# Patient Record
Sex: MALE | Race: WHITE | NOT HISPANIC OR LATINO | Employment: OTHER | URBAN - METROPOLITAN AREA
[De-identification: names, ages, dates, MRNs, and addresses within clinical notes are randomized per-mention and may not be internally consistent; named-entity substitution may affect disease eponyms.]

---

## 2017-10-18 ENCOUNTER — HISTORICAL (OUTPATIENT)
Dept: ADMINISTRATIVE | Facility: HOSPITAL | Age: 82
End: 2017-10-18

## 2017-11-16 ENCOUNTER — HISTORICAL (OUTPATIENT)
Dept: ADMINISTRATIVE | Facility: HOSPITAL | Age: 82
End: 2017-11-16

## 2017-11-16 LAB
ABS NEUT (OLG): 5.95 X10(3)/MCL (ref 2.1–9.2)
ALBUMIN SERPL-MCNC: 3.8 GM/DL (ref 3.4–5)
ALBUMIN/GLOB SERPL: 1 RATIO (ref 1.1–2)
ALP SERPL-CCNC: 92 UNIT/L (ref 50–136)
ALT SERPL-CCNC: 24 UNIT/L (ref 12–78)
AST SERPL-CCNC: 19 UNIT/L (ref 15–37)
BASOPHILS # BLD AUTO: 0.1 X10(3)/MCL (ref 0–0.2)
BASOPHILS NFR BLD AUTO: 0.7 %
BILIRUB SERPL-MCNC: 0.3 MG/DL (ref 0.2–1)
BILIRUBIN DIRECT+TOT PNL SERPL-MCNC: 0.1 MG/DL (ref 0–0.5)
BILIRUBIN DIRECT+TOT PNL SERPL-MCNC: 0.2 MG/DL (ref 0–0.8)
BUN SERPL-MCNC: 18 MG/DL (ref 7–18)
CALCIUM SERPL-MCNC: 9 MG/DL (ref 8.5–10.1)
CEA SERPL-MCNC: 2 NG/ML (ref 0–3)
CHLORIDE SERPL-SCNC: 104 MMOL/L (ref 98–107)
CO2 SERPL-SCNC: 22 MMOL/L (ref 21–32)
CREAT SERPL-MCNC: 0.94 MG/DL (ref 0.7–1.3)
EOSINOPHIL # BLD AUTO: 0.1 X10(3)/MCL (ref 0–0.9)
EOSINOPHIL NFR BLD AUTO: 1.5 %
ERYTHROCYTE [DISTWIDTH] IN BLOOD BY AUTOMATED COUNT: 15.2 % (ref 11.5–17)
FERRITIN SERPL-MCNC: 10.9 NG/ML (ref 8–388)
GLOBULIN SER-MCNC: 3.9 GM/DL (ref 2.4–3.5)
GLUCOSE SERPL-MCNC: 83 MG/DL (ref 74–106)
HCT VFR BLD AUTO: 35.3 % (ref 42–52)
HGB BLD-MCNC: 10.8 GM/DL (ref 14–18)
IRON SATN MFR SERPL: 4.5 % (ref 20–50)
IRON SERPL-MCNC: 24 MCG/DL (ref 50–175)
LYMPHOCYTES # BLD AUTO: 1.1 X10(3)/MCL (ref 0.6–4.6)
LYMPHOCYTES NFR BLD AUTO: 13.2 %
MCH RBC QN AUTO: 26.1 PG (ref 27–31)
MCHC RBC AUTO-ENTMCNC: 30.6 GM/DL (ref 33–36)
MCV RBC AUTO: 85.3 FL (ref 80–94)
MONOCYTES # BLD AUTO: 0.9 X10(3)/MCL (ref 0.1–1.3)
MONOCYTES NFR BLD AUTO: 10.9 %
NEUTROPHILS # BLD AUTO: 6 X10(3)/MCL (ref 2.1–9.2)
NEUTROPHILS NFR BLD AUTO: 73.7 %
PLATELET # BLD AUTO: 203 X10(3)/MCL (ref 130–400)
PMV BLD AUTO: 9.6 FL (ref 9.4–12.4)
POTASSIUM SERPL-SCNC: 4.1 MMOL/L (ref 3.5–5.1)
PROT SERPL-MCNC: 7.7 GM/DL (ref 6.4–8.2)
RBC # BLD AUTO: 4.14 X10(6)/MCL (ref 4.7–6.1)
SODIUM SERPL-SCNC: 140 MMOL/L (ref 136–145)
TIBC SERPL-MCNC: 528 MCG/DL (ref 250–450)
TRANSFERRIN SERPL-MCNC: 404 MG/DL (ref 200–360)
WBC # SPEC AUTO: 8.1 X10(3)/MCL (ref 4.5–11.5)

## 2017-11-20 ENCOUNTER — HISTORICAL (OUTPATIENT)
Dept: INFUSION THERAPY | Facility: HOSPITAL | Age: 82
End: 2017-11-20

## 2017-11-27 ENCOUNTER — HISTORICAL (OUTPATIENT)
Dept: INFUSION THERAPY | Facility: HOSPITAL | Age: 82
End: 2017-11-27

## 2017-12-14 ENCOUNTER — HISTORICAL (OUTPATIENT)
Dept: ADMINISTRATIVE | Facility: HOSPITAL | Age: 82
End: 2017-12-14

## 2017-12-14 LAB
ABS NEUT (OLG): 5.06 X10(3)/MCL (ref 2.1–9.2)
ALBUMIN SERPL-MCNC: 3.9 GM/DL (ref 3.4–5)
ALBUMIN/GLOB SERPL: 1.1 {RATIO}
ALP SERPL-CCNC: 120 UNIT/L (ref 50–136)
ALT SERPL-CCNC: 24 UNIT/L (ref 12–78)
AST SERPL-CCNC: 15 UNIT/L (ref 15–37)
BASOPHILS # BLD AUTO: 0 X10(3)/MCL (ref 0–0.2)
BASOPHILS NFR BLD AUTO: 0.4 %
BILIRUB SERPL-MCNC: 0.3 MG/DL (ref 0.2–1)
BILIRUBIN DIRECT+TOT PNL SERPL-MCNC: 0.1 MG/DL (ref 0–0.2)
BILIRUBIN DIRECT+TOT PNL SERPL-MCNC: 0.2 MG/DL (ref 0–0.8)
BUN SERPL-MCNC: 13 MG/DL (ref 7–18)
CALCIUM SERPL-MCNC: 8.2 MG/DL (ref 8.5–10.1)
CHLORIDE SERPL-SCNC: 109 MMOL/L (ref 98–107)
CO2 SERPL-SCNC: 23 MMOL/L (ref 21–32)
CREAT SERPL-MCNC: 0.79 MG/DL (ref 0.7–1.3)
EOSINOPHIL # BLD AUTO: 0.2 X10(3)/MCL (ref 0–0.9)
EOSINOPHIL NFR BLD AUTO: 2.7 %
ERYTHROCYTE [DISTWIDTH] IN BLOOD BY AUTOMATED COUNT: 21.6 % (ref 11.5–17)
GLOBULIN SER-MCNC: 3.6 GM/DL (ref 2.4–3.5)
GLUCOSE SERPL-MCNC: 88 MG/DL (ref 74–106)
HCT VFR BLD AUTO: 41.7 % (ref 42–52)
HGB BLD-MCNC: 13.3 GM/DL (ref 14–18)
LYMPHOCYTES # BLD AUTO: 1 X10(3)/MCL (ref 0.6–4.6)
LYMPHOCYTES NFR BLD AUTO: 14.2 %
MCH RBC QN AUTO: 27.7 PG (ref 27–31)
MCHC RBC AUTO-ENTMCNC: 31.9 GM/DL (ref 33–36)
MCV RBC AUTO: 86.7 FL (ref 80–94)
MONOCYTES # BLD AUTO: 0.7 X10(3)/MCL (ref 0.1–1.3)
MONOCYTES NFR BLD AUTO: 10 %
NEUTROPHILS # BLD AUTO: 5.1 X10(3)/MCL (ref 2.1–9.2)
NEUTROPHILS NFR BLD AUTO: 72.7 %
PLATELET # BLD AUTO: 154 X10(3)/MCL (ref 130–400)
PMV BLD AUTO: 9.3 FL (ref 9.4–12.4)
POTASSIUM SERPL-SCNC: 4.2 MMOL/L (ref 3.5–5.1)
PROT SERPL-MCNC: 7.5 GM/DL (ref 6.4–8.2)
RBC # BLD AUTO: 4.81 X10(6)/MCL (ref 4.7–6.1)
SODIUM SERPL-SCNC: 140 MMOL/L (ref 136–145)
WBC # SPEC AUTO: 7 X10(3)/MCL (ref 4.5–11.5)

## 2018-01-03 ENCOUNTER — HISTORICAL (OUTPATIENT)
Dept: ADMINISTRATIVE | Facility: HOSPITAL | Age: 83
End: 2018-01-03

## 2018-01-03 LAB
ABS NEUT (OLG): 5.68 X10(3)/MCL (ref 2.1–9.2)
ALBUMIN SERPL-MCNC: 3.9 GM/DL (ref 3.4–5)
ALBUMIN/GLOB SERPL: 1.1 RATIO (ref 1.1–2)
ALP SERPL-CCNC: 109 UNIT/L (ref 50–136)
ALT SERPL-CCNC: 26 UNIT/L (ref 12–78)
AST SERPL-CCNC: 17 UNIT/L (ref 15–37)
BASOPHILS # BLD AUTO: 0 X10(3)/MCL (ref 0–0.2)
BASOPHILS NFR BLD AUTO: 0.5 %
BILIRUB SERPL-MCNC: 0.3 MG/DL (ref 0.2–1)
BILIRUBIN DIRECT+TOT PNL SERPL-MCNC: 0.1 MG/DL (ref 0–0.5)
BILIRUBIN DIRECT+TOT PNL SERPL-MCNC: 0.2 MG/DL (ref 0–0.8)
BUN SERPL-MCNC: 19 MG/DL (ref 7–18)
CALCIUM SERPL-MCNC: 8.7 MG/DL (ref 8.5–10.1)
CHLORIDE SERPL-SCNC: 109 MMOL/L (ref 98–107)
CO2 SERPL-SCNC: 24 MMOL/L (ref 21–32)
CREAT SERPL-MCNC: 0.79 MG/DL (ref 0.7–1.3)
EOSINOPHIL # BLD AUTO: 0.2 X10(3)/MCL (ref 0–0.9)
EOSINOPHIL NFR BLD AUTO: 2.3 %
ERYTHROCYTE [DISTWIDTH] IN BLOOD BY AUTOMATED COUNT: 24.1 % (ref 11.5–17)
GLOBULIN SER-MCNC: 3.6 GM/DL (ref 2.4–3.5)
GLUCOSE SERPL-MCNC: 92 MG/DL (ref 74–106)
HCT VFR BLD AUTO: 42.9 % (ref 42–52)
HGB BLD-MCNC: 13.9 GM/DL (ref 14–18)
LYMPHOCYTES # BLD AUTO: 1 X10(3)/MCL (ref 0.6–4.6)
LYMPHOCYTES NFR BLD AUTO: 13.3 %
MCH RBC QN AUTO: 28.4 PG (ref 27–31)
MCHC RBC AUTO-ENTMCNC: 32.4 GM/DL (ref 33–36)
MCV RBC AUTO: 87.7 FL (ref 80–94)
MONOCYTES # BLD AUTO: 0.8 X10(3)/MCL (ref 0.1–1.3)
MONOCYTES NFR BLD AUTO: 10.6 %
NEUTROPHILS # BLD AUTO: 5.7 X10(3)/MCL (ref 2.1–9.2)
NEUTROPHILS NFR BLD AUTO: 73.3 %
PLATELET # BLD AUTO: 152 X10(3)/MCL (ref 130–400)
PMV BLD AUTO: 8.7 FL (ref 9.4–12.4)
POTASSIUM SERPL-SCNC: 4 MMOL/L (ref 3.5–5.1)
PROT SERPL-MCNC: 7.5 GM/DL (ref 6.4–8.2)
RBC # BLD AUTO: 4.89 X10(6)/MCL (ref 4.7–6.1)
SODIUM SERPL-SCNC: 140 MMOL/L (ref 136–145)
WBC # SPEC AUTO: 7.8 X10(3)/MCL (ref 4.5–11.5)

## 2018-01-08 ENCOUNTER — HISTORICAL (OUTPATIENT)
Dept: INFUSION THERAPY | Facility: HOSPITAL | Age: 83
End: 2018-01-08

## 2018-01-24 ENCOUNTER — HISTORICAL (OUTPATIENT)
Dept: ADMINISTRATIVE | Facility: HOSPITAL | Age: 83
End: 2018-01-24

## 2018-01-24 LAB
ABS NEUT (OLG): 3.54 X10(3)/MCL (ref 2.1–9.2)
ALBUMIN SERPL-MCNC: 4 GM/DL (ref 3.4–5)
ALBUMIN/GLOB SERPL: 1.1 RATIO (ref 1.1–2)
ALP SERPL-CCNC: 138 UNIT/L (ref 50–136)
ALT SERPL-CCNC: 27 UNIT/L (ref 12–78)
AST SERPL-CCNC: 14 UNIT/L (ref 15–37)
BASOPHILS # BLD AUTO: 0 X10(3)/MCL (ref 0–0.2)
BASOPHILS NFR BLD AUTO: 0.5 %
BILIRUB SERPL-MCNC: 0.4 MG/DL (ref 0.2–1)
BILIRUBIN DIRECT+TOT PNL SERPL-MCNC: 0.1 MG/DL (ref 0–0.5)
BILIRUBIN DIRECT+TOT PNL SERPL-MCNC: 0.3 MG/DL (ref 0–0.8)
BUN SERPL-MCNC: 17 MG/DL (ref 7–18)
CALCIUM SERPL-MCNC: 8.8 MG/DL (ref 8.5–10.1)
CHLORIDE SERPL-SCNC: 107 MMOL/L (ref 98–107)
CO2 SERPL-SCNC: 25 MMOL/L (ref 21–32)
CREAT SERPL-MCNC: 0.87 MG/DL (ref 0.7–1.3)
EOSINOPHIL # BLD AUTO: 0.2 X10(3)/MCL (ref 0–0.9)
EOSINOPHIL NFR BLD AUTO: 4.3 %
ERYTHROCYTE [DISTWIDTH] IN BLOOD BY AUTOMATED COUNT: 25.3 % (ref 11.5–17)
GLOBULIN SER-MCNC: 3.5 GM/DL (ref 2.4–3.5)
GLUCOSE SERPL-MCNC: 90 MG/DL (ref 74–106)
HCT VFR BLD AUTO: 45.2 % (ref 42–52)
HGB BLD-MCNC: 14.9 GM/DL (ref 14–18)
LYMPHOCYTES # BLD AUTO: 1.2 X10(3)/MCL (ref 0.6–4.6)
LYMPHOCYTES NFR BLD AUTO: 21.2 %
MCH RBC QN AUTO: 29.6 PG (ref 27–31)
MCHC RBC AUTO-ENTMCNC: 33 GM/DL (ref 33–36)
MCV RBC AUTO: 89.7 FL (ref 80–94)
MONOCYTES # BLD AUTO: 0.8 X10(3)/MCL (ref 0.1–1.3)
MONOCYTES NFR BLD AUTO: 12.9 %
NEUTROPHILS # BLD AUTO: 3.5 X10(3)/MCL (ref 2.1–9.2)
NEUTROPHILS NFR BLD AUTO: 61.1 %
PLATELET # BLD AUTO: 156 X10(3)/MCL (ref 130–400)
PMV BLD AUTO: 9 FL (ref 9.4–12.4)
POTASSIUM SERPL-SCNC: 4.5 MMOL/L (ref 3.5–5.1)
PROT SERPL-MCNC: 7.5 GM/DL (ref 6.4–8.2)
RBC # BLD AUTO: 5.04 X10(6)/MCL (ref 4.7–6.1)
SODIUM SERPL-SCNC: 138 MMOL/L (ref 136–145)
WBC # SPEC AUTO: 5.8 X10(3)/MCL (ref 4.5–11.5)

## 2018-03-05 ENCOUNTER — HISTORICAL (OUTPATIENT)
Dept: INFUSION THERAPY | Facility: HOSPITAL | Age: 83
End: 2018-03-05

## 2018-04-30 ENCOUNTER — HISTORICAL (OUTPATIENT)
Dept: INFUSION THERAPY | Facility: HOSPITAL | Age: 83
End: 2018-04-30

## 2018-05-30 ENCOUNTER — HISTORICAL (OUTPATIENT)
Dept: RADIOLOGY | Facility: HOSPITAL | Age: 83
End: 2018-05-30

## 2018-06-01 ENCOUNTER — HISTORICAL (OUTPATIENT)
Dept: HEMATOLOGY/ONCOLOGY | Facility: CLINIC | Age: 83
End: 2018-06-01

## 2018-06-25 ENCOUNTER — HISTORICAL (OUTPATIENT)
Dept: INFUSION THERAPY | Facility: HOSPITAL | Age: 83
End: 2018-06-25

## 2018-08-17 ENCOUNTER — HISTORICAL (OUTPATIENT)
Dept: INFUSION THERAPY | Facility: HOSPITAL | Age: 83
End: 2018-08-17

## 2018-09-27 ENCOUNTER — HISTORICAL (OUTPATIENT)
Dept: HEMATOLOGY/ONCOLOGY | Facility: CLINIC | Age: 83
End: 2018-09-27

## 2018-10-12 ENCOUNTER — HISTORICAL (OUTPATIENT)
Dept: INFUSION THERAPY | Facility: HOSPITAL | Age: 83
End: 2018-10-12

## 2018-12-07 ENCOUNTER — HISTORICAL (OUTPATIENT)
Dept: INFUSION THERAPY | Facility: HOSPITAL | Age: 83
End: 2018-12-07

## 2019-02-05 ENCOUNTER — HISTORICAL (OUTPATIENT)
Dept: INFUSION THERAPY | Facility: HOSPITAL | Age: 84
End: 2019-02-05

## 2019-06-17 ENCOUNTER — HISTORICAL (OUTPATIENT)
Dept: ADMINISTRATIVE | Facility: HOSPITAL | Age: 84
End: 2019-06-17

## 2019-06-17 LAB
EST. AVERAGE GLUCOSE BLD GHB EST-MCNC: 137 MG/DL
HBA1C MFR BLD: 6.4 % (ref 4.2–6.3)

## 2019-06-26 ENCOUNTER — HISTORICAL (OUTPATIENT)
Dept: ADMINISTRATIVE | Facility: HOSPITAL | Age: 84
End: 2019-06-26

## 2019-06-26 LAB
ALBUMIN SERPL-MCNC: 3.6 GM/DL (ref 3.4–5)
ALBUMIN/GLOB SERPL: 1 RATIO (ref 1.1–2)
ALP SERPL-CCNC: 107 UNIT/L (ref 50–136)
ALT SERPL-CCNC: 26 UNIT/L (ref 12–78)
AST SERPL-CCNC: 13 UNIT/L (ref 15–37)
BILIRUB SERPL-MCNC: 0.5 MG/DL (ref 0.2–1)
BILIRUBIN DIRECT+TOT PNL SERPL-MCNC: 0.1 MG/DL (ref 0–0.5)
BILIRUBIN DIRECT+TOT PNL SERPL-MCNC: 0.4 MG/DL (ref 0–0.8)
BUN SERPL-MCNC: 22 MG/DL (ref 7–18)
CALCIUM SERPL-MCNC: 9 MG/DL (ref 8.5–10.1)
CHLORIDE SERPL-SCNC: 110 MMOL/L (ref 98–107)
CHOLEST SERPL-MCNC: 202 MG/DL (ref 0–200)
CHOLEST/HDLC SERPL: 3.3 {RATIO} (ref 0–5)
CO2 SERPL-SCNC: 27 MMOL/L (ref 21–32)
CREAT SERPL-MCNC: 0.88 MG/DL (ref 0.7–1.3)
GLOBULIN SER-MCNC: 3.5 GM/DL (ref 2.4–3.5)
GLUCOSE SERPL-MCNC: 114 MG/DL (ref 74–106)
HDLC SERPL-MCNC: 61 MG/DL (ref 35–60)
LDLC SERPL CALC-MCNC: 122 MG/DL (ref 0–129)
POTASSIUM SERPL-SCNC: 4.2 MMOL/L (ref 3.5–5.1)
PROT SERPL-MCNC: 7.1 GM/DL (ref 6.4–8.2)
SODIUM SERPL-SCNC: 142 MMOL/L (ref 136–145)
TRIGL SERPL-MCNC: 93 MG/DL (ref 30–150)
TSH SERPL-ACNC: 0.88 MIU/L (ref 0.36–3.74)
VLDLC SERPL CALC-MCNC: 19 MG/DL

## 2019-07-25 ENCOUNTER — HISTORICAL (OUTPATIENT)
Dept: LAB | Facility: HOSPITAL | Age: 84
End: 2019-07-25

## 2019-08-06 ENCOUNTER — HISTORICAL (OUTPATIENT)
Dept: HEMATOLOGY/ONCOLOGY | Facility: CLINIC | Age: 84
End: 2019-08-06

## 2020-01-31 ENCOUNTER — HISTORICAL (OUTPATIENT)
Dept: HEMATOLOGY/ONCOLOGY | Facility: CLINIC | Age: 85
End: 2020-01-31

## 2020-06-04 ENCOUNTER — HISTORICAL (OUTPATIENT)
Dept: ADMINISTRATIVE | Facility: HOSPITAL | Age: 85
End: 2020-06-04

## 2020-07-31 ENCOUNTER — HISTORICAL (OUTPATIENT)
Dept: HEMATOLOGY/ONCOLOGY | Facility: CLINIC | Age: 85
End: 2020-07-31

## 2020-11-30 ENCOUNTER — HISTORICAL (OUTPATIENT)
Dept: ADMINISTRATIVE | Facility: HOSPITAL | Age: 85
End: 2020-11-30

## 2021-02-05 ENCOUNTER — HISTORICAL (OUTPATIENT)
Dept: HEMATOLOGY/ONCOLOGY | Facility: CLINIC | Age: 86
End: 2021-02-05

## 2021-05-26 ENCOUNTER — HISTORICAL (OUTPATIENT)
Dept: ADMINISTRATIVE | Facility: HOSPITAL | Age: 86
End: 2021-05-26

## 2021-08-06 ENCOUNTER — HISTORICAL (OUTPATIENT)
Dept: HEMATOLOGY/ONCOLOGY | Facility: CLINIC | Age: 86
End: 2021-08-06

## 2021-10-11 ENCOUNTER — HISTORICAL (OUTPATIENT)
Dept: ADMINISTRATIVE | Facility: HOSPITAL | Age: 86
End: 2021-10-11

## 2021-12-02 ENCOUNTER — HISTORICAL (OUTPATIENT)
Dept: ADMINISTRATIVE | Facility: HOSPITAL | Age: 86
End: 2021-12-02

## 2022-02-17 ENCOUNTER — HISTORICAL (OUTPATIENT)
Dept: HEMATOLOGY/ONCOLOGY | Facility: CLINIC | Age: 87
End: 2022-02-17

## 2022-02-17 LAB
ABS NEUT (OLG): 4.26 (ref 2.1–9.2)
ALBUMIN SERPL-MCNC: 3.9 G/DL (ref 3.4–4.8)
ALBUMIN/GLOB SERPL: 1.1 {RATIO} (ref 1.1–2)
ALP SERPL-CCNC: 104 U/L (ref 40–150)
ALT SERPL-CCNC: 15 U/L (ref 0–55)
AST SERPL-CCNC: 16 U/L (ref 5–34)
BASOPHILS # BLD AUTO: 0.1 10*3/UL (ref 0–0.2)
BASOPHILS NFR BLD AUTO: 0.9 %
BILIRUB SERPL-MCNC: 0.5 MG/DL
BILIRUBIN DIRECT+TOT PNL SERPL-MCNC: 0.2 (ref 0–0.5)
BILIRUBIN DIRECT+TOT PNL SERPL-MCNC: 0.3 (ref 0–0.8)
BUN SERPL-MCNC: 17.5 MG/DL (ref 8.4–25.7)
CALCIUM SERPL-MCNC: 9.1 MG/DL (ref 8.7–10.5)
CEA SERPL-MCNC: 2.15 NG/ML (ref 0–3)
CHLORIDE SERPL-SCNC: 108 MMOL/L (ref 98–107)
CO2 SERPL-SCNC: 27 MMOL/L (ref 23–31)
CREAT SERPL-MCNC: 0.86 MG/DL (ref 0.73–1.18)
EOSINOPHIL # BLD AUTO: 0.2 10*3/UL (ref 0–0.9)
EOSINOPHIL NFR BLD AUTO: 3.3 %
ERYTHROCYTE [DISTWIDTH] IN BLOOD BY AUTOMATED COUNT: 12.8 % (ref 11.5–17)
GLOBULIN SER-MCNC: 3.4 G/DL (ref 2.4–3.5)
GLUCOSE SERPL-MCNC: 133 MG/DL (ref 82–115)
HCT VFR BLD AUTO: 45.4 % (ref 42–52)
HEMOLYSIS INTERF INDEX SERPL-ACNC: 14
HGB BLD-MCNC: 14.9 G/DL (ref 14–18)
ICTERIC INTERF INDEX SERPL-ACNC: 1
LIPEMIC INTERF INDEX SERPL-ACNC: 4
LYMPHOCYTES # BLD AUTO: 1.4 10*3/UL (ref 0.6–4.6)
LYMPHOCYTES NFR BLD AUTO: 20.5 %
MANUAL DIFF? (OHS): NO
MCH RBC QN AUTO: 31 PG (ref 27–31)
MCHC RBC AUTO-ENTMCNC: 32.8 G/DL (ref 33–36)
MCV RBC AUTO: 94.4 FL (ref 80–94)
MONOCYTES # BLD AUTO: 0.7 10*3/UL (ref 0.1–1.3)
MONOCYTES NFR BLD AUTO: 10.9 %
NEUTROPHILS # BLD AUTO: 4.3 10*3/UL (ref 2.1–9.2)
NEUTROPHILS NFR BLD AUTO: 64.2 %
PLATELET # BLD AUTO: 186 10*3/UL (ref 130–400)
PMV BLD AUTO: 9.2 FL (ref 9.4–12.4)
POTASSIUM SERPL-SCNC: 4.8 MMOL/L (ref 3.5–5.1)
PROT SERPL-MCNC: 7.3 G/DL (ref 5.8–7.6)
RBC # BLD AUTO: 4.81 10*6/UL (ref 4.7–6.1)
SODIUM SERPL-SCNC: 141 MMOL/L (ref 136–145)
WBC # SPEC AUTO: 6.6 10*3/UL (ref 4.5–11.5)

## 2022-04-07 ENCOUNTER — HISTORICAL (OUTPATIENT)
Dept: ADMINISTRATIVE | Facility: HOSPITAL | Age: 87
End: 2022-04-07
Payer: MEDICARE

## 2022-04-23 VITALS
DIASTOLIC BLOOD PRESSURE: 69 MMHG | SYSTOLIC BLOOD PRESSURE: 142 MMHG | OXYGEN SATURATION: 98 % | BODY MASS INDEX: 25.2 KG/M2 | WEIGHT: 166.25 LBS | HEIGHT: 68 IN

## 2022-04-28 NOTE — PROGRESS NOTES
Patient:   Blaine Elmore            MRN: [id number]            FIN: [id number]               Age:   84 years     Sex:  Male     :  3/12/1934   Associated Diagnoses:   None   Author:   Dario SMILEY, Xin GUILLORY      Visit Information   Diagnosis: Clinical stage III colon cancer (T2 N1b M0)                    Iron deficiency anemia    Current Treatment Adjuvant Xeloda s/p 4 cycles (started 17)  Treatment History: Injectafer     Clinical History:  Patient initially presented with painless rectal bleeding. He was on chronic Plavix. Colonoscopy 17 showed a malignant-appearing mass in the cecum. Biopsy was positive for moderately differentiated adenocarcinoma. Preoperative CEA level was 3.5 ng/mL. CT scans of the chest, abdomen and pelvis 17 showed severe hepatic steatosis and hypodense lesions compatible with cysts. There was a 1.9 cm lytic appearing lesion in the right ischium and 3-4 mm bilateral pulmonary nodules. Bone scan 17 showed uptake in the proximal left tibia but no abnormal activity in the pelvis. Plain films showed no significant abnormalities. He has no symptoms of bone pain or difficulty ambulating. He underwent a right hemicolectomy 17. Final pathology revealed a 3.7 cm moderately differentiated adenocarcinoma extending into but not through the muscularis propria. Resection margins were clear. 2 out of 24 pericolonic lymph nodes were positive. He presented to [facility] for a treatment opinion. He was staged as IIIA. Recommendations were for adjuvant treatment with CAPOX for 3 months versus oral Xeloda for 6 months. MRI of the left knee was recommended to follow up his abnormal bone scan. He declined an MRI given his lack of symptoms. He underwent placement of a Mediport catheter in preparation for chemotherapy. He remained weak postoperatively, and decided he did not want to consider infusional chemotherapy.    [facility]  "establish local Oncology care.  Treatment options were reviewed and discussed again.  Patient opted for therapy with oral Xeloda for 6 months as tolerated. Additional laboratory showed evidence of iron deficiency anemia. He was treated with IV Injectafer x 2 doses 11/20 & 11/27/17 with complete recovery of his blood counts.  He initiated the Xeloda 11/27/17.      Chief Complaint   "Anxious for scan results"      Interval History   Patient is here by himself for followup.  He completed 6 months of therapy with oral Xeloda 5/28/18.  He had moderate fatigue and diarrhea, but no hand and foot syndrome or mucositis.  He was referred for surveillance CT scans of the chest, abdomen, and pelvis 5/30/18.  These scans were reviewed in the office today with the patient.  There were hypoattenuating liver lesions bilaterally, with the lesion on the right measuring 14 mm.  These lesions were noted on his previous CT at an outside facility and felt to represent cysts.  There were a few small nonspecific pulmonary nodules, none measuring more than 4 mm.  These were stable in comparison to his prior outside film.  Patient is feeling better off of the Xeloda.  He has no abdominal symptoms or complaints.  CBC is normal.  CMP, CEA and iron studies are normal.      Review of Systems   Constitutional:  Fatigue, No fever, No chills, No sweats, No weakness.    Eye:  No icterus, No blurring, No double vision.    Ear/Nose/Mouth/Throat:  No nasal congestion, No sore throat.    Respiratory:  No shortness of breath, No cough, No sputum production, No hemoptysis.    Cardiovascular:  No chest pain, No palpitations, No tachycardia.    Gastrointestinal:  No melena or hematochezia, Intermittent loose stools, No nausea, No vomiting, No constipation, No abdominal pain.    Genitourinary:  No dysuria, No hematuria, No change in urine stream.    Hematology/Lymphatics:  Bruising tendency, No bleeding tendency, No swollen lymph glands.    Musculoskeletal:  " No lower extremity edema, No back pain, No joint pain.    Integumentary:  No redness or tenderness, No rash, No pruritus, No skin lesion.    Neurologic:  Alert and oriented X4, No abnormal balance, No confusion, No numbness, No tingling.    Psychiatric:  Depression (Bipolar disorder), No anxiety, Not suicidal, Not delusional.       Health Status   Allergies:    Allergic Reactions (Selected)  Severity Not Documented  VESIcare- Arrhythmia.,    Allergies (1) Active Reaction  VESIcare arrhythmia     Current medications:  (Selected)   Outpatient Medications  Future  heparin flush 100 units/mL (500 Unit  Flush): 500 units, form: Injection, IV Push, Once-chemo, first dose 06/25/18 14:00:00 CDT, stop date 06/25/18 14:00:00 CDT, Heparin Flush for Medi-port, Weeks 25, Future Order  Prescriptions  Prescribed  Bromday 0.09% ophthalmic solution: 1 drop(s), Eye-Right, Daily, # 1 mL, 0 Refill(s), other reason (Rx)  Zymaxid 0.5% ophthalmic solution: 1 drop(s), Eye-Right, QID, # 2 mL, 0 Refill(s), other reason (Rx)  prednisoLONE acetate ophthalmic 1% suspension: 1 drop(s), Eye-Right, QID, # 5 mL, 0 Refill(s), other reason (Rx)  Documented Medications  Documented  Cymbalta 30 mg oral delayed release capsule: 30 mg = 1 cap(s), Oral, BID, take as needed, 0 Refill(s)  Flomax 0.4 mg oral capsule: 0.4 mg = 1 cap(s), Oral, Daily, # 30 cap(s), 0 Refill(s)  amlodipine 10 mg oral tablet: 10 mg = 1 tab(s), Oral, Daily, # 30 tab(s), 0 Refill(s)  aspirin 81 mg oral tablet: 81 mg = 1 tab(s), Daily, 0 Refill(s)  candesartan 16 mg oral tablet: 16 mg = 1 tab(s), Oral, Daily, 0 Refill(s)  metformin 500 mg oral tablet: 500 mg = 1 tab(s), Oral, Daily, # 60 tab(s), 0 Refill(s)  multivitamin with minerals (Adult Tab): 1 tab(s), Oral, Daily, # 30 tab(s), 0 Refill(s)      Histories     PMHx:  CAD, HTN, hypercholesterolemia, arthritis, diabetes mellitus with neuropathy, bipolar disorder, BPH, cataracts, melanoma right back 2005, anemia    PSHx:  Right  hemicolectomy 9/17, colonoscopy, tonsillectomy, melanoma resection right lower back, Mediport    SH:  No cigarettes, + smokeless tobacco, quit 2014.    FH:  His father had multiple myeloma.      Physical Examination   Vital Signs   6/1/2018 9:00 CDT        Temperature Oral          36.7 DegC                             Temperature Oral (calculated)             98.06 DegF                             Peripheral Pulse Rate     69 bpm                             Systolic Blood Pressure   149 mmHg  HI                             Diastolic Blood Pressure  86 mmHg     Measurements from flowsheet : Measurements   6/1/2018 9:00 CDT        Weight Dosing             80 kg                             Weight Measured           80 kg                             Weight Measured and Calculated in Lbs     176.37 lb                             Height/Length Dosing      172.5 cm  (Modified)                            Height/Length Measured    172.5 cm  (Modified)                            BSA Measured              1.96 m2  (Modified)                            Body Mass Index Measured  26.89 kg/m2  (Modified)    General:  Alert and oriented, Elderly, well-developed white male in no acute distress, Pleasant.  Ambulatory without assistance.    Eye:  Pupils are equal, round and reactive to light, Extraocular movements are intact, Normal conjunctiva, Sclera nonicteric.    HENT:  Normocephalic, Oral mucosa is moist, No pharyngeal erythema, No oral lesions or mucositis.    Neck:  Supple, Non-tender, No lymphadenopathy.    Respiratory:  Lungs are clear to auscultation, Respirations are non-labored, Breath sounds are equal.    Cardiovascular:  Normal rate, Regular rhythm, No murmur, Mediport catheter right chest wall, nontender.    Gastrointestinal:  Soft, Non-tender, Non-distended, Normal bowel sounds, Well-healed laparoscopic incision sites. No palpable masses or organomegaly.    Lymphatics:  No lymphadenopathy neck, axilla, groin.     Musculoskeletal:  Normal range of motion, No tenderness, No lower extremity edema.    Integumentary:  Warm, Dry, Macules on forearms bilaterally, No hand/foot syndrome.    Neurologic:  Alert, Oriented, Normal sensory, Normal motor function, No focal deficits, Cranial Nerves II-XII are grossly intact.    ECOG Performance Scale: 1- Strenuous physical activity restricted; fully ambulatory and able to carry out light work.      Review / Management   Results review:  Lab results   6/1/2018 8:42 CDT        WBC                       6.2 x10(3)/mcL                             RBC                       4.53 x10(6)/mcL  LOW                             Hgb                       15.5 gm/dL                             Hct                       45.4 %                             Platelet                  152 x10(3)/mcL                             MCV                       100.2 fL  HI                             MCH                       34.2 pg  HI                             MCHC                      34.1 gm/dL                             RDW                       13.4 %                             MPV                       9.3 fL  LOW                             Abs Neut                  4.03 x10(3)/mcL                             NEUT%                     64.8 %  NA                             NEUT#                     4.0 x10(3)/mcL                             LYMPH%                    18.3 %  NA                             LYMPH#                    1.1 x10(3)/mcL                             MONO%                     13.7 %  NA                             MONO#                     0.8 x10(3)/mcL                             EOS%                      2.7 %  NA                             EOS#                      0.2 x10(3)/mcL                             BASO%                     0.5 %  NA                             BASO#                     0.0 x10(3)/mcL    6/1/2018 8:40 CDT        Sodium Lvl                143 mmol/L                              Potassium Lvl             4.7 mmol/L                             Chloride                  108 mmol/L  HI                             CO2                       25.0 mmol/L                             Calcium Lvl               8.8 mg/dL                             Glucose Lvl               86 mg/dL                             BUN                       17.0 mg/dL                             Creatinine                1.06 mg/dL                             eGFR-AA                   >60 mL/min/1.73 m2  NA                             eGFR-ZEESHAN                  >60 mL/min/1.73 m2  NA                             Bili Total                0.4 mg/dL                             Bili Direct               0.10 mg/dL                             Bili Indirect             0.30 mg/dL                             AST                       20 unit/L                             ALT                       31 unit/L                             Alk Phos                  110 unit/L                             Total Protein             7.6 gm/dL                             Albumin Lvl               3.80 gm/dL                             Globulin                  3.80 gm/dL  HI                             A/G Ratio                 1.0  NA                             CEA                       2.4 ng/mL                             Iron Lvl                  126 mcg/dL                             Transferrin               290.0 mg/dL                             TIBC                      363 mcg/dL                             Iron Sat                  34.7 %                             Ferritin Lvl              299.7 ng/mL  .       Impression and Plan   IMPRESSION:  Stage III colon cancer  Iron deficiency anemia -resolved    PLAN:  Patient has completed 6 months of adjuvant treatment with oral Xeloda.  Laboratory shows no evidence of recurrent iron deficiency.  Surveillance CT imaging shows no evidence of metastatic  disease.  The pulmonary and hepatic findings were stable in comparison to his outside studies.  Refer for followup colonoscopy.  RTC 4 months for ongoing surveillance with repeat CT scans of the chest, abdomen, and pelvis.    ROMY CLANCY, FNP-C    Other Physicians  Dr. Crispin Jorgensen ([facility])

## 2022-04-28 NOTE — OP NOTE
DATE OF SURGERY:    10/18/2017    SURGEON:  rCispin Bradley IV, MD    PREOPERATIVE DIAGNOSES:    1. Cecal cancer.  2. Depression.  3. Diabetes mellitus.  4. Hypertension.  5. Blood pressure.    PREOPERATIVE DIAGNOSES:    1. Cecal cancer.  2. Depression.  3. Diabetes mellitus.  4. Hypertension.  5. Blood pressure.    PROCEDURES:    1. Left subclavian central venous catheter, attempted and abandoned.  2. Left internal jugular catheter ultrasound guided, attempted and abandoned.  3. Right subclavian central venous catheter, attempted and abandoned.  4. Right internal jugular central venous catheter placed with fluoroscopic control and ultrasound guidance.    ANESTHESIA:  IV sedation with local infiltration.    ASSISTANT:  Karla Collins MD, Resident    ESTIMATED BLOOD LOSS:  Approximately 15 to 20 mL.    SPECIMENS:  None.    IMPLANT:  Bard PowerPort Slim, reference #[id number], lot #[id number].    PROCEDURE IN DETAIL:  After proper informed consent was obtained, the patient was transported to the operating room where he was placed supine on the operating table.  After adequate level of IV sedation was achieved, the patient's chest and neck were prepped and draped in the usual sterile fashion.  Operation commenced with infiltration of local anesthetic in left subclavicular fossa followed by attempted venipuncture to left subclavian vein.  We had significant difficulty accessing the vein, even despite Trendelenburg position, the vein seemed to collapse under any negative pressure.  We were able to finally guide the needle into the lumen.  However, we had significant difficulty getting the wire to thread.  Subsequently, we backed out from this approach and ultrasound interrogation of the left neck revealed the left internal jugular vein, which was demarcated on the skin with a surgical marker.  Local anesthetic was infiltrated over this area.  Again venipuncture was made, but the wire would not readily pass.  In even  relatively steep Trendelenburg position, there was significant venous collapse on inspiration.  Subsequently, we turned to the right subclavian approach.  Local anesthetic was infiltrated into the right subclavicular fossa.  Despite the patient being in relatively steep Trendelenburg position, we were not able to access the vein.  Subsequently, the right internal jugular approach was used. The vein was demarcated on the skin.  Head was turned slightly to the left and with slight compression on the neck, just behind the head of the clavicle, I was able to distend the vein and achieve a venipuncture through which a Glidewire was able to be placed. The Glidewire was affirmed to be in good position by fluoroscopy.  Counter incision was made upon the chest wall and a pocket created for the MediPort.  The MediPort was placed within the pocket and tunneled to the venipuncture site, fluoroscopically measured and trimmed to the appropriate length.  Sheath obturator was placed over the guide wire.  The guide wire and obturator were removed.  The catheter was placed down the sheath, which was peeled away, leaving it in good position.  The wounds were irrigated with warm normal saline and closed with a combination of 3-0 Vicryl, 4-0 Monocryl subcuticular closure.  Sterile dressings were applied.  The patient was awakened from his anesthesia and transported to the recovery room in good and stable condition.  All sponge, needle and instrument counts were correct at the end of the case.  Postoperative chest x-ray revealed excellent position with no complications. The patient did have a small fullness at the base of the left neck, which resolved with him being in upright position.        ______________________________  MD NIRAV Cohn IV/SONDRA  DD:  10/18/2017  Time:  02:00PM  DT:  10/19/2017  Time:  09:39AM  Job #:  [id number]    cc: MD Antonio Cummins MD John J. Mickey, MD Michael S. Cain, MD

## 2022-06-15 DIAGNOSIS — Z13.6 SCREENING FOR CARDIOVASCULAR CONDITION: ICD-10-CM

## 2022-06-15 DIAGNOSIS — R73.01 IFG (IMPAIRED FASTING GLUCOSE): ICD-10-CM

## 2022-06-15 DIAGNOSIS — Z12.5 SCREENING FOR PROSTATE CANCER: ICD-10-CM

## 2022-06-15 DIAGNOSIS — R53.83 FATIGUE, UNSPECIFIED TYPE: ICD-10-CM

## 2022-06-15 DIAGNOSIS — Z00.00 WELLNESS EXAMINATION: Primary | ICD-10-CM

## 2022-06-15 DIAGNOSIS — D64.9 ANEMIA, UNSPECIFIED TYPE: ICD-10-CM

## 2022-06-21 PROBLEM — I10 HYPERTENSION: Status: ACTIVE | Noted: 2022-06-21

## 2022-06-21 PROBLEM — F31.32 BIPOLAR AFFECTIVE DISORDER, CURRENTLY DEPRESSED, MODERATE: Status: ACTIVE | Noted: 2020-05-26

## 2022-06-21 PROBLEM — R73.03 PREDIABETES: Status: ACTIVE | Noted: 2022-06-21

## 2022-06-21 PROBLEM — M19.90 ARTHRITIS: Status: ACTIVE | Noted: 2022-06-21

## 2022-06-21 PROBLEM — C18.9 MALIGNANT NEOPLASM OF COLON: Status: ACTIVE | Noted: 2022-06-21

## 2022-06-21 PROBLEM — H91.90 HEARING LOSS: Status: ACTIVE | Noted: 2022-06-21

## 2022-06-21 PROBLEM — H26.9 CATARACT OF BOTH EYES: Status: ACTIVE | Noted: 2022-06-21

## 2022-06-21 RX ORDER — LOSARTAN POTASSIUM 100 MG/1
100 TABLET ORAL DAILY
COMMUNITY
Start: 2022-04-07 | End: 2023-01-30 | Stop reason: SDUPTHER

## 2022-06-21 RX ORDER — AMLODIPINE BESYLATE 10 MG/1
10 TABLET ORAL DAILY
COMMUNITY
Start: 2022-05-01 | End: 2023-01-30 | Stop reason: SDUPTHER

## 2022-06-21 NOTE — ASSESSMENT & PLAN NOTE
Blood pressures appear to be adequately controlled with current treatment plan, continue medical management and continue home blood pressure checks.  Blood pressure should be checked as discussed during medical visits, and average blood pressure should be no greater than 130/80.

## 2022-06-21 NOTE — PROGRESS NOTES
"Subjective:       Patient ID: Blaine Elmore is a 88 y.o. male.    Chief Complaint: Annual Exam    HPI   Patient presents to the clinic today for wellness examination.  Current and past medical history reviewed.  Pertinent family and social history reviewed.    Colorectal cancer screening:  CRC screening reviewed  Prostate CA screening:  Prostate CA screening reviewed  Vaccinations due:  All vaccinations due and/or desired have been addressed and given      No complaints today.    Review of Systems   Constitutional: Negative.  Negative for fatigue and fever.   HENT: Negative.  Negative for sore throat and trouble swallowing.    Eyes: Negative.  Negative for redness and visual disturbance.   Respiratory: Negative.  Negative for chest tightness and shortness of breath.    Cardiovascular: Negative.  Negative for chest pain.   Gastrointestinal: Negative.  Negative for abdominal pain, blood in stool and nausea.   Endocrine: Negative.  Negative for cold intolerance, heat intolerance and polyuria.   Genitourinary: Negative.    Musculoskeletal: Negative.  Negative for arthralgias, gait problem and joint swelling.   Integumentary:  Negative for rash. Negative.   Neurological: Negative.  Negative for dizziness, weakness and headaches.   Psychiatric/Behavioral: Negative.  Negative for agitation and dysphoric mood.         Objective:     Vitals:    06/22/22 0942   BP: 129/66   BP Location: Left arm   Patient Position: Sitting   BP Method: Large (Automatic)   Pulse: 78   Resp: 18   Temp: 97.9 °F (36.6 °C)   TempSrc: Tympanic   SpO2: 97%   Weight: 75.3 kg (166 lb)   Height: 5' 10.08" (1.78 m)   Body mass index is 23.76 kg/m².     Physical Exam  Constitutional:       Appearance: Normal appearance.   HENT:      Head: Normocephalic.      Nose: Nose normal.      Mouth/Throat:      Pharynx: Oropharynx is clear.   Eyes:      Conjunctiva/sclera: Conjunctivae normal.   Cardiovascular:      Rate and Rhythm: Normal rate and regular " rhythm.   Pulmonary:      Effort: Pulmonary effort is normal.      Breath sounds: Normal breath sounds.   Abdominal:      General: Abdomen is flat. Bowel sounds are normal.      Palpations: Abdomen is soft.   Musculoskeletal:         General: Normal range of motion.      Cervical back: Neck supple.   Skin:     General: Skin is warm and dry.   Neurological:      General: No focal deficit present.      Mental Status: He is oriented to person, place, and time.   Psychiatric:         Mood and Affect: Mood normal.         Behavior: Behavior normal.         Lab testing was performed before the visit, results reviewed, all significant abnormalities discussed with the patient during today's visit.  Lab Results   Component Value Date     06/20/2022    K 4.5 06/20/2022    CO2 24 06/20/2022    BUN 22.0 06/20/2022    CREATININE 1.01 06/20/2022    AST 17 06/20/2022    ALT 16 06/20/2022    CHOL 229 (H) 06/20/2022    .00 (H) 06/20/2022    TRIG 92 06/20/2022    HDL 66 (H) 06/20/2022    TSH 0.4222 06/20/2022    HGBA1C 5.5 06/20/2022    PSA 7.93 (H) 06/20/2022    WBC 5.2 06/20/2022    RBC 4.91 06/20/2022    HGB 15.3 06/20/2022    HCT 46.3 06/20/2022    MCV 94.3 (H) 06/20/2022     06/20/2022     Assessment:     Problem List Items Addressed This Visit        Psychiatric    Bipolar affective disorder, currently depressed, moderate    Current Assessment & Plan     Medical condition is currently stable, continue current treatment plan.              Cardiac/Vascular    Primary hypertension    Current Assessment & Plan     Blood pressures appear to be adequately controlled with current treatment plan, continue medical management and continue home blood pressure checks.  Blood pressure should be checked as discussed during medical visits, and average blood pressure should be no greater than 130/80.           Mixed hyperlipidemia    Current Assessment & Plan     Consume a diet low in saturated fats, (fats that are solid at  room temperature such as animal fat) and trans fats, using healthy fats if necessary, olive oil and canola oil are 2 examples.  Increasing daily fiber intake can be very important in controlling cholesterol elevation as well.  Weight loss, especially weight loss associated with exercise can significantly lower lipid levels.  During future visits, depending on response to dietary changes, medication may be considered if lipid levels continue to be significantly elevated.              Renal/    Urinary frequency    Current Assessment & Plan     Patient is having some urinary frequency, more than likely has BPH, PSA elevated, has seen Urology in the past, will start Flomax daily.  Also, he is consuming between 2 and 6 beers daily and drinking a significant amount of water, also probably why he is having some urinary frequency, he will cut down on his beer intake daily.           Relevant Medications    tamsulosin (FLOMAX) 0.4 mg Cap    Elevated PSA    Current Assessment & Plan     We spoke about his PSA elevation, it was decided that we would defer any further evaluation for now, especially due to his age.  He will speak to his oncologist, Dr. Gurvinder Gerber about this, he still sees his oncologist on a regular basis for his colon cancer follow-up.  However, my recommendation is that due to his advanced age, a prostate cancer workup would not be prudent.              Endocrine    Prediabetes    Current Assessment & Plan     Current A1c looks excellent, 5.5%, will continue to monitor, continue current treatment plan.              Orthopedic    Arthritis      Other Visit Diagnoses     Wellness examination    -  Primary    Overall health status was reviewed.  Good health habits reinforced.  Annual wellness exams recommended.    Screening for prostate cancer        Patient sees Urology, continue follow-up, PSA today does look to be elevated, 7.93.    Screening for thyroid disorder        Screening for thyroid disease  reviewed, currently there are no significant signs of active thyroid disease.    Screening for deficiency anemia        Current complete blood count reviewed, there are no current signs of anemia of any kind.    Need for hepatitis C screening test        Screening for hepatitis C will be performed at next wellness exam.             Medication List with Changes/Refills   New Medications    TAMSULOSIN (FLOMAX) 0.4 MG CAP    Take 1 capsule (0.4 mg total) by mouth once daily.   Current Medications    AMLODIPINE (NORVASC) 10 MG TABLET    Take 10 mg by mouth once daily.    ASPIRIN (ECOTRIN) 81 MG EC TABLET    Take 81 mg by mouth.    LOSARTAN (COZAAR) 100 MG TABLET    Take 100 mg by mouth once daily.    MELATONIN (MELATIN) 3 MG TABLET    Take 3 mg by mouth.    VIT A/VIT C/VIT E/ZINC/COPPER (ICAPS AREDS ORAL)    Take by mouth.     Plan:           All acute and chronic conditions addressed and discussed.  Appropriate medical follow-up scheduled, including pertinent lab testing if necessary.  All questions answered, patient voiced understanding of all medical problems.  A scheduled his follow-up appointment for 6 weeks so we can discuss his urinary frequency and any other issues that need to be addressed.  Follow up in about 6 weeks (around 8/3/2022) for Chronic condition F/up.

## 2022-06-22 ENCOUNTER — OFFICE VISIT (OUTPATIENT)
Dept: PRIMARY CARE CLINIC | Facility: CLINIC | Age: 87
End: 2022-06-22
Payer: MEDICARE

## 2022-06-22 ENCOUNTER — DOCUMENTATION ONLY (OUTPATIENT)
Dept: PRIMARY CARE CLINIC | Facility: CLINIC | Age: 87
End: 2022-06-22

## 2022-06-22 VITALS
BODY MASS INDEX: 23.77 KG/M2 | WEIGHT: 166 LBS | HEART RATE: 78 BPM | HEIGHT: 70 IN | TEMPERATURE: 98 F | RESPIRATION RATE: 18 BRPM | DIASTOLIC BLOOD PRESSURE: 66 MMHG | OXYGEN SATURATION: 97 % | SYSTOLIC BLOOD PRESSURE: 129 MMHG

## 2022-06-22 DIAGNOSIS — Z11.59 NEED FOR HEPATITIS C SCREENING TEST: ICD-10-CM

## 2022-06-22 DIAGNOSIS — R73.03 PREDIABETES: ICD-10-CM

## 2022-06-22 DIAGNOSIS — F31.32 BIPOLAR AFFECTIVE DISORDER, CURRENTLY DEPRESSED, MODERATE: ICD-10-CM

## 2022-06-22 DIAGNOSIS — Z12.5 SCREENING FOR PROSTATE CANCER: ICD-10-CM

## 2022-06-22 DIAGNOSIS — Z00.00 WELLNESS EXAMINATION: Primary | ICD-10-CM

## 2022-06-22 DIAGNOSIS — I10 PRIMARY HYPERTENSION: ICD-10-CM

## 2022-06-22 DIAGNOSIS — R97.20 ELEVATED PSA: ICD-10-CM

## 2022-06-22 DIAGNOSIS — Z13.29 SCREENING FOR THYROID DISORDER: ICD-10-CM

## 2022-06-22 DIAGNOSIS — E78.2 MIXED HYPERLIPIDEMIA: ICD-10-CM

## 2022-06-22 DIAGNOSIS — M19.90 ARTHRITIS: ICD-10-CM

## 2022-06-22 DIAGNOSIS — Z13.0 SCREENING FOR DEFICIENCY ANEMIA: ICD-10-CM

## 2022-06-22 DIAGNOSIS — R35.0 URINARY FREQUENCY: ICD-10-CM

## 2022-06-22 PROBLEM — Z85.46 HISTORY OF PROSTATE CANCER: Status: RESOLVED | Noted: 2022-06-22 | Resolved: 2022-06-22

## 2022-06-22 PROBLEM — Z85.46 HISTORY OF PROSTATE CANCER: Status: ACTIVE | Noted: 2022-06-22

## 2022-06-22 PROCEDURE — 99397 PER PM REEVAL EST PAT 65+ YR: CPT | Mod: GY,,, | Performed by: GENERAL PRACTICE

## 2022-06-22 PROCEDURE — 99397 PR PREVENTIVE VISIT,EST,65 & OVER: ICD-10-PCS | Mod: GY,,, | Performed by: GENERAL PRACTICE

## 2022-06-22 RX ORDER — ASPIRIN 81 MG/1
81 TABLET ORAL
COMMUNITY

## 2022-06-22 RX ORDER — TALC
3 POWDER (GRAM) TOPICAL
COMMUNITY
End: 2024-02-07

## 2022-06-22 RX ORDER — TAMSULOSIN HYDROCHLORIDE 0.4 MG/1
0.4 CAPSULE ORAL DAILY
Qty: 30 CAPSULE | Refills: 11 | Status: SHIPPED | OUTPATIENT
Start: 2022-06-22 | End: 2022-08-03

## 2022-06-22 NOTE — ASSESSMENT & PLAN NOTE
We spoke about his PSA elevation, it was decided that we would defer any further evaluation for now, especially due to his age.  He will speak to his oncologist, Dr. Gurvinder Gerber about this, he still sees his oncologist on a regular basis for his colon cancer follow-up.  However, my recommendation is that due to his advanced age, a prostate cancer workup would not be prudent.

## 2022-06-22 NOTE — ASSESSMENT & PLAN NOTE
Patient is having some urinary frequency, more than likely has BPH, PSA elevated, has seen Urology in the past, will start Flomax daily.  Also, he is consuming between 2 and 6 beers daily and drinking a significant amount of water, also probably why he is having some urinary frequency, he will cut down on his beer intake daily.

## 2022-06-23 ENCOUNTER — TELEPHONE (OUTPATIENT)
Dept: HEMATOLOGY/ONCOLOGY | Facility: CLINIC | Age: 87
End: 2022-06-23
Payer: MEDICARE

## 2022-08-02 PROBLEM — H26.9 CATARACT OF BOTH EYES: Chronic | Status: ACTIVE | Noted: 2022-06-21

## 2022-08-02 PROBLEM — R35.0 URINARY FREQUENCY: Chronic | Status: ACTIVE | Noted: 2022-06-22

## 2022-08-02 PROBLEM — F31.32 BIPOLAR AFFECTIVE DISORDER, CURRENTLY DEPRESSED, MODERATE: Chronic | Status: ACTIVE | Noted: 2020-05-26

## 2022-08-02 PROBLEM — R73.03 PREDIABETES: Chronic | Status: ACTIVE | Noted: 2022-06-21

## 2022-08-02 PROBLEM — C18.9 MALIGNANT NEOPLASM OF COLON: Chronic | Status: ACTIVE | Noted: 2022-06-21

## 2022-08-02 PROBLEM — E78.5 DYSLIPIDEMIA: Chronic | Status: ACTIVE | Noted: 2022-08-02

## 2022-08-02 PROBLEM — R97.20 ELEVATED PSA: Chronic | Status: ACTIVE | Noted: 2022-06-22

## 2022-08-02 PROBLEM — M19.90 ARTHRITIS: Chronic | Status: ACTIVE | Noted: 2022-06-21

## 2022-08-02 PROBLEM — E78.2 MIXED HYPERLIPIDEMIA: Status: RESOLVED | Noted: 2022-06-22 | Resolved: 2022-08-02

## 2022-08-02 PROBLEM — I10 PRIMARY HYPERTENSION: Chronic | Status: ACTIVE | Noted: 2022-06-21

## 2022-08-02 PROBLEM — H91.90 HEARING LOSS: Chronic | Status: ACTIVE | Noted: 2022-06-21

## 2022-08-02 PROBLEM — E78.5 DYSLIPIDEMIA: Status: ACTIVE | Noted: 2022-08-02

## 2022-08-02 NOTE — PROGRESS NOTES
"Subjective:       Patient ID: Blaine Elmore is a 88 y.o. male.    Chief Complaint: Annual Exam    HPI   Patient presents to the clinic today for wellness examination.  Current and past medical history reviewed  Pertinent family and social history reviewed    Colorectal cancer screening:  No longer recommended  Prostate CA screening:  Prostate CA screening reviewed, patient still has elevated PSAs, will see a urologist from this point forward  Vaccinations due:  All vaccinations due and/or desired have been addressed and given.    I started him on Flomax because of urinary frequency at his last visit, he saw a urologist, had some side effects with the Flomax, was changed to Proscar.  No side effects with the Proscar, will be going back to see the urologist.  Does continue to have a moderate amount of urinary frequency.    Review of Systems   Constitutional: Negative.  Negative for fatigue and fever.   HENT: Negative.  Negative for sore throat and trouble swallowing.    Eyes: Negative.  Negative for redness and visual disturbance.   Respiratory: Negative.  Negative for chest tightness and shortness of breath.    Cardiovascular: Negative.  Negative for chest pain.   Gastrointestinal: Negative.  Negative for abdominal pain, blood in stool, change in bowel habit, nausea and change in bowel habit.   Endocrine: Negative.  Negative for cold intolerance, heat intolerance and polyuria.   Genitourinary: Negative.    Musculoskeletal: Negative.  Negative for arthralgias, gait problem and joint swelling.   Integumentary:  Negative for rash. Negative.   Neurological: Negative.  Negative for dizziness, weakness and headaches.   Psychiatric/Behavioral: Negative.  Negative for agitation and dysphoric mood.         Objective:     Vitals:    08/03/22 0809   BP: 137/76   Pulse: 70   Resp: 18   Temp: 98.4 °F (36.9 °C)   SpO2: 97%   Weight: 74.4 kg (164 lb)   Height: 5' 10" (1.778 m)   Body mass index is 23.53 kg/m².     Physical " Exam  Constitutional:       Appearance: Normal appearance.   HENT:      Head: Normocephalic.      Nose: Nose normal.      Mouth/Throat:      Pharynx: Oropharynx is clear.   Eyes:      Conjunctiva/sclera: Conjunctivae normal.   Cardiovascular:      Rate and Rhythm: Normal rate and regular rhythm.   Pulmonary:      Effort: Pulmonary effort is normal.      Breath sounds: Normal breath sounds.   Abdominal:      General: Abdomen is flat. Bowel sounds are normal.      Palpations: Abdomen is soft.   Musculoskeletal:         General: Normal range of motion.      Cervical back: Neck supple.   Skin:     General: Skin is warm and dry.   Neurological:      General: No focal deficit present.      Mental Status: He is alert and oriented to person, place, and time.   Psychiatric:         Mood and Affect: Mood normal.         Behavior: Behavior normal.           Lab Results   Component Value Date     06/20/2022    K 4.5 06/20/2022    CO2 24 06/20/2022    BUN 22.0 06/20/2022    CREATININE 1.01 06/20/2022    EGFRNONAA >60 06/20/2022    AST 17 06/20/2022    ALT 16 06/20/2022    CHOL 229 (H) 06/20/2022    .00 (H) 06/20/2022    TRIG 92 06/20/2022    HDL 66 (H) 06/20/2022    TSH 0.4222 06/20/2022    HGBA1C 5.5 06/20/2022    PSA 7.93 (H) 06/20/2022    WBC 5.2 06/20/2022    RBC 4.91 06/20/2022    HGB 15.3 06/20/2022    HCT 46.3 06/20/2022    MCV 94.3 (H) 06/20/2022     06/20/2022      Assessment:     Problem List Items Addressed This Visit        Cardiac/Vascular    Primary hypertension (Chronic)    Current Assessment & Plan     Blood pressures appear to be adequately controlled with current treatment plan, continue medical management and continue home blood pressure checks.  Blood pressure should be checked as discussed during medical visits, and average blood pressure should be no greater than 130/80.           Dyslipidemia (Chronic)    Current Assessment & Plan     Some elevation of lipids, but not significant,  continue current evaluation, patient is statin intolerant.              Renal/    Urinary frequency (Chronic)    Elevated PSA (Chronic)    Current Assessment & Plan     Patient will continue to see his urologist.              Endocrine    Prediabetes    Current Assessment & Plan     Previous A1c 6.4% several years ago, currently his A1c is normal, this will be resolved as a chronic condition but we will continue to monitor.             Other Visit Diagnoses     Wellness examination    -  Primary             Medication List with Changes/Refills   Current Medications    AMLODIPINE (NORVASC) 10 MG TABLET    Take 10 mg by mouth once daily.    ASPIRIN (ECOTRIN) 81 MG EC TABLET    Take 81 mg by mouth.    FINASTERIDE (PROSCAR) 5 MG TABLET    Take 5 mg by mouth once daily.    LOSARTAN (COZAAR) 100 MG TABLET    Take 100 mg by mouth once daily.    MELATONIN (MELATIN) 3 MG TABLET    Take 3 mg by mouth.    VIT A/VIT C/VIT E/ZINC/COPPER (ICAPS AREDS ORAL)    Take by mouth.   Discontinued Medications    TAMSULOSIN (FLOMAX) 0.4 MG CAP    Take 1 capsule (0.4 mg total) by mouth once daily.     Plan:             Follow up in about 6 months (around 2/3/2023) for Chronic condition F/up.

## 2022-08-03 ENCOUNTER — OFFICE VISIT (OUTPATIENT)
Dept: PRIMARY CARE CLINIC | Facility: CLINIC | Age: 87
End: 2022-08-03
Payer: MEDICARE

## 2022-08-03 VITALS
BODY MASS INDEX: 23.48 KG/M2 | RESPIRATION RATE: 18 BRPM | TEMPERATURE: 98 F | DIASTOLIC BLOOD PRESSURE: 76 MMHG | SYSTOLIC BLOOD PRESSURE: 137 MMHG | WEIGHT: 164 LBS | OXYGEN SATURATION: 97 % | HEART RATE: 70 BPM | HEIGHT: 70 IN

## 2022-08-03 DIAGNOSIS — I10 PRIMARY HYPERTENSION: Chronic | ICD-10-CM

## 2022-08-03 DIAGNOSIS — E78.5 DYSLIPIDEMIA: Chronic | ICD-10-CM

## 2022-08-03 DIAGNOSIS — R73.03 PREDIABETES: ICD-10-CM

## 2022-08-03 DIAGNOSIS — R97.20 ELEVATED PSA: Chronic | ICD-10-CM

## 2022-08-03 DIAGNOSIS — Z00.00 WELLNESS EXAMINATION: Primary | ICD-10-CM

## 2022-08-03 DIAGNOSIS — R35.0 URINARY FREQUENCY: Chronic | ICD-10-CM

## 2022-08-03 PROCEDURE — 99397 PER PM REEVAL EST PAT 65+ YR: CPT | Mod: GZ,,, | Performed by: GENERAL PRACTICE

## 2022-08-03 PROCEDURE — 99397 PR PREVENTIVE VISIT,EST,65 & OVER: ICD-10-PCS | Mod: GZ,,, | Performed by: GENERAL PRACTICE

## 2022-08-03 RX ORDER — FINASTERIDE 5 MG/1
5 TABLET, FILM COATED ORAL DAILY
COMMUNITY
Start: 2022-06-24

## 2022-08-03 NOTE — ASSESSMENT & PLAN NOTE
Some elevation of lipids, but not significant, continue current evaluation, patient is statin intolerant.

## 2022-08-03 NOTE — ASSESSMENT & PLAN NOTE
Previous A1c 6.4% several years ago, currently his A1c is normal, this will be resolved as a chronic condition but we will continue to monitor.

## 2022-08-12 DIAGNOSIS — C18.0 MALIGNANT NEOPLASM OF CECUM: Primary | ICD-10-CM

## 2022-08-18 NOTE — PROGRESS NOTES
Subjective:       Patient ID: Blaine Elmore is a 88 y.o. male.    Chief Complaint: I'm OK    HPI  Diagnosis: Stage IIIA colon cancer (T2 N1b M0)                     Iron deficiency anemia - resolved                     + COVID-19 vaccinated    Treatment History  Adjuvant Xeloda x 6 months (completed 5/18)    Current Treatment: Observation    Clinical History:  Patient initially presented with painless rectal bleeding on Plavix. Colonoscopy 9/11/17 showed a malignant-appearing mass in the cecum. Biopsy was positive for moderately differentiated adenocarcinoma. Preoperative CEA level was 3.5 ng/mL. CT scans of the chest, abdomen and pelvis 9/12/17 showed severe hepatic steatosis and hypodense lesions compatible with cysts. There was a 1.9 cm lytic appearing lesion in the right ischium and 3-4 mm bilateral pulmonary nodules. Bone scan 9/18/17 showed uptake in the proximal left tibia but no abnormal activity in the pelvis. Plain films showed no significant abnormalities. He underwent a right hemicolectomy 9/22/17. Final pathology revealed a 3.7 cm moderately differentiated adenocarcinoma extending into but not through the muscularis propria. Resection margins were clear. 2 out of 24 pericolonic lymph nodes were positive. He presented to [facility] for a treatment opinion. He was staged as IIIA. Recommendations were for adjuvant treatment with CAPOX for 3 months versus oral Xeloda for 6 months. He remained weak postoperatively, and decided he did not want to consider infusional chemotherapy.    [facility] to establish local Oncology care.  Treatment options were reviewed and discussed again.  He opted for therapy with oral Xeloda for 6 months as tolerated. Laboratory showed evidence of iron deficiency anemia which was treated with IV Injectafer x 2 doses with recovery of his blood counts.  He initiated Xeloda 11/27/17 and completed 6 months of therapy 5/18 without significant side  effects. Follow-up CT scans of the chest, abdomen and pelvis 5/30/18 showed stable hypoattenuating liver lesions bilaterally, largest on the right measured 14 mm. There were stable on an outside CT scan and felt to represent cysts. There were also small, nonspecific pulmonary nodules, none measuring more than 4 mm that were also stable compared to his prior films. Follow-up CT scans 9/27/18 showed similar findings. There was also a stable lytic lesion in the right ischial tuberosity and right L1 pedicle and a mixed lytic/sclerotic focus in the right fourth rib. He had no associated symptoms. CT PET scan 2/5/19 showed unchanged small punctate pulmonary nodules with no significant hypermetabolic activity. 14 mm right hepatic lobe hypodensity showed no significant glucose uptake. The lucent lesions described in the right ilium, L1 and right fourth rib showed no glucose uptake. He has had no evidence of disease recurrence on follow-up and additional surveillance imaging.    Interval History  He returns to the office today by himself for a 6 month surveillance visit.  He has not had any significant problems or illnesses since his last follow-up visit.  He has no abdominal symptoms or complaints.  No changes in his bowel habits, no melena or hematochezia.  He will be 5 years out from his initial diagnosis and surgical resection in September.  Laboratory testing showed no significant anemia.  CMP was unremarkable and serum CEA level was normal.    Review of Systems   Constitutional: Negative for appetite change, fatigue and fever.   HENT: Negative for mouth sores, sore throat and trouble swallowing.    Eyes: Negative.    Respiratory: Negative for cough, chest tightness and shortness of breath.    Cardiovascular: Negative for chest pain, palpitations and leg swelling.   Gastrointestinal: Negative for abdominal distention, abdominal pain, blood in stool, change in bowel habit, constipation, diarrhea, nausea, vomiting and  "change in bowel habit.   Genitourinary: Negative for dysuria, frequency and urgency.   Musculoskeletal: Negative for arthralgias and back pain.   Integumentary:  Negative for rash and mole/lesion.   Hematological: Negative for adenopathy.   Psychiatric/Behavioral:        Bipolar disorder - stable       PMHx:  CAD, HTN, hypercholesterolemia, arthritis, diabetes mellitus with neuropathy, bipolar disorder, BPH, cataracts, melanoma right back 2005, anemia  PSHx:  Right hemicolectomy 9/17, colonoscopy, tonsillectomy, melanoma resection right lower back, Mediport, excision left arm lesion  SH:  No cigarettes, + smokeless tobacco, quit 2014.  FH:  His father had multiple myeloma.       Objective:        BP (!) 145/77 (BP Location: Right arm, Patient Position: Sitting, BP Method: Large (Automatic))   Pulse 70   Temp 97.3 °F (36.3 °C)   Resp 18   Ht 5' 10" (1.778 m)   Wt 75.4 kg (166 lb 4.8 oz)   BMI 23.86 kg/m²    Physical Exam  Constitutional:       Appearance: Normal appearance.   HENT:      Head: Normocephalic and atraumatic.      Nose: Nose normal.      Mouth/Throat:      Mouth: Mucous membranes are moist.      Pharynx: Oropharynx is clear. No posterior oropharyngeal erythema.   Eyes:      Extraocular Movements: Extraocular movements intact.      Conjunctiva/sclera: Conjunctivae normal.      Pupils: Pupils are equal, round, and reactive to light.   Cardiovascular:      Rate and Rhythm: Normal rate and regular rhythm.      Heart sounds: No murmur heard.  Pulmonary:      Breath sounds: Normal breath sounds.   Abdominal:      General: Bowel sounds are normal. There is no distension.      Palpations: Abdomen is soft.      Tenderness: There is no abdominal tenderness.      Comments: Well-healed laparoscopic incision sites.  No palpable masses or organomegaly.   Musculoskeletal:         General: No swelling or tenderness. Normal range of motion.      Cervical back: Normal range of motion and neck supple. No tenderness. "   Lymphadenopathy:      Cervical: No cervical adenopathy.   Skin:     General: Skin is warm and dry.      Findings: No lesion or rash.      Comments: MediPort removal incision right chest wall.   Neurological:      General: No focal deficit present.      Mental Status: He is alert and oriented to person, place, and time.      Cranial Nerves: No cranial nerve deficit.      Gait: Gait normal.       ECOG SCORE    1 - Restricted in strenuous activity-ambulatory and able to carry out work of a light nature          LABORATORY  Recent Results (from the past 168 hour(s))   CEA    Collection Time: 08/19/22  7:53 AM   Result Value Ref Range    Carcinoembryonic Antigen 2.33 0.00 - 3.00 ng/mL   Comprehensive Metabolic Panel    Collection Time: 08/19/22  7:53 AM   Result Value Ref Range    Sodium Level 140 136 - 145 mmol/L    Potassium Level 4.5 3.5 - 5.1 mmol/L    Chloride 108 (H) 98 - 107 mmol/L    Carbon Dioxide 25 23 - 31 mmol/L    Glucose Level 100 82 - 115 mg/dL    Blood Urea Nitrogen 14.9 8.4 - 25.7 mg/dL    Creatinine 0.95 0.73 - 1.18 mg/dL    Calcium Level Total 9.5 8.8 - 10.0 mg/dL    Protein Total 7.3 5.8 - 7.6 gm/dL    Albumin Level 4.0 3.4 - 4.8 gm/dL    Globulin 3.3 2.4 - 3.5 gm/dL    Albumin/Globulin Ratio 1.2 1.1 - 2.0 ratio    Bilirubin Total 0.6 <=1.5 mg/dL    Alkaline Phosphatase 110 40 - 150 unit/L    Alanine Aminotransferase 17 0 - 55 unit/L    Aspartate Aminotransferase 15 5 - 34 unit/L    eGFR >60 mls/min/1.73/m2   CBC with Differential    Collection Time: 08/19/22  7:53 AM   Result Value Ref Range    WBC 6.0 4.5 - 11.5 x10(3)/mcL    RBC 4.84 4.70 - 6.10 x10(6)/mcL    Hgb 14.7 14.0 - 18.0 gm/dL    Hct 45.3 42.0 - 52.0 %    MCV 93.6 80.0 - 94.0 fL    MCH 30.4 27.0 - 31.0 pg    MCHC 32.5 (L) 33.0 - 36.0 mg/dL    RDW 12.6 11.5 - 17.0 %    Platelet 165 130 - 400 x10(3)/mcL    MPV 9.5 7.4 - 10.4 fL    Neut % 63.0 %    Lymph % 21.2 %    Mono % 11.1 %    Eos % 3.7 %    Basophil % 0.8 %    Lymph # 1.26 0.6 - 4.6  x10(3)/mcL    Neut # 3.8 2.1 - 9.2 x10(3)/mcL    Mono # 0.66 0.1 - 1.3 x10(3)/mcL    Eos # 0.22 0 - 0.9 x10(3)/mcL    Baso # 0.05 0 - 0.2 x10(3)/mcL    IG# 0.01 0 - 0.04 x10(3)/mcL    IG% 0.2 %            Assessment:   Stage III colon cancer - REGINALD      Plan:   Patient is essentially 5 years out from his initial diagnosis and surgical resection.  He has no clinical or laboratory findings suspicious for disease recurrence.  Surveillance colonoscopy is not recommended secondary to his advanced age.  He would like to continue 6 month surveillance visits for his peace of mind.  RTC in 6 months for a follow-up visit and clinical exam.      SAMY NDIAYE MD    Other Physicians  Dr. Luis Jorgensen ([facility])

## 2022-08-19 ENCOUNTER — LAB VISIT (OUTPATIENT)
Dept: LAB | Facility: HOSPITAL | Age: 87
End: 2022-08-19
Attending: INTERNAL MEDICINE
Payer: MEDICARE

## 2022-08-19 DIAGNOSIS — C18.0 MALIGNANT NEOPLASM OF CECUM: ICD-10-CM

## 2022-08-19 LAB
ALBUMIN SERPL-MCNC: 4 GM/DL (ref 3.4–4.8)
ALBUMIN/GLOB SERPL: 1.2 RATIO (ref 1.1–2)
ALP SERPL-CCNC: 110 UNIT/L (ref 40–150)
ALT SERPL-CCNC: 17 UNIT/L (ref 0–55)
AST SERPL-CCNC: 15 UNIT/L (ref 5–34)
BASOPHILS # BLD AUTO: 0.05 X10(3)/MCL (ref 0–0.2)
BASOPHILS NFR BLD AUTO: 0.8 %
BILIRUBIN DIRECT+TOT PNL SERPL-MCNC: 0.6 MG/DL
BUN SERPL-MCNC: 14.9 MG/DL (ref 8.4–25.7)
CALCIUM SERPL-MCNC: 9.5 MG/DL (ref 8.8–10)
CEA SERPL-MCNC: 2.33 NG/ML (ref 0–3)
CHLORIDE SERPL-SCNC: 108 MMOL/L (ref 98–107)
CO2 SERPL-SCNC: 25 MMOL/L (ref 23–31)
CREAT SERPL-MCNC: 0.95 MG/DL (ref 0.73–1.18)
EOSINOPHIL # BLD AUTO: 0.22 X10(3)/MCL (ref 0–0.9)
EOSINOPHIL NFR BLD AUTO: 3.7 %
ERYTHROCYTE [DISTWIDTH] IN BLOOD BY AUTOMATED COUNT: 12.6 % (ref 11.5–17)
GFR SERPLBLD CREATININE-BSD FMLA CKD-EPI: >60 MLS/MIN/1.73/M2
GLOBULIN SER-MCNC: 3.3 GM/DL (ref 2.4–3.5)
GLUCOSE SERPL-MCNC: 100 MG/DL (ref 82–115)
HCT VFR BLD AUTO: 45.3 % (ref 42–52)
HGB BLD-MCNC: 14.7 GM/DL (ref 14–18)
IMM GRANULOCYTES # BLD AUTO: 0.01 X10(3)/MCL (ref 0–0.04)
IMM GRANULOCYTES NFR BLD AUTO: 0.2 %
LYMPHOCYTES # BLD AUTO: 1.26 X10(3)/MCL (ref 0.6–4.6)
LYMPHOCYTES NFR BLD AUTO: 21.2 %
MCH RBC QN AUTO: 30.4 PG (ref 27–31)
MCHC RBC AUTO-ENTMCNC: 32.5 MG/DL (ref 33–36)
MCV RBC AUTO: 93.6 FL (ref 80–94)
MONOCYTES # BLD AUTO: 0.66 X10(3)/MCL (ref 0.1–1.3)
MONOCYTES NFR BLD AUTO: 11.1 %
NEUTROPHILS # BLD AUTO: 3.8 X10(3)/MCL (ref 2.1–9.2)
NEUTROPHILS NFR BLD AUTO: 63 %
PLATELET # BLD AUTO: 165 X10(3)/MCL (ref 130–400)
PMV BLD AUTO: 9.5 FL (ref 7.4–10.4)
POTASSIUM SERPL-SCNC: 4.5 MMOL/L (ref 3.5–5.1)
PROT SERPL-MCNC: 7.3 GM/DL (ref 5.8–7.6)
RBC # BLD AUTO: 4.84 X10(6)/MCL (ref 4.7–6.1)
SODIUM SERPL-SCNC: 140 MMOL/L (ref 136–145)
WBC # SPEC AUTO: 6 X10(3)/MCL (ref 4.5–11.5)

## 2022-08-19 PROCEDURE — 82378 CARCINOEMBRYONIC ANTIGEN: CPT

## 2022-08-19 PROCEDURE — 85025 COMPLETE CBC W/AUTO DIFF WBC: CPT

## 2022-08-19 PROCEDURE — 80053 COMPREHEN METABOLIC PANEL: CPT

## 2022-08-19 PROCEDURE — 36415 COLL VENOUS BLD VENIPUNCTURE: CPT

## 2022-08-22 ENCOUNTER — OFFICE VISIT (OUTPATIENT)
Dept: HEMATOLOGY/ONCOLOGY | Facility: CLINIC | Age: 87
End: 2022-08-22
Payer: MEDICARE

## 2022-08-22 VITALS
HEART RATE: 70 BPM | WEIGHT: 166.31 LBS | SYSTOLIC BLOOD PRESSURE: 145 MMHG | BODY MASS INDEX: 23.81 KG/M2 | RESPIRATION RATE: 18 BRPM | HEIGHT: 70 IN | DIASTOLIC BLOOD PRESSURE: 77 MMHG | TEMPERATURE: 97 F

## 2022-08-22 DIAGNOSIS — C18.0 MALIGNANT NEOPLASM OF CECUM: Primary | ICD-10-CM

## 2022-08-22 PROCEDURE — 99213 OFFICE O/P EST LOW 20 MIN: CPT | Mod: PBBFAC | Performed by: INTERNAL MEDICINE

## 2022-08-22 PROCEDURE — 99999 PR PBB SHADOW E&M-EST. PATIENT-LVL III: ICD-10-PCS | Mod: PBBFAC,,, | Performed by: INTERNAL MEDICINE

## 2022-08-22 PROCEDURE — 99214 OFFICE O/P EST MOD 30 MIN: CPT | Mod: S$PBB,,, | Performed by: INTERNAL MEDICINE

## 2022-08-22 PROCEDURE — 99999 PR PBB SHADOW E&M-EST. PATIENT-LVL III: CPT | Mod: PBBFAC,,, | Performed by: INTERNAL MEDICINE

## 2022-08-22 PROCEDURE — 99214 PR OFFICE/OUTPT VISIT, EST, LEVL IV, 30-39 MIN: ICD-10-PCS | Mod: S$PBB,,, | Performed by: INTERNAL MEDICINE

## 2022-08-22 NOTE — LETTER
August 22, 2022        Luis Johnson MD  [address] [place] [address].  [address]  [facility] [address]             [facility]  [address], [address]  [facility] [address]  Phone: [phone]   Patient: Blaine Elmore   MR Number: [id number]   YOB: 1934   Date of Visit: 8/22/2022       Dear Dr. Johnson:    Thank you for referring Blaine Elmore to me for evaluation. Below are the relevant portions of my assessment and plan of care.            If you have questions, please do not hesitate to call me. I look forward to following Blaine along with you.    Sincerely,      Tone Gerber MD           CC  No Recipients

## 2022-08-24 ENCOUNTER — TELEPHONE (OUTPATIENT)
Dept: HEMATOLOGY/ONCOLOGY | Facility: CLINIC | Age: 87
End: 2022-08-24
Payer: MEDICARE

## 2023-01-05 ENCOUNTER — OFFICE VISIT (OUTPATIENT)
Dept: PRIMARY CARE CLINIC | Facility: CLINIC | Age: 88
End: 2023-01-05
Payer: MEDICARE

## 2023-01-05 VITALS
HEART RATE: 75 BPM | HEIGHT: 70 IN | DIASTOLIC BLOOD PRESSURE: 85 MMHG | BODY MASS INDEX: 23.96 KG/M2 | OXYGEN SATURATION: 96 % | SYSTOLIC BLOOD PRESSURE: 145 MMHG | WEIGHT: 167.38 LBS

## 2023-01-05 DIAGNOSIS — E78.5 DYSLIPIDEMIA: Chronic | ICD-10-CM

## 2023-01-05 DIAGNOSIS — R97.20 ELEVATED PSA: Chronic | ICD-10-CM

## 2023-01-05 DIAGNOSIS — R73.03 PREDIABETES: Chronic | ICD-10-CM

## 2023-01-05 DIAGNOSIS — I10 PRIMARY HYPERTENSION: Primary | Chronic | ICD-10-CM

## 2023-01-05 DIAGNOSIS — F31.32 BIPOLAR AFFECTIVE DISORDER, CURRENTLY DEPRESSED, MODERATE: Chronic | ICD-10-CM

## 2023-01-05 DIAGNOSIS — Z00.00 WELLNESS EXAMINATION: ICD-10-CM

## 2023-01-05 PROCEDURE — 99213 PR OFFICE/OUTPT VISIT, EST, LEVL III, 20-29 MIN: ICD-10-PCS | Mod: ,,, | Performed by: GENERAL PRACTICE

## 2023-01-05 PROCEDURE — 99213 OFFICE O/P EST LOW 20 MIN: CPT | Mod: ,,, | Performed by: GENERAL PRACTICE

## 2023-01-05 RX ORDER — SERTRALINE HYDROCHLORIDE 25 MG/1
25 TABLET, FILM COATED ORAL DAILY
COMMUNITY
End: 2023-01-05 | Stop reason: SDUPTHER

## 2023-01-05 RX ORDER — SERTRALINE HYDROCHLORIDE 25 MG/1
25 TABLET, FILM COATED ORAL DAILY
Qty: 90 TABLET | Refills: 3 | Status: SHIPPED | OUTPATIENT
Start: 2023-01-05 | End: 2024-02-01 | Stop reason: SDUPTHER

## 2023-01-05 RX ORDER — TADALAFIL 5 MG/1
5 TABLET ORAL
COMMUNITY
Start: 2022-10-07

## 2023-01-05 NOTE — ASSESSMENT & PLAN NOTE
Consume a diet low in saturated fats, (fats that are solid at room temperature such as animal fat) and trans fats, using healthy fats if necessary, olive oil and canola oil are 2 examples.  Increasing daily fiber intake can be very important in controlling cholesterol elevation as well.  Weight loss, especially weight loss associated with exercise can significantly lower lipid levels.  During future visits, depending on response to dietary changes, medication or change in dosage if already on lipid lowering medications may be considered if lipid levels continue to be significantly elevated.

## 2023-01-05 NOTE — ASSESSMENT & PLAN NOTE
Patient previously prediabetic with an A1c of 6.4 percent, numbers have improved with conservative management, not on any medication for diabetes/prediabetes.

## 2023-01-05 NOTE — PROGRESS NOTES
"Subjective:       Patient ID: Blaine Elmore is a 88 y.o. male.    Chief Complaint: Follow-up (HTN follow up)    Patient presents to the clinic today for chronic condition follow-up.  Doing well, no specific complaints, tolerating all medications, reporting no significant side effects or problems.    Last wellness exam was 06/22/2022.      Review of Systems   Constitutional: Negative.  Negative for fatigue and fever.   HENT: Negative.  Negative for sore throat and trouble swallowing.    Eyes: Negative.  Negative for redness and visual disturbance.   Respiratory: Negative.  Negative for chest tightness and shortness of breath.    Cardiovascular: Negative.  Negative for chest pain.   Gastrointestinal: Negative.  Negative for abdominal pain, blood in stool and nausea.   Endocrine: Negative.  Negative for cold intolerance, heat intolerance and polyuria.   Genitourinary: Negative.    Musculoskeletal: Negative.  Negative for arthralgias, gait problem and joint swelling.   Integumentary:  Negative for rash. Negative.   Neurological: Negative.  Negative for dizziness, weakness and headaches.   Psychiatric/Behavioral: Negative.  Negative for agitation and dysphoric mood.        Objective:     Vitals:    01/05/23 0955   BP: (!) 145/85   Pulse: 75   SpO2: 96%   Weight: 75.9 kg (167 lb 6.4 oz)   Height: 5' 10" (1.778 m)   Body mass index is 24.02 kg/m².     Physical Exam  Constitutional:       Appearance: Normal appearance.   Eyes:      Conjunctiva/sclera: Conjunctivae normal.   Cardiovascular:      Rate and Rhythm: Normal rate and regular rhythm.   Pulmonary:      Effort: Pulmonary effort is normal.      Breath sounds: Normal breath sounds.   Skin:     General: Skin is warm and dry.   Neurological:      Mental Status: He is alert.   Psychiatric:         Mood and Affect: Mood normal.         Behavior: Behavior normal.         Lab Results   Component Value Date     08/19/2022    K 4.5 08/19/2022    CO2 25 08/19/2022    " BUN 14.9 08/19/2022    CREATININE 0.95 08/19/2022    CALCIUM 9.5 08/19/2022    ALBUMIN 4.0 08/19/2022    BILITOT 0.6 08/19/2022    ALKPHOS 110 08/19/2022    AST 15 08/19/2022    ALT 17 08/19/2022    EGFRNONAA >60 06/20/2022     Lab Results   Component Value Date    WBC 6.0 08/19/2022    RBC 4.84 08/19/2022    HGB 14.7 08/19/2022    HCT 45.3 08/19/2022    MCV 93.6 08/19/2022    RDW 12.6 08/19/2022     08/19/2022      Lab Results   Component Value Date    CHOL 229 (H) 06/20/2022    TRIG 92 06/20/2022    HDL 66 (H) 06/20/2022    .00 (H) 06/20/2022    TOTALCHOLEST 3 06/20/2022     Lab Results   Component Value Date    TSH 0.4222 06/20/2022     Lab Results   Component Value Date    HGBA1C 5.5 06/20/2022        Lab Results   Component Value Date    PSA 7.93 (H) 06/20/2022         Assessment:     Problem List Items Addressed This Visit          Psychiatric    Bipolar affective disorder, currently depressed, moderate (Chronic)    Current Assessment & Plan     Medical condition is currently stable, continue current treatment plan.         Relevant Medications    sertraline (ZOLOFT) 25 MG tablet       Cardiac/Vascular    Primary hypertension - Primary (Chronic)    Current Assessment & Plan     Blood pressures appear to be adequately controlled with current treatment plan, including prescription blood pressure medication. Continue medical management and continue home blood pressure checks.  Blood pressure should be checked as discussed during medical visits, and average blood pressure should be no greater than 130/80.         Relevant Orders    Hemoglobin A1C    TSH    Lipid Panel    Dyslipidemia (Chronic)    Current Assessment & Plan     Consume a diet low in saturated fats, (fats that are solid at room temperature such as animal fat) and trans fats, using healthy fats if necessary, olive oil and canola oil are 2 examples.  Increasing daily fiber intake can be very important in controlling cholesterol elevation  as well.  Weight loss, especially weight loss associated with exercise can significantly lower lipid levels.  During future visits, depending on response to dietary changes, medication or change in dosage if already on lipid lowering medications may be considered if lipid levels continue to be significantly elevated.         Relevant Orders    Lipid Panel       Renal/    Elevated PSA (Chronic)    Current Assessment & Plan     Continue follow-up with Urology.            Endocrine    Prediabetes (Chronic)    Current Assessment & Plan     Patient previously prediabetic with an A1c of 6.4 percent, numbers have improved with conservative management, not on any medication for diabetes/prediabetes.         Relevant Orders    Hemoglobin A1C     Other Visit Diagnoses       Wellness examination        This diagnosis does not apply to this visit, wellness exam with pre visit labs will be scheduled/ordered for future visit.    Relevant Orders    Hemoglobin A1C    TSH    Lipid Panel               Medication List with Changes/Refills   Current Medications    AMLODIPINE (NORVASC) 10 MG TABLET    Take 10 mg by mouth once daily.    ASPIRIN (ECOTRIN) 81 MG EC TABLET    Take 81 mg by mouth.    FINASTERIDE (PROSCAR) 5 MG TABLET    Take 5 mg by mouth once daily.    LOSARTAN (COZAAR) 100 MG TABLET    Take 100 mg by mouth once daily.    MELATONIN (MELATIN) 3 MG TABLET    Take 3 mg by mouth.    TADALAFIL (CIALIS) 5 MG TABLET    Take 5 mg by mouth.    VIT A/VIT C/VIT E/ZINC/COPPER (ICAPS AREDS ORAL)    Take by mouth.    VITAMIN B COMPLEX (SUPER B COMPLEX-B-12 ORAL)    Take by mouth.   Changed and/or Refilled Medications    Modified Medication Previous Medication    SERTRALINE (ZOLOFT) 25 MG TABLET sertraline (ZOLOFT) 25 MG tablet       Take 1 tablet (25 mg total) by mouth once daily.    Take 25 mg by mouth once daily.     Plan:             Follow up in about 26 weeks (around 7/6/2023) for Medicare Wellness.

## 2023-01-30 DIAGNOSIS — I10 PRIMARY HYPERTENSION: Primary | ICD-10-CM

## 2023-01-30 RX ORDER — AMLODIPINE BESYLATE 10 MG/1
10 TABLET ORAL DAILY
Qty: 90 TABLET | Refills: 3 | Status: SHIPPED | OUTPATIENT
Start: 2023-01-30 | End: 2024-01-22 | Stop reason: SDUPTHER

## 2023-01-30 RX ORDER — LOSARTAN POTASSIUM 100 MG/1
100 TABLET ORAL DAILY
Qty: 90 TABLET | Refills: 3 | Status: SHIPPED | OUTPATIENT
Start: 2023-01-30 | End: 2024-01-22 | Stop reason: SDUPTHER

## 2023-01-30 NOTE — TELEPHONE ENCOUNTER
----- Message from Martha Rankin sent at 1/30/2023  7:24 AM CST -----  Regarding: refill  Type:  RX Refill Request    Who Called: pt  Refill or New Rx:refill  RX Name and Strength:amLODIPine (NORVASC) 10 MG tablet  How is the patient currently taking it? (ex. 1XDay):1 day  Is this a 30 day or 90 day RX:90  Refill or New Rx:refill  RX Name and Strength:losartan (COZAAR) 100 MG tablet  How is the patient currently taking it? (ex. 1XDay):1 day  Is this a 30 day or 90 day RX:90  Preferred Pharmacy with phone number:[facility] [place]  Local or Mail Order:local  Ordering Provider:howard cope  Would the patient rather a call back or a response via [facility]? C/b  Best Call Back Number:[phone]  Additional Information:  pt is all out of medication and is leaving for out of town tomorrow morning please refill medication today.

## 2023-01-30 NOTE — TELEPHONE ENCOUNTER
----- Message from Amy Grimes sent at 1/30/2023  3:53 PM CST -----  Regarding: advice  Pt called about wanting to know his psa level from June 22.  [phone]

## 2023-01-30 NOTE — TELEPHONE ENCOUNTER
Gave pt his PSA results form June 2022   Neurology Note      18    Chief Complaint   Patient presents with    Follow-up     headaches       HPI/Subjective  Tiny Echeverria is a 39 y.o. female who presented to the neurology office for management of headaches. The patient started having headaches when she was in college. With time it has been getting better. She usually gets a headache once a month and it is in the right occipital area and then it radiates to the frontal area. It is associated with photophobia and phonophobia. She does not get any nausea or vomiting now but in the past she has been nauseous with her headaches. She takes Maxalt and the headaches resolved in around 30 minutes. If she does not take any medications the headaches can last for 2 days. The headaches are 10 out of 10 in severity. She was living in [place] but moved to [place] in 2017. She does have history of kidney stones ×1 but still wants to continue to take the Topamax. Risk Factors for headaches  Smokin  Coffee: 2 cups/day  Tea: 0 cups/day  Soda: Rarely cans/day  Water: 8 glasses/day  Sleeps at 10:30 PM and wakes up at 6:30 AM . She does not snore. Medications tried  Abortive therapy:  Rizatriptan    Preventative therapy:  Topamax 100 mg p.o. nightly    Current Outpatient Prescriptions   Medication Sig    TROKENDI  mg capsule Take 1 Cap by mouth daily.  cetirizine HCl (ZYRTEC PO) Take  by mouth.  multivit-minerals/ferrous fum (MULTI VITAMIN PO) Take  by mouth.  rizatriptan (MAXALT) 10 mg tablet Take 1 Tab by mouth once as needed for Migraine for up to 1 dose. May repeat in 2 hours if needed     No current facility-administered medications for this visit. Allergies   Allergen Reactions    Codeine Rash     Past Medical History:   Diagnosis Date    Neurological disorder      No past surgical history on file. No family history on file.   Social History   Substance Use Topics    Smoking status: Never Smoker  Smokeless tobacco: Never Used    Alcohol use Not on file       REVIEW OF SYSTEMS:   A ten system review of constitutional, cardiovascular, respiratory, musculoskeletal, endocrine, skin, SHEENT, genitourinary, psychiatric and neurologic systems was obtained and is unremarkable with the exception of Fatigue, frequent headaches, muscle pain, muscle weakness, neuropathy      EXAMINATION:   Visit Vitals    /78    Pulse 72    Ht 5' 7\" (1.702 m)    Wt 247 lb (112 kg)    SpO2 98%    BMI 38.69 kg/m2        General:   General appearance: Pt is in no acute distress   Distal pulses are preserved    Neurological Examination:   Mental Status: AAO x3. Speech is fluent. Follows commands, has normal fund of knowledge, attention, short term recall, comprehension and insight. Cranial Nerves: Visual fields are full. PERRL, Extraocular movements are full. Facial sensation intact. Facial movement intact. Hearing intact to conversation. Palate elevates symmetrically. Shoulder shrug symmetric. Tongue midline. Motor: Strength is 5/5 in all 4 ext. No atrophy. Tone: Normal    Sensation: Normal to light touch     Coordination/Cerebellar: Intact to finger-nose-finger     Gait: Casual gait is normal.     Skin: No significant bruising or lacerations. Laboratory review:   No results found for this or any previous visit. Imaging review:  None    Documentation review:  None    Assessment/Plan:   Ashia Desai is a 39 y.o. female who presented to the neurology office for management of migraines without aura. She is presently taking Trokendi 100 mg p.o. daily. Paresthesia has improved. She takes rizatriptan prn. Follow-up in 6 months    Over 25 minutes was spent with the patient of which > 50% of the visit was spent counseling on diagnosis, management, and treatment of the diagnosis. I did discuss about topamax and pregnancy.       PHQ over the last two weeks 5/22/2018   Little interest or pleasure in doing things Not at all   Feeling down, depressed, irritable, or hopeless Not at all   Total Score PHQ 2 0     Primary care to address possible depression if PHQ-9 score is more than 9. ICD-10-CM ICD-9-CM    1. Migraine without aura and without status migrainosus, not intractable G43.009 346.10    2. Paresthesia R20.2 782.0       Thank you for allowing me to participate in the care of Ms. Martinez. Please feel free to contact me if you have any questions. Hoyle Krabbe, MD  Neurologist    CC: No primary care provider on file. Fax: None    This note was created using voice recognition software. Despite editing, there may be syntax errors. Patient with mildly elevated troponin in setting of volume overload with BNP > 70149 and CASSANDRA. Likely secondary to combination of mild demand from volume overload as well as decreased renal clearance.   - No anticoagulation at this time, continue aspirin  - Trend troponin to peak

## 2023-01-31 ENCOUNTER — TELEPHONE (OUTPATIENT)
Dept: HEMATOLOGY/ONCOLOGY | Facility: CLINIC | Age: 88
End: 2023-01-31
Payer: MEDICARE

## 2023-01-31 NOTE — TELEPHONE ENCOUNTER
"Patient saw Dr. Arguelles last week and was informed that his PSA is 6.65. Dr. Arguelles is recommending an MRI, but patient feels that Dr. Gerber needs to "get involved" b/c he is treating him for colon cancer. States his PSA has gone up and down for the past 6-8 months. Please advise.  "

## 2023-07-03 ENCOUNTER — CLINICAL SUPPORT (OUTPATIENT)
Dept: PRIMARY CARE CLINIC | Facility: CLINIC | Age: 88
End: 2023-07-03
Payer: MEDICARE

## 2023-07-03 DIAGNOSIS — R73.03 PREDIABETES: Chronic | ICD-10-CM

## 2023-07-03 DIAGNOSIS — E78.5 DYSLIPIDEMIA: Chronic | ICD-10-CM

## 2023-07-03 DIAGNOSIS — I10 PRIMARY HYPERTENSION: Chronic | ICD-10-CM

## 2023-07-03 DIAGNOSIS — Z00.00 WELLNESS EXAMINATION: ICD-10-CM

## 2023-07-03 DIAGNOSIS — Z00.00 MEDICARE ANNUAL WELLNESS VISIT, INITIAL: Primary | ICD-10-CM

## 2023-07-03 LAB
CHOLEST SERPL-MCNC: 213 MG/DL
CHOLEST/HDLC SERPL: 4 {RATIO} (ref 0–5)
EST. AVERAGE GLUCOSE BLD GHB EST-MCNC: 114 MG/DL
HBA1C MFR BLD: 5.6 %
HDLC SERPL-MCNC: 56 MG/DL (ref 35–60)
LDLC SERPL CALC-MCNC: 141 MG/DL (ref 50–140)
TRIGL SERPL-MCNC: 82 MG/DL (ref 34–140)
TSH SERPL-ACNC: 1.01 UIU/ML (ref 0.35–4.94)
VLDLC SERPL CALC-MCNC: 16 MG/DL

## 2023-07-03 PROCEDURE — 36415 COLL VENOUS BLD VENIPUNCTURE: CPT

## 2023-07-03 PROCEDURE — 36415 PR COLLECTION VENOUS BLOOD,VENIPUNCTURE: ICD-10-PCS | Mod: ,,, | Performed by: GENERAL PRACTICE

## 2023-07-03 PROCEDURE — 36415 COLL VENOUS BLD VENIPUNCTURE: CPT | Mod: ,,, | Performed by: GENERAL PRACTICE

## 2023-07-03 NOTE — PROGRESS NOTES
Patient here to venipuncture/ Nurse visit  AW labs with 22 gauge needle. Patient was drawn in right antecubital.No complications or issues occurred. Patient expressed no pain.

## 2023-07-05 PROBLEM — R35.0 URINARY FREQUENCY: Chronic | Status: RESOLVED | Noted: 2022-06-22 | Resolved: 2023-07-05

## 2023-07-05 PROBLEM — R35.0 BENIGN PROSTATIC HYPERPLASIA WITH URINARY FREQUENCY: Status: ACTIVE | Noted: 2019-06-24

## 2023-07-05 PROBLEM — N40.1 BENIGN PROSTATIC HYPERPLASIA WITH URINARY FREQUENCY: Chronic | Status: ACTIVE | Noted: 2019-06-24

## 2023-07-05 PROBLEM — N40.1 BENIGN PROSTATIC HYPERPLASIA WITH URINARY FREQUENCY: Status: ACTIVE | Noted: 2019-06-24

## 2023-07-05 PROBLEM — Z85.038 HISTORY OF COLON CANCER, STAGE III: Status: ACTIVE | Noted: 2019-11-19

## 2023-07-05 PROBLEM — C18.0 ADENOCARCINOMA OF CECUM: Status: ACTIVE | Noted: 2017-10-05

## 2023-07-05 PROBLEM — R35.0 BENIGN PROSTATIC HYPERPLASIA WITH URINARY FREQUENCY: Chronic | Status: ACTIVE | Noted: 2019-06-24

## 2023-07-05 NOTE — ASSESSMENT & PLAN NOTE
Component Ref Range & Units 2 d ago 1 yr ago 4 yr ago   Hemoglobin A1c <=7.0 % 5.6  5.5  6.4 High  R    Estimated Average Glucose mg/dL 114.0  111.2  137      Most recent hemoglobin A1c continues to be in the prediabetes range.  Continue current treatment plan diet, lifestyle modification.  While it is less than optimal for blood sugars to remain in the prediabetes range, it is essential that blood sugars do not rise to the point of becoming a type II diabetic.  We will continue to monitor closely.

## 2023-07-05 NOTE — PROGRESS NOTES
Patient ID: [id number]     Chief Complaint: Follow-up and Hyperlipidemia      HPI:     Blaine Elmore is a 89 y.o. male here today for a Medicare Wellness. No other complaints today.       ----------------------------  Adenocarcinoma of cecum  Bipolar disorder, unspecified  Cataract of both eyes  Colon cancer  Depression  DJD (degenerative joint disease)  Essential (primary) hypertension  Malignant neoplasm of colon  Urinary frequency     Past Surgical History:   Procedure Laterality Date    CATARACT EXTRACTION Left     COLONOSCOPY  2017    EXCISION OF MELANOMA      right lower back    MEDIPORT INSERTION, SINGLE      RIGHT HEMICOLECTOMY  09/2017    TONSILLECTOMY         Review of patient's allergies indicates:   Allergen Reactions    Solifenacin      Other reaction(s): arrhythmia    Statins-hmg-coa reductase inhibitors Other (See Comments)     myalgias       Outpatient Medications Marked as Taking for the 7/6/23 encounter (Office Visit) with Luis Johnson MD   Medication Sig Dispense Refill    amLODIPine (NORVASC) 10 MG tablet Take 1 tablet (10 mg total) by mouth once daily. 90 tablet 3    aspirin (ECOTRIN) 81 MG EC tablet Take 81 mg by mouth.      finasteride (PROSCAR) 5 mg tablet Take 5 mg by mouth once daily.      losartan (COZAAR) 100 MG tablet Take 1 tablet (100 mg total) by mouth once daily. 90 tablet 3    melatonin (MELATIN) 3 mg tablet Take 3 mg by mouth.      sertraline (ZOLOFT) 25 MG tablet Take 1 tablet (25 mg total) by mouth once daily. 90 tablet 3    tadalafiL (CIALIS) 5 MG tablet Take 5 mg by mouth.      vit A/vit C/vit E/zinc/copper (ICAPS AREDS ORAL) Take by mouth.      vitamin B complex (SUPER B COMPLEX-B-12 ORAL) Take by mouth.         Social History     Socioeconomic History    Marital status:    Tobacco Use    Smoking status: Never    Smokeless tobacco: Former     Types: Snuff   Substance and Sexual Activity    Alcohol use: Yes     Alcohol/week: 1.0 - 2.0 standard drink     Types: 1 - 2  Cans of beer per week    Drug use: Never        Family History   Problem Relation Age of Onset    No Known Problems Mother     Multiple myeloma Father         Patient Care Team:  Luis Johnson MD as PCP - General (Family Medicine)  Tone Gerber MD as Consulting Physician (Oncology)  Roger Arguelles MD as Consulting Physician (Urology)     Opioid Screening: Patient medication list reviewed, patient is not taking prescription opioids. Patient is not using additional opioids than prescribed. Patient is at low risk of substance abuse based on this opioid use history.       Subjective:     Review of Systems   Constitutional: Negative.  Negative for malaise/fatigue and weight loss.   HENT: Negative.     Eyes: Negative.    Respiratory: Negative.  Negative for shortness of breath.    Cardiovascular: Negative.  Negative for chest pain.   Gastrointestinal: Negative.    Genitourinary: Negative.    Musculoskeletal: Negative.    Skin: Negative.    Neurological: Negative.  Negative for focal weakness, weakness and headaches.   Endo/Heme/Allergies: Negative.    Psychiatric/Behavioral: Negative.  Negative for depression and memory loss. The patient is not nervous/anxious.        Patient Reported Health Risk Assessment  What is your age?: 80 or older  Are you male or female?: Male  During the past four weeks, how much have you been bothered by emotional problems such as feeling anxious, depressed, irritable, sad, or downhearted and blue?: Not at all  During the past five weeks, has your physical and/or emotional health limited your social activities with family, friends, neighbors, or groups?: Not at all  During the past four weeks, how much bodily pain have you generally had?: No pain  During the past four weeks, was someone available to help if you needed and wanted help?: Yes, as much as I wanted  During the past four weeks, what was the hardest physical activity you could do for at least two minutes?: Light  Can you get  to places out of walking distance without help?  (For example, can you travel alone on buses or taxis, or drive your own car?): Yes  Can you go shopping for groceries or clothes without someone's help?: Yes  Can you prepare your own meals?: Yes  Can you do your own housework without help?: Yes  Because of any health problems, do you need the help of another person with your personal care needs such as eating, bathing, dressing, or getting around the house?: No  Can you handle your own money without help?: Yes  During the past four weeks, how would you rate your health in general?: Good  How have things been going for you during the past four weeks?: Pretty well  Are you having difficulties driving your car?: No  Do you always fasten your seat belt when you are in a car?: Yes, usually  How often in the past four weeks have you been bothered by falling or dizzy when standing up?: Seldom  How often in the past four weeks have you been bothered by trouble eating well?: Never  How often in the past four weeks have you been bothered by teeth or denture problems?: Never  How often in the past four weeks have you been bothered with problems using the telephone?: Never  How often in the past four weeks have you been bothered by tiredness or fatigue?: Never  Have you fallen two or more times in the past year?: No  Are you afraid of falling?: No  Are you a smoker?: No  During the past four weeks, how many drinks of wine, beer, or other alcoholic beverages did you have?: One drink or less per week  Do you exercise for about 20 minutes three or more days a week?: No, I usually do not exercise this much  Have you been given any information to help you with hazards in your house that might hurt you?: No  Have you been given any information to help you with keeping track of your medications?: No  How often do you have trouble taking medicines the way you've been told to take them?: I always take them as prescribed  How confident  "are you that you can control and manage most of your health problems?: Very confident  What is your race? (Check all that apply.):     Objective:     /74   Pulse 80   Resp 18   Ht 5' 10" (1.778 m)   Wt 75.8 kg (167 lb)   SpO2 97%   BMI 23.96 kg/m²     Physical Exam  Constitutional:       Appearance: Normal appearance.   HENT:      Head: Normocephalic.      Mouth/Throat:      Pharynx: Oropharynx is clear.   Eyes:      Conjunctiva/sclera: Conjunctivae normal.      Pupils: Pupils are equal, round, and reactive to light.   Cardiovascular:      Rate and Rhythm: Normal rate and regular rhythm.      Heart sounds: Normal heart sounds.   Pulmonary:      Effort: Pulmonary effort is normal.      Breath sounds: Normal breath sounds.   Abdominal:      General: Abdomen is flat.      Palpations: Abdomen is soft.   Musculoskeletal:         General: Normal range of motion.      Cervical back: Neck supple.   Skin:     General: Skin is warm and dry.   Neurological:      General: No focal deficit present.      Mental Status: He is oriented to person, place, and time.   Psychiatric:         Mood and Affect: Mood normal.         Behavior: Behavior normal.         Thought Content: Thought content normal.         Judgment: Judgment normal.       Lab Results   Component Value Date     08/19/2022    K 4.5 08/19/2022    CO2 25 08/19/2022    BUN 14.9 08/19/2022    CREATININE 0.95 08/19/2022    CALCIUM 9.5 08/19/2022    ALBUMIN 4.0 08/19/2022    BILITOT 0.6 08/19/2022    ALKPHOS 110 08/19/2022    AST 15 08/19/2022    ALT 17 08/19/2022    EGFRNORACEVR >60 08/19/2022     Lab Results   Component Value Date    WBC 6.0 08/19/2022    RBC 4.84 08/19/2022    HGB 14.7 08/19/2022    HCT 45.3 08/19/2022    MCV 93.6 08/19/2022    RDW 12.6 08/19/2022     08/19/2022      Lab Results   Component Value Date    CHOL 213 (H) 07/03/2023    TRIG 82 07/03/2023    HDL 56 07/03/2023    .00 (H) 07/03/2023    TOTALCHOLEST 4 " 07/03/2023     Lab Results   Component Value Date    TSH 1.014 07/03/2023     Lab Results   Component Value Date    HGBA1C 5.6 07/03/2023        Lab Results   Component Value Date    PSA 7.93 (H) 06/20/2022         No flowsheet data found.  Fall Risk Assessment - Outpatient 8/22/2022 8/3/2022 6/22/2022   Mobility Status Ambulatory Ambulatory Ambulatory   Number of falls 0 0 0   Identified as fall risk 0 0 0           Depression Screening  Over the past two weeks, has the patient felt down, depressed, or hopeless?: No  Over the past two weeks, has the patient felt little interest or pleasure in doing things?: No  Functional Ability/Safety Screening  Was the patient's timed Up & Go test unsteady or longer than 30 seconds?: No  Does the patient need help with phone, transportation, shopping, preparing meals, housework, laundry, meds, or managing money?: No  Does the patient's home have rugs in the hallway, lack grab bars in the bathroom, lack handrails on the stairs or have poor lighting?: No  Have you noticed any hearing difficulties?: No  Cognitive Function (Assessed through direct observation with due consideration of information obtained by way of patient reports and/or concerns raised by family, friends, caretakers, or others)    Does the patient repeat questions/statements in the same day?: No  Does the patient have trouble remembering the date, year, and time?: No  Does the patient have difficulty managing finances?: No  Does the patient have a decreased sense of direction?: No  Assessment:       ICD-10-CM ICD-9-CM   1. Medicare annual wellness visit, subsequent  Z00.00 V70.0   2. Primary hypertension  I10 401.9   3. Prediabetes  R73.03 790.29   4. Benign prostatic hyperplasia with urinary frequency  N40.1 600.01    R35.0 788.41   5. Elevated PSA  R97.20 790.93   6. Bipolar affective disorder, currently depressed, moderate  F31.32 296.52   7. History of colon cancer, stage III  Z85.038 V10.05        Plan:      Medicare Annual Wellness and Personalized Prevention Plan:   Fall Risk + Home Safety + Hearing Impairment + Depression Screen + Cognitive Impairment Screen + Health Risk Assessment all reviewed.       Health Maintenance Topics with due status: Not Due       Topic Last Completion Date    Influenza Vaccine 10/31/2022    Hemoglobin A1c (Prediabetes) 07/03/2023    Lipid Panel 07/03/2023      The patient's Health Maintenance was reviewed and the following appears to be due at this time:   There are no preventive care reminders to display for this patient.        1. Medicare annual wellness visit, subsequent  Comments:  Overall health status was reviewed.  Good health habits reinforced.  Annual wellness exams recommended.    2. Primary hypertension  Assessment & Plan:  Prescribed losartan 100 mg daily, amlodipine 10 mg daily.  Blood pressures appear to be adequately controlled with current treatment plan, including prescription blood pressure medication. Continue medical management and continue home blood pressure checks.  Blood pressure should be checked as discussed during medical visits, and average blood pressure should be no greater than 130/80.      3. Prediabetes  Assessment & Plan:  Component Ref Range & Units 2 d ago 1 yr ago 4 yr ago   Hemoglobin A1c <=7.0 % 5.6  5.5  6.4 High  R    Estimated Average Glucose mg/dL 114.0  111.2  137      Most recent hemoglobin A1c continues to be in the prediabetes range.  Continue current treatment plan diet, lifestyle modification.  While it is less than optimal for blood sugars to remain in the prediabetes range, it is essential that blood sugars do not rise to the point of becoming a type II diabetic.  We will continue to monitor closely.      4. Benign prostatic hyperplasia with urinary frequency  Assessment & Plan:  Sees Dr. Arguelles.  Prescribed Proscar 5 mg daily.  This medical condition is currently stable on prescription medication, continue current treatment  plan.      5. Elevated PSA  Assessment & Plan:  Continue follow-up with Urology.  He sees Dr. Arguelles.  Last PSA has improved, under five, will continue follow-up with his urologist.      6. Bipolar affective disorder, currently depressed, moderate  Assessment & Plan:  Prescribed sertraline 25 mg daily.  Patient reports stability of moods, and effectiveness of medications, including no agitation, significant somnolence, or significant bouts of anxiety or depression.  Patient is not having any sleep disturbance.  Current treatment plan will continue, with plans to discuss any significant changes in patient's status if necessary if anything changes in the future.      7. History of colon cancer, stage III  Overview:  Formatting of this note might be different from the original.  Managed by Dr. Buenrostro    Assessment & Plan:  Diagnosed approximately six years ago, thinks he will be released from Dr. Gerber's care fairly soon.           Advance Care Planning   I attest to discussing Advance Care Planning with patient and/or family member.  Education was provided including the importance of the Health Care Power of , Advance Directives, and/or [place] documentation.  The patient expressed understanding to the importance of this information and discussion.         Current Outpatient Medications   Medication Instructions    amLODIPine (NORVASC) 10 mg, Oral, Daily    aspirin (ECOTRIN) 81 mg, Oral    finasteride (PROSCAR) 5 mg, Oral, Daily    losartan (COZAAR) 100 mg, Oral, Daily    melatonin (MELATIN) 3 mg, Oral    sertraline (ZOLOFT) 25 mg, Oral, Daily    tadalafiL (CIALIS) 5 mg, Oral    vit A/vit C/vit E/zinc/copper (ICAPS AREDS ORAL) Oral    vitamin B complex (SUPER B COMPLEX-B-12 ORAL) Oral        Follow up in about 6 months (around 1/10/2024) for Chronic condition F/up. In addition to their scheduled follow up, the patient has also been instructed to follow up on as needed basis.

## 2023-07-05 NOTE — ASSESSMENT & PLAN NOTE
Prescribed losartan 100 mg daily, amlodipine 10 mg daily.  Blood pressures appear to be adequately controlled with current treatment plan, including prescription blood pressure medication. Continue medical management and continue home blood pressure checks.  Blood pressure should be checked as discussed during medical visits, and average blood pressure should be no greater than 130/80.

## 2023-07-05 NOTE — ASSESSMENT & PLAN NOTE
Continue follow-up with Urology.  He sees Dr. Arguelles.  Last PSA has improved, under five, will continue follow-up with his urologist.

## 2023-07-05 NOTE — ASSESSMENT & PLAN NOTE
Sees Dr. Arguelles.  Prescribed Proscar 5 mg daily.  This medical condition is currently stable on prescription medication, continue current treatment plan.

## 2023-07-05 NOTE — ASSESSMENT & PLAN NOTE
Prescribed sertraline 25 mg daily.  Patient reports stability of moods, and effectiveness of medications, including no agitation, significant somnolence, or significant bouts of anxiety or depression.  Patient is not having any sleep disturbance.  Current treatment plan will continue, with plans to discuss any significant changes in patient's status if necessary if anything changes in the future.

## 2023-07-06 ENCOUNTER — OFFICE VISIT (OUTPATIENT)
Dept: PRIMARY CARE CLINIC | Facility: CLINIC | Age: 88
End: 2023-07-06
Payer: MEDICARE

## 2023-07-06 VITALS
RESPIRATION RATE: 18 BRPM | OXYGEN SATURATION: 97 % | SYSTOLIC BLOOD PRESSURE: 133 MMHG | HEIGHT: 70 IN | WEIGHT: 167 LBS | DIASTOLIC BLOOD PRESSURE: 74 MMHG | HEART RATE: 80 BPM | BODY MASS INDEX: 23.91 KG/M2

## 2023-07-06 DIAGNOSIS — R97.20 ELEVATED PSA: Chronic | ICD-10-CM

## 2023-07-06 DIAGNOSIS — I10 PRIMARY HYPERTENSION: Chronic | ICD-10-CM

## 2023-07-06 DIAGNOSIS — Z85.038 HISTORY OF COLON CANCER, STAGE III: Chronic | ICD-10-CM

## 2023-07-06 DIAGNOSIS — R73.03 PREDIABETES: Chronic | ICD-10-CM

## 2023-07-06 DIAGNOSIS — F31.32 BIPOLAR AFFECTIVE DISORDER, CURRENTLY DEPRESSED, MODERATE: Chronic | ICD-10-CM

## 2023-07-06 DIAGNOSIS — N40.1 BENIGN PROSTATIC HYPERPLASIA WITH URINARY FREQUENCY: Chronic | ICD-10-CM

## 2023-07-06 DIAGNOSIS — Z00.00 MEDICARE ANNUAL WELLNESS VISIT, SUBSEQUENT: Primary | ICD-10-CM

## 2023-07-06 DIAGNOSIS — R35.0 BENIGN PROSTATIC HYPERPLASIA WITH URINARY FREQUENCY: Chronic | ICD-10-CM

## 2023-07-06 PROBLEM — C18.9 MALIGNANT NEOPLASM OF COLON: Chronic | Status: RESOLVED | Noted: 2022-06-21 | Resolved: 2023-07-06

## 2023-07-06 PROBLEM — C18.0 ADENOCARCINOMA OF CECUM: Status: RESOLVED | Noted: 2017-10-05 | Resolved: 2023-07-06

## 2023-07-06 PROBLEM — H26.9 CATARACT OF BOTH EYES: Chronic | Status: RESOLVED | Noted: 2022-06-21 | Resolved: 2023-07-06

## 2023-07-06 PROCEDURE — G0439 PR MEDICARE ANNUAL WELLNESS SUBSEQUENT VISIT: ICD-10-PCS | Mod: ,,, | Performed by: GENERAL PRACTICE

## 2023-07-06 PROCEDURE — G0439 PPPS, SUBSEQ VISIT: HCPCS | Mod: ,,, | Performed by: GENERAL PRACTICE

## 2023-07-06 NOTE — ASSESSMENT & PLAN NOTE
Diagnosed approximately six years ago, thinks he will be released from Dr. Gerber's care fairly soon.

## 2023-08-11 ENCOUNTER — LAB VISIT (OUTPATIENT)
Dept: LAB | Facility: HOSPITAL | Age: 88
End: 2023-08-11
Attending: INTERNAL MEDICINE
Payer: MEDICARE

## 2023-08-11 DIAGNOSIS — C18.0 MALIGNANT NEOPLASM OF CECUM: ICD-10-CM

## 2023-08-11 LAB
ALBUMIN SERPL-MCNC: 4 G/DL (ref 3.4–4.8)
ALBUMIN/GLOB SERPL: 1.3 RATIO (ref 1.1–2)
ALP SERPL-CCNC: 90 UNIT/L (ref 40–150)
ALT SERPL-CCNC: 14 UNIT/L (ref 0–55)
AST SERPL-CCNC: 13 UNIT/L (ref 5–34)
BASOPHILS # BLD AUTO: 0.06 X10(3)/MCL
BASOPHILS NFR BLD AUTO: 0.9 %
BILIRUB SERPL-MCNC: 0.5 MG/DL
BUN SERPL-MCNC: 18.6 MG/DL (ref 8.4–25.7)
CALCIUM SERPL-MCNC: 9.3 MG/DL (ref 8.8–10)
CEA SERPL-MCNC: 2.37 NG/ML (ref 0–3)
CHLORIDE SERPL-SCNC: 107 MMOL/L (ref 98–107)
CO2 SERPL-SCNC: 27 MMOL/L (ref 23–31)
CREAT SERPL-MCNC: 1.03 MG/DL (ref 0.73–1.18)
EOSINOPHIL # BLD AUTO: 0.3 X10(3)/MCL (ref 0–0.9)
EOSINOPHIL NFR BLD AUTO: 4.6 %
ERYTHROCYTE [DISTWIDTH] IN BLOOD BY AUTOMATED COUNT: 13 % (ref 11.5–17)
GFR SERPLBLD CREATININE-BSD FMLA CKD-EPI: >60 MLS/MIN/1.73/M2
GLOBULIN SER-MCNC: 3.1 GM/DL (ref 2.4–3.5)
GLUCOSE SERPL-MCNC: 133 MG/DL (ref 82–115)
HCT VFR BLD AUTO: 46.2 % (ref 42–52)
HGB BLD-MCNC: 14.4 G/DL (ref 14–18)
IMM GRANULOCYTES # BLD AUTO: 0.01 X10(3)/MCL (ref 0–0.04)
IMM GRANULOCYTES NFR BLD AUTO: 0.2 %
LYMPHOCYTES # BLD AUTO: 1.3 X10(3)/MCL (ref 0.6–4.6)
LYMPHOCYTES NFR BLD AUTO: 20.1 %
MCH RBC QN AUTO: 29.8 PG (ref 27–31)
MCHC RBC AUTO-ENTMCNC: 31.2 G/DL (ref 33–36)
MCV RBC AUTO: 95.5 FL (ref 80–94)
MONOCYTES # BLD AUTO: 0.64 X10(3)/MCL (ref 0.1–1.3)
MONOCYTES NFR BLD AUTO: 9.9 %
NEUTROPHILS # BLD AUTO: 4.15 X10(3)/MCL (ref 2.1–9.2)
NEUTROPHILS NFR BLD AUTO: 64.3 %
PLATELET # BLD AUTO: 190 X10(3)/MCL (ref 130–400)
PMV BLD AUTO: 9.2 FL (ref 7.4–10.4)
POTASSIUM SERPL-SCNC: 4.8 MMOL/L (ref 3.5–5.1)
PROT SERPL-MCNC: 7.1 GM/DL (ref 5.8–7.6)
RBC # BLD AUTO: 4.84 X10(6)/MCL (ref 4.7–6.1)
SODIUM SERPL-SCNC: 141 MMOL/L (ref 136–145)
WBC # SPEC AUTO: 6.46 X10(3)/MCL (ref 4.5–11.5)

## 2023-08-11 PROCEDURE — 36415 COLL VENOUS BLD VENIPUNCTURE: CPT

## 2023-08-11 PROCEDURE — 82378 CARCINOEMBRYONIC ANTIGEN: CPT

## 2023-08-11 PROCEDURE — 85025 COMPLETE CBC W/AUTO DIFF WBC: CPT

## 2023-08-11 PROCEDURE — 80053 COMPREHEN METABOLIC PANEL: CPT

## 2023-08-14 NOTE — PROGRESS NOTES
Subjective:       Patient ID: Blaine Elmore is a 89 y.o. male.    Chief Complaint: Seeing Urology for bladder issues    HPI  Diagnosis: Stage IIIA colon cancer (T2 N1b M0)                     Iron deficiency anemia - resolved                     + COVID-19 vaccinated    Treatment History  Adjuvant Xeloda x 6 months (completed 5/18)    Current Treatment: Observation    Clinical History:  Patient initially presented with painless rectal bleeding on Plavix. Colonoscopy 9/11/17 showed a malignant-appearing mass in the cecum. Biopsy was positive for moderately differentiated adenocarcinoma. Preoperative CEA level was 3.5 ng/mL. CT scans of the chest, abdomen and pelvis 9/12/17 showed severe hepatic steatosis and hypodense lesions compatible with cysts. There was a 1.9 cm lytic appearing lesion in the right ischium and 3-4 mm bilateral pulmonary nodules. Bone scan 9/18/17 showed uptake in the proximal left tibia but no abnormal activity in the pelvis. Plain films showed no significant abnormalities. He underwent a right hemicolectomy 9/22/17. Final pathology revealed a 3.7 cm moderately differentiated adenocarcinoma extending into but not through the muscularis propria. Resection margins were clear. 2 out of 24 pericolonic lymph nodes were positive. He presented to [facility] for a treatment opinion. He was staged as IIIA. Recommendations were for adjuvant treatment with CAPOX for 3 months versus oral Xeloda for 6 months. He remained weak postoperatively, and decided he did not want to consider infusional chemotherapy.    [facility] to establish local Oncology care.  Treatment options were reviewed and discussed again.  He opted for therapy with oral Xeloda for 6 months as tolerated. Laboratory showed evidence of iron deficiency anemia which was treated with IV Injectafer x 2 doses with recovery of his blood counts.  He initiated Xeloda 11/27/17 and completed 6 months of therapy 5/18  without significant side effects. Follow-up CT scans of the chest, abdomen and pelvis 5/30/18 showed stable hypoattenuating liver lesions bilaterally, largest on the right measured 14 mm. There were stable on an outside CT scan and felt to represent cysts. There were also small, nonspecific pulmonary nodules, none measuring more than 4 mm that were also stable compared to his prior films. Follow-up CT scans 9/27/18 showed similar findings. There was also a stable lytic lesion in the right ischial tuberosity and right L1 pedicle and a mixed lytic/sclerotic focus in the right fourth rib. He had no associated symptoms. CT PET scan 2/5/19 showed unchanged small punctate pulmonary nodules with no significant hypermetabolic activity. 14 mm right hepatic lobe hypodensity showed no significant glucose uptake. The lucent lesions described in the right ilium, L1 and right fourth rib showed no glucose uptake. He has had no evidence of disease recurrence on follow-up and additional surveillance imaging.    Interval History  Mr. Elmore is here today by himself for an annual surveillance visit.  He feels well.  He has chronic, intermittent constipation, but no abdominal pain, nausea, melena or hematochezia.  .  No weight loss. He is over 5 years out from his surgical resection.  He is followed annually by his PCP for wellness/medical management.  He is also being followed by Urology for an elevated PSA and urinary frequency.  Laboratory for this visit was unremarkable, including CEA, and reviewed today with the patient.    Review of Systems   Constitutional:  Negative for appetite change, fatigue and fever.   HENT:  Negative for mouth sores, sore throat and trouble swallowing.    Eyes: Negative.    Respiratory:  Negative for cough, chest tightness and shortness of breath.    Cardiovascular:  Negative for chest pain, palpitations and leg swelling.   Gastrointestinal:  Negative for abdominal distention, abdominal pain, blood in  "stool, change in bowel habit, constipation, diarrhea, nausea, vomiting and change in bowel habit.   Genitourinary:  Positive for frequency. Negative for dysuria and urgency.   Musculoskeletal:  Negative for arthralgias and back pain.   Integumentary:  Negative for rash and mole/lesion.   Hematological:  Negative for adenopathy.   Psychiatric/Behavioral:          Bipolar disorder - stable       PMHx:  CAD, HTN, hypercholesterolemia, arthritis, diabetes mellitus with neuropathy, bipolar disorder, BPH, cataracts, melanoma right back 2005, anemia  PSHx:  Right hemicolectomy 9/17, colonoscopy, tonsillectomy, melanoma resection right lower back, Mediport, excision left arm lesion  SH:  No cigarettes, + smokeless tobacco, quit 2014.  FH:  His father had multiple myeloma.       Objective:        BP (!) 161/54   Pulse 71   Temp 98.3 °F (36.8 °C)   Resp 16   Ht 5' 8" (1.727 m)   Wt 75.8 kg (167 lb)   SpO2 97%   BMI 25.39 kg/m²    Physical Exam  Constitutional:       Appearance: Normal appearance.   HENT:      Head: Normocephalic and atraumatic.      Nose: Nose normal.      Mouth/Throat:      Mouth: Mucous membranes are moist.      Pharynx: Oropharynx is clear. No posterior oropharyngeal erythema.   Eyes:      Extraocular Movements: Extraocular movements intact.      Conjunctiva/sclera: Conjunctivae normal.      Pupils: Pupils are equal, round, and reactive to light.   Cardiovascular:      Rate and Rhythm: Normal rate and regular rhythm.      Heart sounds: No murmur heard.  Pulmonary:      Breath sounds: Normal breath sounds.   Abdominal:      General: Bowel sounds are normal. There is no distension.      Palpations: Abdomen is soft.      Tenderness: There is no abdominal tenderness.      Comments: Well-healed laparoscopic incision sites.  No palpable masses or organomegaly.   Musculoskeletal:         General: No swelling or tenderness. Normal range of motion.      Cervical back: Normal range of motion and neck supple. " No tenderness.   Lymphadenopathy:      Cervical: No cervical adenopathy.   Skin:     General: Skin is warm and dry.      Findings: No lesion or rash.      Comments: MediPort removal incision right chest wall.   Neurological:      General: No focal deficit present.      Mental Status: He is alert and oriented to person, place, and time.      Cranial Nerves: No cranial nerve deficit.      Gait: Gait normal.       ECOG SCORE    0 - Fully active-able to carry on all pre-disease performance without restriction          LABORATORY  Recent Results (from the past 168 hour(s))   CEA (in-house)    Collection Time: 08/11/23  7:08 AM   Result Value Ref Range    Carcinoembryonic Antigen 2.37 0.00 - 3.00 ng/mL   Comprehensive Metabolic Panel    Collection Time: 08/11/23  7:08 AM   Result Value Ref Range    Sodium Level 141 136 - 145 mmol/L    Potassium Level 4.8 3.5 - 5.1 mmol/L    Chloride 107 98 - 107 mmol/L    Carbon Dioxide 27 23 - 31 mmol/L    Glucose Level 133 (H) 82 - 115 mg/dL    Blood Urea Nitrogen 18.6 8.4 - 25.7 mg/dL    Creatinine 1.03 0.73 - 1.18 mg/dL    Calcium Level Total 9.3 8.8 - 10.0 mg/dL    Protein Total 7.1 5.8 - 7.6 gm/dL    Albumin Level 4.0 3.4 - 4.8 g/dL    Globulin 3.1 2.4 - 3.5 gm/dL    Albumin/Globulin Ratio 1.3 1.1 - 2.0 ratio    Bilirubin Total 0.5 <=1.5 mg/dL    Alkaline Phosphatase 90 40 - 150 unit/L    Alanine Aminotransferase 14 0 - 55 unit/L    Aspartate Aminotransferase 13 5 - 34 unit/L    eGFR >60 mls/min/1.73/m2   CBC with Differential    Collection Time: 08/11/23  7:08 AM   Result Value Ref Range    WBC 6.46 4.50 - 11.50 x10(3)/mcL    RBC 4.84 4.70 - 6.10 x10(6)/mcL    Hgb 14.4 14.0 - 18.0 g/dL    Hct 46.2 42.0 - 52.0 %    MCV 95.5 (H) 80.0 - 94.0 fL    MCH 29.8 27.0 - 31.0 pg    MCHC 31.2 (L) 33.0 - 36.0 g/dL    RDW 13.0 11.5 - 17.0 %    Platelet 190 130 - 400 x10(3)/mcL    MPV 9.2 7.4 - 10.4 fL    Neut % 64.3 %    Lymph % 20.1 %    Mono % 9.9 %    Eos % 4.6 %    Basophil % 0.9 %    Lymph  # 1.30 0.6 - 4.6 x10(3)/mcL    Neut # 4.15 2.1 - 9.2 x10(3)/mcL    Mono # 0.64 0.1 - 1.3 x10(3)/mcL    Eos # 0.30 0 - 0.9 x10(3)/mcL    Baso # 0.06 <=0.2 x10(3)/mcL    IG# 0.01 0 - 0.04 x10(3)/mcL    IG% 0.2 %            Assessment:   Stage III colon cancer - REGINALD      Plan:   Patient is doing well with no suspicious findings on exam or laboratory.  Surveillance colonoscopy is not recommended secondary to his advanced age.  Urology is addressing the elevated PSA and urinary frequency.  Patient is requesting to be released to ongoing monitoring/surveillance through his PCP.  Follow-up here will be on an as needed basis.    All questions answered to the satisfaction of the patient.    ROMY CLANCY, FNP-C  [facility] [place]     Other Physicians  Dr. Luis Jorgensen ([facility])

## 2023-08-15 ENCOUNTER — OFFICE VISIT (OUTPATIENT)
Dept: HEMATOLOGY/ONCOLOGY | Facility: CLINIC | Age: 88
End: 2023-08-15
Payer: MEDICARE

## 2023-08-15 VITALS
RESPIRATION RATE: 16 BRPM | OXYGEN SATURATION: 97 % | BODY MASS INDEX: 25.31 KG/M2 | WEIGHT: 167 LBS | DIASTOLIC BLOOD PRESSURE: 54 MMHG | HEIGHT: 68 IN | SYSTOLIC BLOOD PRESSURE: 161 MMHG | HEART RATE: 71 BPM | TEMPERATURE: 98 F

## 2023-08-15 DIAGNOSIS — Z85.038 PERSONAL HISTORY OF COLON CANCER: Primary | ICD-10-CM

## 2023-08-15 PROCEDURE — 99999 PR PBB SHADOW E&M-EST. PATIENT-LVL IV: CPT | Mod: PBBFAC,,, | Performed by: NURSE PRACTITIONER

## 2023-08-15 PROCEDURE — 99213 PR OFFICE/OUTPT VISIT, EST, LEVL III, 20-29 MIN: ICD-10-PCS | Mod: S$PBB,,, | Performed by: NURSE PRACTITIONER

## 2023-08-15 PROCEDURE — 99214 OFFICE O/P EST MOD 30 MIN: CPT | Mod: PBBFAC | Performed by: NURSE PRACTITIONER

## 2023-08-15 PROCEDURE — 99213 OFFICE O/P EST LOW 20 MIN: CPT | Mod: S$PBB,,, | Performed by: NURSE PRACTITIONER

## 2023-08-15 PROCEDURE — 99999 PR PBB SHADOW E&M-EST. PATIENT-LVL IV: ICD-10-PCS | Mod: PBBFAC,,, | Performed by: NURSE PRACTITIONER

## 2023-10-19 ENCOUNTER — TELEPHONE (OUTPATIENT)
Dept: PRIMARY CARE CLINIC | Facility: CLINIC | Age: 88
End: 2023-10-19
Payer: MEDICARE

## 2023-10-19 NOTE — TELEPHONE ENCOUNTER
----- Message from Justin Coyne sent at 10/19/2023  3:42 PM CDT -----  .Type:  Needs Medical Advice    Who Called: Blaine  Symptoms (please be specific):    How long has patient had these symptoms:    Pharmacy name and phone #:    Would the patient rather a call back or a response via [facility]?   Best Call Back Number: [phone]  Additional Information: Requested a call back from the nurse re: his leg pain.   He has some medication that he wanted to take and needed to speak with the nurse.

## 2023-10-19 NOTE — TELEPHONE ENCOUNTER
I contacted patient regarding his message, he was questioning Tylenol Arthritis for his leg cramps.

## 2024-01-10 ENCOUNTER — DOCUMENTATION ONLY (OUTPATIENT)
Dept: PRIMARY CARE CLINIC | Facility: CLINIC | Age: 89
End: 2024-01-10

## 2024-01-22 ENCOUNTER — TELEPHONE (OUTPATIENT)
Dept: PRIMARY CARE CLINIC | Facility: CLINIC | Age: 89
End: 2024-01-22
Payer: MEDICARE

## 2024-01-22 DIAGNOSIS — I10 PRIMARY HYPERTENSION: Primary | ICD-10-CM

## 2024-01-22 RX ORDER — LOSARTAN POTASSIUM 100 MG/1
100 TABLET ORAL DAILY
Qty: 90 TABLET | Refills: 3 | Status: SHIPPED | OUTPATIENT
Start: 2024-01-22

## 2024-01-22 RX ORDER — AMLODIPINE BESYLATE 10 MG/1
10 TABLET ORAL DAILY
Qty: 90 TABLET | Refills: 3 | Status: SHIPPED | OUTPATIENT
Start: 2024-01-22

## 2024-01-22 NOTE — TELEPHONE ENCOUNTER
----- Message from Jian Llanes sent at 1/22/2024  8:21 AM CST -----  Regarding: refill  ..Type:  RX Refill Request    Who Called: sherie  Refill or New Rx:refill  RX Name and Strength:losartan (COZAAR) 100 MG  How is the patient currently taking it? (ex. 1XDay):1xday  Is this a 30 day or 90 day RX:90 day  Refill or New Rx:refill  RX Name and Strength:amLODIPine (NORVASC) 10 MG  How is the patient currently taking it? (ex. 1XDay):1xday  Is this a 30 day or 90 day RX:90 day  Preferred Pharmacy with phone number:[facility]  Local or Mail Order:local  Ordering Provider:  Would the patient rather a call back or a response via [facility]? tori  Best Call Back Number:[phone]  Additional Information:

## 2024-02-01 ENCOUNTER — OFFICE VISIT (OUTPATIENT)
Dept: PRIMARY CARE CLINIC | Facility: CLINIC | Age: 89
End: 2024-02-01
Payer: MEDICARE

## 2024-02-01 VITALS
BODY MASS INDEX: 25.37 KG/M2 | WEIGHT: 167.38 LBS | DIASTOLIC BLOOD PRESSURE: 70 MMHG | HEIGHT: 68 IN | HEART RATE: 76 BPM | SYSTOLIC BLOOD PRESSURE: 129 MMHG

## 2024-02-01 DIAGNOSIS — R97.20 ELEVATED PSA: Chronic | ICD-10-CM

## 2024-02-01 DIAGNOSIS — F31.32 BIPOLAR AFFECTIVE DISORDER, CURRENTLY DEPRESSED, MODERATE: Chronic | ICD-10-CM

## 2024-02-01 DIAGNOSIS — E78.5 DYSLIPIDEMIA: Chronic | ICD-10-CM

## 2024-02-01 DIAGNOSIS — Z00.00 WELLNESS EXAMINATION: ICD-10-CM

## 2024-02-01 DIAGNOSIS — N40.1 BENIGN PROSTATIC HYPERPLASIA WITH URINARY FREQUENCY: Chronic | ICD-10-CM

## 2024-02-01 DIAGNOSIS — I10 PRIMARY HYPERTENSION: Primary | Chronic | ICD-10-CM

## 2024-02-01 DIAGNOSIS — R73.03 PREDIABETES: Chronic | ICD-10-CM

## 2024-02-01 DIAGNOSIS — R35.0 BENIGN PROSTATIC HYPERPLASIA WITH URINARY FREQUENCY: Chronic | ICD-10-CM

## 2024-02-01 PROCEDURE — 99213 OFFICE O/P EST LOW 20 MIN: CPT | Mod: ,,, | Performed by: GENERAL PRACTICE

## 2024-02-01 RX ORDER — SERTRALINE HYDROCHLORIDE 25 MG/1
25 TABLET, FILM COATED ORAL DAILY
Qty: 90 TABLET | Refills: 3 | Status: SHIPPED | OUTPATIENT
Start: 2024-02-01 | End: 2025-01-31

## 2024-02-01 NOTE — PROGRESS NOTES
"Subjective:       Patient ID: Blaine Elmore is a 89 y.o. male.    Chief Complaint: Follow-up    Patient presents to the clinic today for chronic condition follow-up.  Doing well, no specific complaints, tolerating all medications, reporting no significant side effects or problems.    Last wellness exam was wellness was 07/06/2023.    Chronic conditions being treated include but are not limited to primary hypertension, dyslipidemia, prediabetes, bipolar disorder.      Review of Systems   Constitutional: Negative.  Negative for fatigue and fever.   HENT: Negative.  Negative for sore throat and trouble swallowing.    Eyes: Negative.  Negative for redness and visual disturbance.   Respiratory: Negative.  Negative for chest tightness and shortness of breath.    Cardiovascular: Negative.  Negative for chest pain.   Gastrointestinal: Negative.  Negative for abdominal pain, blood in stool and nausea.   Endocrine: Negative.  Negative for cold intolerance, heat intolerance and polyuria.   Genitourinary: Negative.    Musculoskeletal: Negative.  Negative for arthralgias, gait problem and joint swelling.   Integumentary:  Negative for rash. Negative.   Neurological: Negative.  Negative for dizziness, weakness and headaches.   Psychiatric/Behavioral: Negative.  Negative for agitation and dysphoric mood.            Patient Care Team:  Luis Johnson MD as PCP - General (Family Medicine)  Tone Gerber MD as Consulting Physician (Oncology)  Roger Arguelles MD as Consulting Physician (Urology)  Objective:   Visit Vitals  /70   Pulse 76   Ht 5' 8" (1.727 m)   Wt 75.9 kg (167 lb 6.4 oz)   BMI 25.45 kg/m²        Physical Exam  Constitutional:       Appearance: Normal appearance.   HENT:      Head: Normocephalic.      Mouth/Throat:      Mouth: Mucous membranes are moist.      Pharynx: Oropharynx is clear.   Eyes:      Conjunctiva/sclera: Conjunctivae normal.      Pupils: Pupils are equal, round, and reactive to light. "   Cardiovascular:      Rate and Rhythm: Normal rate and regular rhythm.      Heart sounds: Normal heart sounds.   Pulmonary:      Effort: Pulmonary effort is normal.      Breath sounds: Normal breath sounds.   Abdominal:      General: Abdomen is flat.      Palpations: Abdomen is soft.   Musculoskeletal:         General: Normal range of motion.      Cervical back: Neck supple.   Skin:     General: Skin is warm and dry.   Neurological:      General: No focal deficit present.      Mental Status: He is alert and oriented to person, place, and time.   Psychiatric:         Mood and Affect: Mood normal.         Behavior: Behavior normal.         Thought Content: Thought content normal.         Judgment: Judgment normal.           Lab Results   Component Value Date     08/11/2023    K 4.8 08/11/2023    CO2 27 08/11/2023    BUN 18.6 08/11/2023    CREATININE 1.03 08/11/2023    CALCIUM 9.3 08/11/2023    ALBUMIN 4.0 08/11/2023    BILITOT 0.5 08/11/2023    ALKPHOS 90 08/11/2023    AST 13 08/11/2023    ALT 14 08/11/2023    EGFRNORACEVR >60 08/11/2023     Lab Results   Component Value Date    WBC 6.46 08/11/2023    RBC 4.84 08/11/2023    HGB 14.4 08/11/2023    HCT 46.2 08/11/2023    MCV 95.5 (H) 08/11/2023    RDW 13.0 08/11/2023     08/11/2023      Lab Results   Component Value Date    CHOL 213 (H) 07/03/2023    TRIG 82 07/03/2023    HDL 56 07/03/2023    .00 (H) 07/03/2023    TOTALCHOLEST 4 07/03/2023     Lab Results   Component Value Date    TSH 1.014 07/03/2023     Lab Results   Component Value Date    HGBA1C 5.6 07/03/2023        Lab Results   Component Value Date    PSA 7.93 (H) 06/20/2022         Assessment:       ICD-10-CM ICD-9-CM   1. Primary hypertension  I10 401.9   2. Dyslipidemia  E78.5 272.4   3. Elevated PSA  R97.20 790.93   4. Benign prostatic hyperplasia with urinary frequency  N40.1 600.01    R35.0 788.41   5. Prediabetes  R73.03 790.29   6. Bipolar affective disorder, currently depressed,  moderate  F31.32 296.52   7. Wellness examination  Z00.00 V70.0       Current Outpatient Medications   Medication Instructions    amLODIPine (NORVASC) 10 mg, Oral, Daily    aspirin (ECOTRIN) 81 mg, Oral    finasteride (PROSCAR) 5 mg, Oral, Daily    losartan (COZAAR) 100 mg, Oral, Daily    melatonin (MELATIN) 3 mg, Oral    sertraline (ZOLOFT) 25 mg, Oral, Daily    tadalafiL (CIALIS) 5 mg, Oral    vit A/vit C/vit E/zinc/copper (ICAPS AREDS ORAL) Oral    vitamin B complex (SUPER B COMPLEX-B-12 ORAL) Oral     Plan:     Problem List Items Addressed This Visit          Psychiatric    Bipolar affective disorder, currently depressed, moderate (Chronic)    Overview     Prescribed sertraline 25 mg daily.    Patient reports stability of moods, and effectiveness of medications, including no agitation, significant somnolence, or significant bouts of anxiety or depression.  Patient is not having any sleep disturbance.  Current treatment plan will continue, with plans to discuss any significant changes in patient's status if necessary if anything changes in the future.         Relevant Medications    sertraline (ZOLOFT) 25 MG tablet       Cardiac/Vascular    Primary hypertension - Primary (Chronic)    Overview     Prescribed losartan 100 mg daily, amlodipine 10 mg daily.    Blood pressures appear to be adequately controlled with current treatment plan, including prescription blood pressure medication. Continue medical management and continue home blood pressure checks.  Blood pressure should be checked as discussed during medical visits, and average blood pressure should be no greater than 130/80.         Relevant Orders    Comprehensive Metabolic Panel    CBC Auto Differential    TSH    Dyslipidemia (Chronic)    Overview     Not prescribed any lipid-lowering medication.    Due to the patient's advanced age, prescribing statins or other lipid lowering medications is not clinically indicated.  Patient will continue to eat a healthy  diet, low in saturated fats, and high in fiber.         Current Assessment & Plan            Component Ref Range & Units 7 mo ago 1 yr ago 4 yr ago   Cholesterol Total <=200 mg/dL 213 High  229 High  202 High  R   HDL Cholesterol 35 - 60 mg/dL 56 66 High  61 High    Triglyceride 34 - 140 mg/dL 82 92 93 R   Cholesterol/HDL Ratio 0 - 5 4 3 3.3 R   Very Low Density Lipoprotein  16 18 19 R   LDL Cholesterol 50.00 - 140.00 mg/dL 141.00 High  145.00 High  122 R             Relevant Orders    Lipid Panel       Renal/    Elevated PSA (Chronic)    Overview     Patient sees Urology, Dr. Arguelles.         Benign prostatic hyperplasia with urinary frequency (Chronic)    Overview     Patient sees Urology, Dr. Arguelles.  Prescribed Proscar 5 mg daily.      This medical condition is currently stable on prescription medication, continue current treatment plan.            Endocrine    Prediabetes (Chronic)    Overview     Not prescribed any medication for prediabetes.         Relevant Orders    Hemoglobin A1C     Other Visit Diagnoses       Wellness examination        This diagnosis does not apply to this visit, wellness exam with pre visit labs will be scheduled/ordered for future visit.                    Follow up in about 1 year (around 2/1/2025) for Medicare Wellness.

## 2024-02-01 NOTE — ASSESSMENT & PLAN NOTE
Component Ref Range & Units 7 mo ago 1 yr ago 4 yr ago   Cholesterol Total <=200 mg/dL 213 High  229 High  202 High  R   HDL Cholesterol 35 - 60 mg/dL 56 66 High  61 High    Triglyceride 34 - 140 mg/dL 82 92 93 R   Cholesterol/HDL Ratio 0 - 5 4 3 3.3 R   Very Low Density Lipoprotein  16 18 19 R   LDL Cholesterol 50.00 - 140.00 mg/dL 141.00 High  145.00 High  122 R

## 2024-02-05 ENCOUNTER — TELEPHONE (OUTPATIENT)
Dept: PRIMARY CARE CLINIC | Facility: CLINIC | Age: 89
End: 2024-02-05
Payer: MEDICARE

## 2024-02-05 NOTE — TELEPHONE ENCOUNTER
Called and spoke to pt regarding message about wanting sleeping medication. Pt stated at his visit  stated he would call medication in for pt if needed due to pt taking OTC meds with no relief. Explained to pt that he would have to be seen due to use not having any dx code for insomnia.I told pt I would send messaeg to nurse. Will call pt back

## 2024-02-06 NOTE — PROGRESS NOTES
"Subjective:       Patient ID: Blaine Elmore is a 89 y.o. male.    Chief Complaint: Insomnia    Established patient, presents to the clinic to discuss treatment of insomnia.  He has had fairly persistent insomnia now for a proximally 6-8 months.  He is currently taking over-the-counter Benadryl.  He had taken melatonin in the past, it did cause some dreams.  They Benadryl is working fairly well but he would like to know if there is some prescription medication which might work better without the risk of significant side effects that is associated with Benadryl.      Review of Systems   Constitutional: Negative.  Negative for fatigue and fever.   HENT: Negative.  Negative for sore throat and trouble swallowing.    Eyes: Negative.  Negative for redness and visual disturbance.   Respiratory: Negative.  Negative for chest tightness and shortness of breath.    Cardiovascular: Negative.  Negative for chest pain.   Gastrointestinal: Negative.  Negative for abdominal pain, blood in stool and nausea.   Endocrine: Negative.  Negative for cold intolerance, heat intolerance and polyuria.   Genitourinary: Negative.    Musculoskeletal: Negative.  Negative for arthralgias, gait problem and joint swelling.   Integumentary:  Negative for rash. Negative.   Neurological: Negative.  Negative for dizziness, weakness and headaches.   Psychiatric/Behavioral:  Positive for sleep disturbance. Negative for agitation and dysphoric mood.            Patient Care Team:  Luis Johnson MD as PCP - General (Family Medicine)  Tone Gerber MD as Consulting Physician (Oncology)  Roger Arguelles MD as Consulting Physician (Urology)  Objective:     Vitals:    02/07/24 0953   BP: 130/76   Pulse: 77   Resp: 18   SpO2: 98%   Weight: 75.8 kg (167 lb)   Height: 5' 8" (1.727 m)   Body mass index is 25.39 kg/m².     Physical Exam  Constitutional:       Appearance: Normal appearance.   Eyes:      Conjunctiva/sclera: Conjunctivae normal. "   Cardiovascular:      Rate and Rhythm: Normal rate and regular rhythm.   Pulmonary:      Effort: Pulmonary effort is normal.      Breath sounds: Normal breath sounds.   Skin:     General: Skin is warm and dry.   Neurological:      Mental Status: He is alert.   Psychiatric:         Mood and Affect: Mood normal.         Behavior: Behavior normal.           Assessment:       ICD-10-CM ICD-9-CM   1. Primary insomnia  F51.01 307.42       Current Outpatient Medications   Medication Instructions    amLODIPine (NORVASC) 10 mg, Oral, Daily    aspirin (ECOTRIN) 81 mg, Oral    finasteride (PROSCAR) 5 mg, Oral, Daily    losartan (COZAAR) 100 mg, Oral, Daily    sertraline (ZOLOFT) 25 mg, Oral, Daily    tadalafiL (CIALIS) 5 mg, Oral    vit A/vit C/vit E/zinc/copper (ICAPS AREDS ORAL) Oral    vitamin B complex (SUPER B COMPLEX-B-12 ORAL) Oral    zolpidem (AMBIEN) 5 mg, Oral, Nightly PRN     Plan:     Problem List Items Addressed This Visit          Other    Primary insomnia - Primary (Chronic)    Overview     Prescribed Ambien 5 mg q.h.s. p.r.n. insomnia, will alternate that with melatonin.    Ambien started on 02/07/2024.         Relevant Medications    zolpidem (AMBIEN) 5 MG Tab               No follow-ups on file.

## 2024-02-07 ENCOUNTER — OFFICE VISIT (OUTPATIENT)
Dept: PRIMARY CARE CLINIC | Facility: CLINIC | Age: 89
End: 2024-02-07
Payer: MEDICARE

## 2024-02-07 VITALS
WEIGHT: 167 LBS | BODY MASS INDEX: 25.31 KG/M2 | DIASTOLIC BLOOD PRESSURE: 76 MMHG | HEART RATE: 77 BPM | RESPIRATION RATE: 18 BRPM | OXYGEN SATURATION: 98 % | SYSTOLIC BLOOD PRESSURE: 130 MMHG | HEIGHT: 68 IN

## 2024-02-07 DIAGNOSIS — F51.01 PRIMARY INSOMNIA: Primary | ICD-10-CM

## 2024-02-07 PROCEDURE — 99213 OFFICE O/P EST LOW 20 MIN: CPT | Mod: ,,, | Performed by: GENERAL PRACTICE

## 2024-02-07 RX ORDER — ZOLPIDEM TARTRATE 5 MG/1
5 TABLET ORAL NIGHTLY PRN
Qty: 30 TABLET | Refills: 5 | Status: SHIPPED | OUTPATIENT
Start: 2024-02-07 | End: 2024-08-05

## 2024-07-08 ENCOUNTER — TELEPHONE (OUTPATIENT)
Dept: PRIMARY CARE CLINIC | Facility: CLINIC | Age: 89
End: 2024-07-08

## 2024-07-08 NOTE — TELEPHONE ENCOUNTER
Pre Visit Call    Pt has visit on ....07/10  Did pt confirm appointment? no  Did pt answer or left vm on mobile number?vm box not set up  Wellness labs done? no

## 2024-07-09 ENCOUNTER — TELEPHONE (OUTPATIENT)
Dept: PRIMARY CARE CLINIC | Facility: CLINIC | Age: 89
End: 2024-07-09
Payer: MEDICARE

## 2024-07-09 NOTE — TELEPHONE ENCOUNTER
Pre Visit Call    Pt has visit on ....07/10  Did pt confirm appointment?yes  Did pt answer or left vm on mobile number?  Wellness labs done? no

## 2024-07-09 NOTE — PROGRESS NOTES
Patient ID: [id number]     Chief Complaint: Annual Exam      HPI:     Blaine Elmore is a 89 y.o. male here today for a Medicare Wellness.     A separate E/M code has been provided to evaluate a medical problem apart and separate from the preventative care visit that was addressed during the dedicated Medicare Wellness Exam.  Mr. Valladares has had a 17 lb weight loss in about five months.  Has been eating normally, not dieting, not trying to lose weight.  The only symptom that he reports is a slight cough.  He has a previous history of stage III colon cancer, treated successfully, discovered about six years ago, released from care about three years ago by his history.  Reports no rectal bleeding, abdominal pain, no nausea or vomiting, no significant change in bowel movements.  Does have elevated PSA levels, was seeing Dr. Arguelles for this, no longer seeing him.  He does not wish to check his PSA levels this time, and probably indefinitely.  His wellness labs are pending      -------------------------------------    Adenocarcinoma of cecum    Bipolar disorder, unspecified    Cataract of both eyes    Colon cancer    Depression    DJD (degenerative joint disease)    Essential (primary) hypertension    Malignant neoplasm of colon    Urinary frequency        Past Surgical History:   Procedure Laterality Date    CATARACT EXTRACTION Left     COLONOSCOPY  2017    EXCISION OF MELANOMA      right lower back    MEDIPORT INSERTION, SINGLE      RIGHT HEMICOLECTOMY  09/2017    TONSILLECTOMY         Review of patient's allergies indicates:   Allergen Reactions    Solifenacin      Other reaction(s): arrhythmia    Statins-hmg-coa reductase inhibitors Other (See Comments)     myalgias       Outpatient Medications Marked as Taking for the 7/10/24 encounter (Office Visit) with Luis Johnson MD   Medication Sig Dispense Refill    amLODIPine (NORVASC) 10 MG tablet Take 1 tablet (10 mg total) by mouth once daily. 90 tablet 3    aspirin  (ECOTRIN) 81 MG EC tablet Take 81 mg by mouth.      finasteride (PROSCAR) 5 mg tablet Take 5 mg by mouth once daily.      losartan (COZAAR) 100 MG tablet Take 1 tablet (100 mg total) by mouth once daily. 90 tablet 3    sertraline (ZOLOFT) 25 MG tablet Take 1 tablet (25 mg total) by mouth once daily. 90 tablet 3    tadalafiL (CIALIS) 5 MG tablet Take 5 mg by mouth.      vit A/vit C/vit E/zinc/copper (ICAPS AREDS ORAL) Take by mouth.      vitamin B complex (SUPER B COMPLEX-B-12 ORAL) Take by mouth.      zolpidem (AMBIEN) 5 MG Tab Take 1 tablet (5 mg total) by mouth nightly as needed (Insomnia). 30 tablet 5       Social History     Socioeconomic History    Marital status:    Tobacco Use    Smoking status: Never    Smokeless tobacco: Former     Types: Snuff   Substance and Sexual Activity    Alcohol use: Yes     Alcohol/week: 1.0 - 2.0 standard drink of alcohol     Types: 1 - 2 Cans of beer per week    Drug use: Never        Family History   Problem Relation Name Age of Onset    No Known Problems Mother      Multiple myeloma Father          Patient Care Team:  Luis Johnson MD as PCP - General (Family Medicine)  Tone Gerber MD as Consulting Physician (Oncology)  Roger Arguelles MD as Consulting Physician (Urology)     Opioid Screening: Patient medication list reviewed, patient is not taking prescription opioids. Patient is not using additional opioids than prescribed. Patient is at low risk of substance abuse based on this opioid use history.       Subjective:     Review of Systems   Constitutional: Negative.  Negative for malaise/fatigue and weight loss.   HENT: Negative.     Eyes: Negative.    Respiratory: Negative.  Negative for shortness of breath.    Cardiovascular: Negative.  Negative for chest pain.   Gastrointestinal: Negative.    Genitourinary: Negative.    Musculoskeletal: Negative.    Skin: Negative.    Neurological: Negative.  Negative for focal weakness, weakness and headaches.    Endo/Heme/Allergies: Negative.    Psychiatric/Behavioral: Negative.  Negative for depression and memory loss. The patient is not nervous/anxious.          Patient Reported Health Risk Assessment  What is your age?: 80 or older  Are you male or female?: Male  During the past four weeks, how much have you been bothered by emotional problems such as feeling anxious, depressed, irritable, sad, or downhearted and blue?: Not at all  During the past five weeks, has your physical and/or emotional health limited your social activities with family, friends, neighbors, or groups?: Not at all  During the past four weeks, how much bodily pain have you generally had?: No pain  During the past four weeks, was someone available to help if you needed and wanted help?: Yes, as much as I wanted  During the past four weeks, what was the hardest physical activity you could do for at least two minutes?: Light  Can you get to places out of walking distance without help?  (For example, can you travel alone on buses or taxis, or drive your own car?): Yes  Can you go shopping for groceries or clothes without someone's help?: Yes  Can you prepare your own meals?: Yes  Can you do your own housework without help?: Yes  Because of any health problems, do you need the help of another person with your personal care needs such as eating, bathing, dressing, or getting around the house?: No  Can you handle your own money without help?: Yes  During the past four weeks, how would you rate your health in general?: Good  How have things been going for you during the past four weeks?: Pretty well  Are you having difficulties driving your car?: No  Do you always fasten your seat belt when you are in a car?: Yes, usually  How often in the past four weeks have you been bothered by falling or dizzy when standing up?: Never  How often in the past four weeks have you been bothered by sexual problems?: Never  How often in the past four weeks have you been  "bothered by trouble eating well?: Never  How often in the past four weeks have you been bothered by teeth or denture problems?: Never  How often in the past four weeks have you been bothered with problems using the telephone?: Never  How often in the past four weeks have you been bothered by tiredness or fatigue?: Never  Have you fallen two or more times in the past year?: No  Are you afraid of falling?: No  Are you a smoker?: No  During the past four weeks, how many drinks of wine, beer, or other alcoholic beverages did you have?: One drink or less per week  Do you exercise for about 20 minutes three or more days a week?: No, I usually do not exercise this much  Have you been given any information to help you with hazards in your house that might hurt you?: No  Have you been given any information to help you with keeping track of your medications?: No  How often do you have trouble taking medicines the way you've been told to take them?: I always take them as prescribed  How confident are you that you can control and manage most of your health problems?: Very confident  What is your race? (Check all that apply.):     Objective:     BP (!) 147/67   Pulse (!) 50   Resp 18   Ht 5' 8" (1.727 m)   Wt 68 kg (150 lb)   SpO2 95%   BMI 22.81 kg/m²     Physical Exam  Constitutional:       Appearance: Normal appearance.   HENT:      Head: Normocephalic.      Mouth/Throat:      Mouth: Mucous membranes are moist.      Pharynx: Oropharynx is clear.   Eyes:      Conjunctiva/sclera: Conjunctivae normal.      Pupils: Pupils are equal, round, and reactive to light.   Cardiovascular:      Rate and Rhythm: Normal rate and regular rhythm.      Heart sounds: Normal heart sounds.   Pulmonary:      Effort: Pulmonary effort is normal.      Breath sounds: Normal breath sounds.   Abdominal:      General: Abdomen is flat.      Palpations: Abdomen is soft.   Musculoskeletal:         General: Normal range of motion.      Cervical " back: Neck supple.   Skin:     General: Skin is warm and dry.   Neurological:      General: No focal deficit present.      Mental Status: He is alert and oriented to person, place, and time.   Psychiatric:         Mood and Affect: Mood normal.         Behavior: Behavior normal.         Thought Content: Thought content normal.         Judgment: Judgment normal.         Lab Results   Component Value Date     08/11/2023    K 4.8 08/11/2023     08/11/2023    CO2 27 08/11/2023    BUN 18.6 08/11/2023    CREATININE 1.03 08/11/2023    CALCIUM 9.3 08/11/2023    ALBUMIN 4.0 08/11/2023    BILITOT 0.5 08/11/2023    ALKPHOS 90 08/11/2023    AST 13 08/11/2023    ALT 14 08/11/2023    EGFRNORACEVR >60 08/11/2023     Lab Results   Component Value Date    WBC 6.46 08/11/2023    RBC 4.84 08/11/2023    HGB 14.4 08/11/2023    HCT 46.2 08/11/2023    MCV 95.5 (H) 08/11/2023    RDW 13.0 08/11/2023     08/11/2023      Lab Results   Component Value Date    CHOL 213 (H) 07/03/2023    TRIG 82 07/03/2023    HDL 56 07/03/2023    .00 (H) 07/03/2023    TOTALCHOLEST 4 07/03/2023     Lab Results   Component Value Date    TSH 1.014 07/03/2023     Lab Results   Component Value Date    HGBA1C 5.6 07/03/2023        Lab Results   Component Value Date    PSA 7.93 (H) 06/20/2022              No data to display                  7/10/2024     8:00 AM 2/1/2024     2:00 PM 8/15/2023     9:00 AM 8/22/2022    11:20 AM 8/3/2022     8:15 AM 6/22/2022     9:45 AM   Fall Risk Assessment - Outpatient   Mobility Status Ambulatory Ambulatory Ambulatory Ambulatory Ambulatory Ambulatory   Number of falls 0 0 0 0 0 0   Identified as fall risk False False False False False False           Depression Screening  Over the past two weeks, has the patient felt down, depressed, or hopeless?: No  Over the past two weeks, has the patient felt little interest or pleasure in doing things?: No  Functional Ability/Safety Screening  Was the patient's timed Up  & Go test unsteady or longer than 30 seconds?: No  Does the patient need help with phone, transportation, shopping, preparing meals, housework, laundry, meds, or managing money?: No  Does the patient's home have rugs in the hallway, lack grab bars in the bathroom, lack handrails on the stairs or have poor lighting?: No  Have you noticed any hearing difficulties?: No  Cognitive Function (Assessed through direct observation with due consideration of information obtained by way of patient reports and/or concerns raised by family, friends, caretakers, or others)    Does the patient repeat questions/statements in the same day?: No  Does the patient have trouble remembering the date, year, and time?: No  Does the patient have difficulty managing finances?: No  Does the patient have a decreased sense of direction?: No  Assessment:       ICD-10-CM ICD-9-CM   1. Medicare annual wellness visit, subsequent  Z00.00 V70.0   2. Primary hypertension  I10 401.9   3. Dyslipidemia  E78.5 272.4   4. Bipolar affective disorder, currently depressed, moderate  F31.32 296.52   5. Elevated PSA  R97.20 790.93   6. Benign prostatic hyperplasia with urinary frequency  N40.1 600.01    R35.0 788.41   7. Prediabetes  R73.03 790.29   8. Primary insomnia  F51.01 307.42   9. Unintended weight loss  R63.4 783.21        Plan:     Medicare Annual Wellness and Personalized Prevention Plan:   Fall Risk + Home Safety + Hearing Impairment + Depression Screen + Cognitive Impairment Screen + Health Risk Assessment all reviewed.       Health Maintenance Topics with due status: Not Due       Topic Last Completion Date    Lipid Panel 07/03/2023    Influenza Vaccine 10/02/2023      The patient's Health Maintenance was reviewed and the following appears to be due at this time:   Health Maintenance Due   Topic Date Due    Hemoglobin A1c (Prediabetes)  07/03/2024     Health risk assessment:  After review of the patient's medical record as it relates to health risk  assessment over the next 5-10 years per recommendations of the [place]PSTF, it was determined that all pertinent recommendations have been appropriately addressed. The patient does not desire any further preventative care measures or screening tests at this time.  We will continue to reassess at each annual visit.    1. Medicare annual wellness visit, subsequent  Comments:  Overall health status was reviewed.  Good health habits reinforced.  Annual wellness exams recommended.    2. Primary hypertension  Overview:  Prescribed losartan 100 mg daily, amlodipine 10 mg daily.    Blood pressures appear to be adequately controlled with current treatment plan, including prescription blood pressure medication.  Medication(s) appear to be effective at current dosages, and are not causing any side effects or problems.  Continue medical management and continue home blood pressure checks.  Average blood pressures at home should be no greater than 130/80.  Patient instructed to contact the clinic if blood pressures become elevated at any point prior to next clinic visit.    Medication will be continued at the current dosage.      3. Dyslipidemia  Overview:  Dyslipidemia is being treated using lifestyle modification only.  Patient is not being prescribed lipid-lowering medication.  As discussed, we will continue to monitor this, and may ultimately choose to prescribe medication if this becomes difficult to control utilizing lifestyle modification only.      4. Bipolar affective disorder, currently depressed, moderate  Overview:  Prescribed sertraline 25 mg daily.    Patient reports stability of moods, and effectiveness of medications, including no agitation, significant somnolence, or significant bouts of anxiety or depression.  Patient is not having any sleep disturbance.  Current treatment plan will continue, with plans to discuss any significant changes in patient's status if necessary if anything changes in the future.      5.  Elevated PSA  Overview:  Patient sees Urology, Dr. Arguelles.      6. Benign prostatic hyperplasia with urinary frequency  Overview:  Patient sees Urology, Dr. Arguelles.  Prescribed Proscar 5 mg daily.      This medical condition is currently stable on prescription medication, continue current treatment plan.      7. Prediabetes  Overview:  Patient has prediabetes and is not prescribed medication.  Patient's prediabetes is treated and controlled adequately using conservative means, specifically lifestyle modification, dietary changes, and/or increase in activity/exercise.    Assessment & Plan:         Component Ref Range & Units 1 yr ago 2 yr ago 5 yr ago   Hemoglobin A1c <=7.0 % 5.6 5.5 6.4 High  R   Estimated Average Glucose mg/dL 114.0 111.2 137        Current A1c pending.      8. Primary insomnia  Overview:  Prescribed Ambien 5 mg q.h.s. p.r.n. insomnia, will alternate that with melatonin.    Ambien started on 02/07/2024.      9. Unintended weight loss  Overview:  We will begin an evaluation for unintended weight loss.  At the patient's wishes, this will be done any measured way, with blood work today, and a chest x-ray.  We will discuss the results when available, and decide whether he wants to pursue further workup at this time.    Orders:  -     X-Ray Chest PA And Lateral; Future; Expected date: 07/10/2024            Advance Care Planning     Date: 07/09/2024    Living Will  During this visit, I engaged the patient  in the voluntary advance care planning process.  The patient and I reviewed the role for advance directives and their purpose in directing future healthcare if the patient's unable to speak for him/herself.  At this point in time, the patient does have full decision-making capacity.  We discussed different extreme health states that he could experience, and reviewed what kind of medical care he would want in those situations.  The patient communicated that if he were comatose and had little  chance of a meaningful recovery, he would not want machines/life-sustaining treatments used. In addition to the above preference, other important end-of-life issues for the patient include no other issues.  Advanced care planning paperwork given to patient to bring home and discuss with the family.  Once signed, patient should bring form back for for the purposes of entering it into the medical record.  I spent a total of 5 minutes engaging the patient in this advance care planning discussion.              Current Outpatient Medications   Medication Instructions    amLODIPine (NORVASC) 10 mg, Oral, Daily    aspirin (ECOTRIN) 81 mg, Oral    finasteride (PROSCAR) 5 mg, Oral, Daily    losartan (COZAAR) 100 mg, Oral, Daily    sertraline (ZOLOFT) 25 mg, Oral, Daily    tadalafiL (CIALIS) 5 mg, Oral    vit A/vit C/vit E/zinc/copper (ICAPS AREDS ORAL) Oral    vitamin B complex (SUPER B COMPLEX-B-12 ORAL) Oral    zolpidem (AMBIEN) 5 mg, Oral, Nightly PRN        Follow up in about 6 months (around 1/13/2025) for Extended chronic condition F/up. In addition to their scheduled follow up, the patient has also been instructed to follow up on as needed basis.

## 2024-07-09 NOTE — ASSESSMENT & PLAN NOTE
Component Ref Range & Units 1 yr ago 2 yr ago 5 yr ago   Hemoglobin A1c <=7.0 % 5.6 5.5 6.4 High  R   Estimated Average Glucose mg/dL 114.0 111.2 137        Current A1c pending.

## 2024-07-10 ENCOUNTER — OFFICE VISIT (OUTPATIENT)
Dept: PRIMARY CARE CLINIC | Facility: CLINIC | Age: 89
End: 2024-07-10
Payer: MEDICARE

## 2024-07-10 VITALS
WEIGHT: 150 LBS | HEIGHT: 68 IN | RESPIRATION RATE: 18 BRPM | OXYGEN SATURATION: 95 % | HEART RATE: 50 BPM | DIASTOLIC BLOOD PRESSURE: 67 MMHG | BODY MASS INDEX: 22.73 KG/M2 | SYSTOLIC BLOOD PRESSURE: 147 MMHG

## 2024-07-10 DIAGNOSIS — R97.20 ELEVATED PSA: Chronic | ICD-10-CM

## 2024-07-10 DIAGNOSIS — F51.01 PRIMARY INSOMNIA: Chronic | ICD-10-CM

## 2024-07-10 DIAGNOSIS — I10 PRIMARY HYPERTENSION: ICD-10-CM

## 2024-07-10 DIAGNOSIS — R73.03 PREDIABETES: Chronic | ICD-10-CM

## 2024-07-10 DIAGNOSIS — F31.32 BIPOLAR AFFECTIVE DISORDER, CURRENTLY DEPRESSED, MODERATE: Chronic | ICD-10-CM

## 2024-07-10 DIAGNOSIS — N40.1 BENIGN PROSTATIC HYPERPLASIA WITH URINARY FREQUENCY: Chronic | ICD-10-CM

## 2024-07-10 DIAGNOSIS — Z00.00 MEDICARE ANNUAL WELLNESS VISIT, SUBSEQUENT: Primary | ICD-10-CM

## 2024-07-10 DIAGNOSIS — E78.5 DYSLIPIDEMIA: Chronic | ICD-10-CM

## 2024-07-10 DIAGNOSIS — R63.4 UNINTENDED WEIGHT LOSS: ICD-10-CM

## 2024-07-10 DIAGNOSIS — R35.0 BENIGN PROSTATIC HYPERPLASIA WITH URINARY FREQUENCY: Chronic | ICD-10-CM

## 2024-07-10 LAB
ALBUMIN SERPL-MCNC: 3.8 G/DL (ref 3.4–4.8)
ALBUMIN/GLOB SERPL: 1.2 RATIO (ref 1.1–2)
ALP SERPL-CCNC: 98 UNIT/L (ref 40–150)
ALT SERPL-CCNC: 10 UNIT/L (ref 0–55)
ANION GAP SERPL CALC-SCNC: 11 MEQ/L
AST SERPL-CCNC: 12 UNIT/L (ref 5–34)
BASOPHILS # BLD AUTO: 0.09 X10(3)/MCL
BASOPHILS NFR BLD AUTO: 1.4 %
BILIRUB SERPL-MCNC: 0.4 MG/DL
BUN SERPL-MCNC: 16.5 MG/DL (ref 8.4–25.7)
CALCIUM SERPL-MCNC: 9.6 MG/DL (ref 8.8–10)
CHLORIDE SERPL-SCNC: 106 MMOL/L (ref 98–111)
CHOLEST SERPL-MCNC: 216 MG/DL
CHOLEST/HDLC SERPL: 4 {RATIO} (ref 0–5)
CO2 SERPL-SCNC: 21 MMOL/L (ref 23–31)
CREAT SERPL-MCNC: 1.12 MG/DL (ref 0.73–1.18)
CREAT/UREA NIT SERPL: 15
EOSINOPHIL # BLD AUTO: 0.31 X10(3)/MCL (ref 0–0.9)
EOSINOPHIL NFR BLD AUTO: 4.7 %
ERYTHROCYTE [DISTWIDTH] IN BLOOD BY AUTOMATED COUNT: 14 % (ref 11.5–17)
EST. AVERAGE GLUCOSE BLD GHB EST-MCNC: 114 MG/DL
GFR SERPLBLD CREATININE-BSD FMLA CKD-EPI: >60 ML/MIN/1.73/M2
GLOBULIN SER-MCNC: 3.2 GM/DL (ref 2.4–3.5)
GLUCOSE SERPL-MCNC: 144 MG/DL (ref 75–121)
HBA1C MFR BLD: 5.6 %
HCT VFR BLD AUTO: 46.3 % (ref 42–52)
HDLC SERPL-MCNC: 60 MG/DL (ref 35–60)
HGB BLD-MCNC: 15.3 G/DL (ref 14–18)
IMM GRANULOCYTES # BLD AUTO: 0.01 X10(3)/MCL (ref 0–0.04)
IMM GRANULOCYTES NFR BLD AUTO: 0.2 %
LDLC SERPL CALC-MCNC: 136 MG/DL (ref 50–140)
LYMPHOCYTES # BLD AUTO: 1.2 X10(3)/MCL (ref 0.6–4.6)
LYMPHOCYTES NFR BLD AUTO: 18.3 %
MCH RBC QN AUTO: 30.3 PG (ref 27–31)
MCHC RBC AUTO-ENTMCNC: 33 G/DL (ref 33–36)
MCV RBC AUTO: 91.7 FL (ref 80–94)
MONOCYTES # BLD AUTO: 0.66 X10(3)/MCL (ref 0.1–1.3)
MONOCYTES NFR BLD AUTO: 10.1 %
NEUTROPHILS # BLD AUTO: 4.27 X10(3)/MCL (ref 2.1–9.2)
NEUTROPHILS NFR BLD AUTO: 65.3 %
NRBC BLD AUTO-RTO: 0 %
PLATELET # BLD AUTO: 214 X10(3)/MCL (ref 130–400)
PMV BLD AUTO: 10.1 FL (ref 7.4–10.4)
POTASSIUM SERPL-SCNC: 4.6 MMOL/L (ref 3.5–5.1)
PROT SERPL-MCNC: 7 GM/DL (ref 5.8–7.6)
RBC # BLD AUTO: 5.05 X10(6)/MCL (ref 4.7–6.1)
SODIUM SERPL-SCNC: 138 MMOL/L (ref 136–145)
TRIGL SERPL-MCNC: 102 MG/DL (ref 34–140)
TSH SERPL-ACNC: 0.13 UIU/ML (ref 0.35–4.94)
VLDLC SERPL CALC-MCNC: 20 MG/DL
WBC # BLD AUTO: 6.54 X10(3)/MCL (ref 4.5–11.5)

## 2024-07-10 PROCEDURE — 83036 HEMOGLOBIN GLYCOSYLATED A1C: CPT | Performed by: GENERAL PRACTICE

## 2024-07-10 PROCEDURE — 84443 ASSAY THYROID STIM HORMONE: CPT | Performed by: GENERAL PRACTICE

## 2024-07-10 PROCEDURE — 99213 OFFICE O/P EST LOW 20 MIN: CPT | Mod: 25,,, | Performed by: GENERAL PRACTICE

## 2024-07-10 PROCEDURE — 85025 COMPLETE CBC W/AUTO DIFF WBC: CPT | Performed by: GENERAL PRACTICE

## 2024-07-10 PROCEDURE — 80061 LIPID PANEL: CPT | Performed by: GENERAL PRACTICE

## 2024-07-10 PROCEDURE — G0439 PPPS, SUBSEQ VISIT: HCPCS | Mod: ,,, | Performed by: GENERAL PRACTICE

## 2024-07-10 PROCEDURE — 80053 COMPREHEN METABOLIC PANEL: CPT | Performed by: GENERAL PRACTICE

## 2024-07-10 PROCEDURE — 36415 COLL VENOUS BLD VENIPUNCTURE: CPT | Mod: ,,, | Performed by: GENERAL PRACTICE

## 2024-07-10 PROCEDURE — 36415 COLL VENOUS BLD VENIPUNCTURE: CPT | Performed by: GENERAL PRACTICE

## 2024-07-16 ENCOUNTER — OFFICE VISIT (OUTPATIENT)
Dept: PRIMARY CARE CLINIC | Facility: CLINIC | Age: 89
End: 2024-07-16
Payer: MEDICARE

## 2024-07-16 ENCOUNTER — TELEPHONE (OUTPATIENT)
Dept: PRIMARY CARE CLINIC | Facility: CLINIC | Age: 89
End: 2024-07-16

## 2024-07-16 ENCOUNTER — TELEPHONE (OUTPATIENT)
Dept: HEMATOLOGY/ONCOLOGY | Facility: CLINIC | Age: 89
End: 2024-07-16
Payer: MEDICARE

## 2024-07-16 VITALS
WEIGHT: 151 LBS | BODY MASS INDEX: 22.88 KG/M2 | HEIGHT: 68 IN | HEART RATE: 50 BPM | SYSTOLIC BLOOD PRESSURE: 149 MMHG | RESPIRATION RATE: 18 BRPM | DIASTOLIC BLOOD PRESSURE: 60 MMHG | OXYGEN SATURATION: 96 %

## 2024-07-16 DIAGNOSIS — R63.4 UNEXPLAINED WEIGHT LOSS: Primary | ICD-10-CM

## 2024-07-16 DIAGNOSIS — R79.89 DECREASED THYROID STIMULATING HORMONE (TSH) LEVEL: Chronic | ICD-10-CM

## 2024-07-16 DIAGNOSIS — R93.89 ABNORMAL CHEST X-RAY: ICD-10-CM

## 2024-07-16 DIAGNOSIS — R91.1 SOLITARY PULMONARY NODULE: Primary | ICD-10-CM

## 2024-07-16 PROBLEM — E05.90 HYPERTHYROIDISM: Status: ACTIVE | Noted: 2024-07-16

## 2024-07-16 LAB
T3FREE SERPL-MCNC: 2.7 PG/ML (ref 1.58–3.91)
T4 FREE SERPL-MCNC: 1.15 NG/DL (ref 0.7–1.48)
TSH SERPL-ACNC: 0.13 UIU/ML (ref 0.35–4.94)

## 2024-07-16 PROCEDURE — 84481 FREE ASSAY (FT-3): CPT | Performed by: GENERAL PRACTICE

## 2024-07-16 PROCEDURE — 84439 ASSAY OF FREE THYROXINE: CPT | Performed by: GENERAL PRACTICE

## 2024-07-16 PROCEDURE — 99214 OFFICE O/P EST MOD 30 MIN: CPT | Mod: ,,, | Performed by: GENERAL PRACTICE

## 2024-07-16 PROCEDURE — 84443 ASSAY THYROID STIM HORMONE: CPT | Performed by: GENERAL PRACTICE

## 2024-07-16 PROCEDURE — 36415 COLL VENOUS BLD VENIPUNCTURE: CPT | Performed by: GENERAL PRACTICE

## 2024-07-16 NOTE — TELEPHONE ENCOUNTER
Patient called to update us on his current condition. He was seen by Dr. Johnson today and got results from a CXR that was abnormal. He reports weakness, fatigue, weight loss of 17 pounds in the past 6 months. I reviewed his chart with Dr. Gerber and advised patient he needs to go ahead and have the CT chest to better evaluate the chest abnormality and he was agreeable. I got him set up to have the scan at [facility] on Thursday at 9AM and he will come in to see Dr. Gerber on Monday 7/22/2024 ay 2PM to discuss the results.

## 2024-07-16 NOTE — PROGRESS NOTES
"Subjective:       Patient ID: Blaine Elmore is a 89 y.o. male.    Chief Complaint: Results    Patient returns for continued evaluation of unexplained unintended weight loss, 15 lb in five months.  Blood testing did show a decreased TSH level, and chest x-ray did show a possible abnormality in the upper mediastinal region.  Patient does not have any symptoms that might suggest hyperthyroidism, no tachycardia, no heat intolerance, no diarrhea, no palpitations, no jitteriness.    He does feel that he is too weak to do the CT scan at this time, he would like to begin with a thyroid evaluation.      Review of Systems   Constitutional:  Positive for unexpected weight change (Weight loss). Negative for fatigue and fever.   HENT: Negative.  Negative for sore throat and trouble swallowing.    Eyes: Negative.  Negative for redness and visual disturbance.   Respiratory: Negative.  Negative for chest tightness and shortness of breath.    Cardiovascular: Negative.  Negative for chest pain.   Gastrointestinal: Negative.  Negative for abdominal pain, blood in stool and nausea.   Endocrine: Negative.  Negative for cold intolerance, heat intolerance and polyuria.   Genitourinary: Negative.    Musculoskeletal: Negative.  Negative for arthralgias, gait problem and joint swelling.   Integumentary:  Negative for rash. Negative.   Neurological: Negative.  Negative for dizziness, weakness and headaches.   Psychiatric/Behavioral: Negative.  Negative for agitation and dysphoric mood.            Patient Care Team:  Luis Johnson MD as PCP - General (Family Medicine)  Tone Gerber MD as Consulting Physician (Oncology)  Roger Arguelles MD as Consulting Physician (Urology)  Objective:   Visit Vitals  BP (!) 149/60   Pulse (!) 50   Resp 18   Ht 5' 8" (1.727 m)   Wt 68.5 kg (151 lb)   SpO2 96%   BMI 22.96 kg/m²        Physical Exam  Constitutional:       Appearance: Normal appearance.   HENT:      Head: Normocephalic.      " Mouth/Throat:      Mouth: Mucous membranes are moist.      Pharynx: Oropharynx is clear.   Eyes:      Conjunctiva/sclera: Conjunctivae normal.      Pupils: Pupils are equal, round, and reactive to light.   Neck:      Comments: Thyroid gland normal, not enlarged, no nodules  Cardiovascular:      Rate and Rhythm: Normal rate and regular rhythm.      Heart sounds: Normal heart sounds.   Pulmonary:      Effort: Pulmonary effort is normal.      Breath sounds: Normal breath sounds.   Abdominal:      General: Abdomen is flat.      Palpations: Abdomen is soft.   Musculoskeletal:         General: Normal range of motion.      Cervical back: Neck supple.   Skin:     General: Skin is warm and dry.   Neurological:      General: No focal deficit present.      Mental Status: He is alert and oriented to person, place, and time.   Psychiatric:         Mood and Affect: Mood normal.         Behavior: Behavior normal.         Thought Content: Thought content normal.         Judgment: Judgment normal.           Lab Results   Component Value Date     07/10/2024    K 4.6 07/10/2024     07/10/2024    CO2 21 (L) 07/10/2024    BUN 16.5 07/10/2024    CREATININE 1.12 07/10/2024    CALCIUM 9.6 07/10/2024    ALBUMIN 3.8 07/10/2024    BILITOT 0.4 07/10/2024    ALKPHOS 98 07/10/2024    AST 12 07/10/2024    ALT 10 07/10/2024    EGFRNORACEVR >60 07/10/2024     Lab Results   Component Value Date    WBC 6.54 07/10/2024    RBC 5.05 07/10/2024    HGB 15.3 07/10/2024    HCT 46.3 07/10/2024    MCV 91.7 07/10/2024    RDW 14.0 07/10/2024     07/10/2024      Lab Results   Component Value Date    CHOL 216 (H) 07/10/2024    TRIG 102 07/10/2024    HDL 60 07/10/2024    .00 07/10/2024    TOTALCHOLEST 4 07/10/2024     Lab Results   Component Value Date    TSH 0.127 (L) 07/10/2024     Lab Results   Component Value Date    HGBA1C 5.6 07/10/2024        Lab Results   Component Value Date    PSA 7.93 (H) 06/20/2022         Assessment:        ICD-10-CM ICD-9-CM   1. Unexplained weight loss  R63.4 783.21   2. Abnormal chest x-ray  R93.89 793.2   3. Decreased thyroid stimulating hormone (TSH) level  R79.89 794.5       Current Outpatient Medications   Medication Instructions    amLODIPine (NORVASC) 10 mg, Oral, Daily    aspirin (ECOTRIN) 81 mg, Oral    finasteride (PROSCAR) 5 mg, Oral, Daily    losartan (COZAAR) 100 mg, Oral, Daily    sertraline (ZOLOFT) 25 mg, Oral, Daily    tadalafiL (CIALIS) 5 mg, Oral    vit A/vit C/vit E/zinc/copper (ICAPS AREDS ORAL) Oral    vitamin B complex (SUPER B COMPLEX-B-12 ORAL) Oral    zolpidem (AMBIEN) 5 mg, Oral, Nightly PRN     Plan:     Problem List Items Addressed This Visit          Pulmonary    Abnormal chest x-ray    Overview     In light of his unintended weight loss, I would like to get a CT scan of his chest.  As of 07/16/2024 he feels like he is too weak to do the test, he would like to evaluate the thyroid testing first, and possibly wait to get the CT scan at some point next week or possibly a bit later.            Endocrine    Unexplained weight loss - Primary (Chronic)    Overview     We will begin an evaluation for unintended weight loss.    TSH was decreased, we will continue this investigation.         Current Assessment & Plan     TSH, free T3, free T4 levels on 07/16/2024.         Relevant Orders    TSH    T4, Free    T3, Free ([facility])    Decreased thyroid stimulating hormone (TSH) level    Overview     Begin an investigation of repeat TSH, free T3, free T4 on 07/16/2024.                    Follow up for further evaluation will be scheduled based on further symptoms, and/or abnormal test results..

## 2024-07-16 NOTE — TELEPHONE ENCOUNTER
----- Message from Luis Johnson MD sent at 7/15/2024 12:59 PM CDT -----  Please contact patient in the morning on 07/16/2024 and schedule a follow-up appointment to discuss his lab results.  ----- Message -----  From: Interface, Rad Results In  Sent: 7/11/2024  10:08 AM CDT  To: Luis Johnson MD

## 2024-07-21 NOTE — PROGRESS NOTES
Subjective:       Patient ID: Blaine Elmore is a 89 y.o. male.    Chief Complaint:  Weight loss and coughing    Diagnosis:  Lymphadenopathy                     Stage IIIA colon cancer 9/17 (T2 N1b M0)                     Iron deficiency anemia - resolved    Treatment History  Adjuvant Xeloda x 6 months (completed 5/18)    Current Treatment:  Follow-up for abnormal imaging results    Clinical History:  Patient initially presented with painless rectal bleeding on Plavix. Colonoscopy 9/11/17 showed a malignant-appearing mass in the cecum. Biopsy was positive for moderately differentiated adenocarcinoma. Preoperative CEA level was 3.5 ng/mL. CT scans of the chest, abdomen and pelvis 9/12/17 showed severe hepatic steatosis and hypodense lesions compatible with cysts. There was a 1.9 cm lytic appearing lesion in the right ischium and 3-4 mm bilateral pulmonary nodules. Bone scan 9/18/17 showed uptake in the proximal left tibia but no abnormal activity in the pelvis. Plain films showed no significant abnormalities. He underwent a right hemicolectomy 9/22/17. Final pathology revealed a 3.7 cm moderately differentiated adenocarcinoma extending into but not through the muscularis propria. Resection margins were clear. 2 out of 24 pericolonic lymph nodes were positive. He presented to [facility] for a treatment opinion. He was staged as IIIA. Recommendations were for adjuvant treatment with CAPOX for 3 months versus oral Xeloda for 6 months. He remained weak postoperatively, and decided he did not want to consider infusional chemotherapy.    [facility] to establish local Oncology care.  Treatment options were reviewed and discussed again.  He opted for therapy with oral Xeloda for 6 months as tolerated. Laboratory showed evidence of iron deficiency anemia which was treated with IV Injectafer x 2 doses with recovery of his blood counts.  He initiated Xeloda 11/27/17 and completed 6 months of  therapy 5/18 without significant side effects. Follow-up CT scans of the chest, abdomen and pelvis 5/30/18 showed stable hypoattenuating liver lesions bilaterally, largest on the right measured 14 mm. There were stable on an outside CT scan and felt to represent cysts. There were also small, nonspecific pulmonary nodules, none measuring more than 4 mm that were also stable compared to his prior films. Follow-up CT scans 9/27/18 showed similar findings. There was also a stable lytic lesion in the right ischial tuberosity and right L1 pedicle and a mixed lytic/sclerotic focus in the right fourth rib. He had no associated symptoms. CT PET scan 2/5/19 showed unchanged small punctate pulmonary nodules with no significant hypermetabolic activity. 14 mm right hepatic lobe hypodensity showed no significant glucose uptake. The lucent lesions described in the right ilium, L1 and right fourth rib showed no glucose uptake. He has had no evidence of disease recurrence on follow-up and additional surveillance imaging.      Interval History  He presents to the office today accompanied by his son due to recent abnormal imaging. He was last seen for a surveillance visit regarding his colon cancer 8/15/23.  He presented to his PCP 7/11/24 complaining of 15 lb weight loss over the past several weeks, weakness and a nonproductive cough.  He denied any fever, chills or night sweats.  Chest x-ray 7/11/24 showed a small right pleural effusion and widened mediastinum.  CT chest 7/18/24 showed bulky confluent mediastinal adenopathy and a left axillary node measuring 1.7 x 1.1 cm.  There was partially imaged soniya mass in the abdomen encasing the superior mesenteric artery.      Review of Systems   Constitutional:  Positive for fatigue and unexpected weight change. Negative for appetite change and fever.   HENT:  Negative for mouth sores, sore throat and trouble swallowing.    Eyes: Negative.    Respiratory:  Negative for cough and shortness  "of breath.    Cardiovascular:  Negative for chest pain, palpitations and leg swelling.   Gastrointestinal:  Negative for abdominal distention, abdominal pain, constipation, diarrhea and nausea.   Genitourinary:  Positive for frequency. Negative for dysuria, flank pain, hematuria and urgency.   Musculoskeletal:  Negative for arthralgias and back pain.   Integumentary:  Negative for pallor and rash.   Neurological:  Positive for weakness (Generalized, nonfocal). Negative for dizziness, numbness and headaches.   Hematological:  Negative for adenopathy. Does not bruise/bleed easily.   Psychiatric/Behavioral:  Negative for confusion. The patient is not nervous/anxious.         Bipolar disorder - stable       PMHx:  CAD, HTN, hypercholesterolemia, arthritis, diabetes mellitus with neuropathy, bipolar disorder, BPH, cataracts, melanoma right back 2005, anemia  PSHx:  Right hemicolectomy 9/17, colonoscopy, tonsillectomy, melanoma resection right lower back, Mediport, excision left arm lesion  SH:  No cigarettes, + smokeless tobacco, quit 2014.  FH:  His father had multiple myeloma.     Objective:        BP (!) 147/73   Pulse (!) 47   Resp 15   Ht 5' 8" (1.727 m)   Wt 68.7 kg (151 lb 8 oz)   SpO2 97%   BMI 23.04 kg/m²    Physical Exam  Constitutional:       Comments: Elderly white male in [facility]   HENT:      Head: Normocephalic.      Mouth/Throat:      Mouth: Mucous membranes are moist.      Pharynx: Oropharynx is clear. No posterior oropharyngeal erythema.   Eyes:      General: No scleral icterus.     Extraocular Movements: Extraocular movements intact.      Pupils: Pupils are equal, round, and reactive to light.   Cardiovascular:      Rate and Rhythm: Normal rate and regular rhythm.      Heart sounds: No murmur heard.  Pulmonary:      Comments: Lungs clear to auscultation  Abdominal:      General: Bowel sounds are normal. There is no distension.      Palpations: Abdomen is soft.      Tenderness: There is no abdominal " tenderness.      Comments: Well-healed laparoscopic incision sites.  Palpable fullness midabdomen without discrete mass.  No splenomegaly.   Musculoskeletal:         General: No swelling or tenderness. Normal range of motion.      Cervical back: Neck supple. No tenderness.   Lymphadenopathy:      Comments: No palpable peripheral lymphadenopathy.   Skin:     General: Skin is warm and dry.      Findings: No rash.      Comments: MediPort removal incision right chest wall.   Neurological:      General: No focal deficit present.      Mental Status: He is alert and oriented to person, place, and time.      Cranial Nerves: No cranial nerve deficit.      Motor: No weakness.       ECOG SCORE    2 - Capable of all selfcare but unable to carry out any work activities, active > 50% of hours          LABORATORY  Recent Results (from the past 168 hour(s))   T3, Free ([facility])    Collection Time: 07/16/24  9:25 AM   Result Value Ref Range    T3 Free 2.70 1.58 - 3.91 pg/mL   T4, Free    Collection Time: 07/16/24  9:25 AM   Result Value Ref Range    Thyroxine Free 1.15 0.70 - 1.48 ng/dL   TSH    Collection Time: 07/16/24  9:25 AM   Result Value Ref Range    TSH 0.130 (L) 0.350 - 4.940 uIU/mL            Assessment:   Lymphadenopathy  Stage III colon cancer - REGINALD      Plan:   CT images were reviewed in the office today in the presence of the patient and his son.  Findings suspicious for lymphoma.  Positive weight loss, but no other B symptoms.  Laboratory testing from 7/10/24 showed an essentially normal CMP and CBC.  Discussed that treatment options would be determined by final pathology.  He understands he would not be a candidate for aggressive therapy due to his advanced age, nor would he want to pursue aggressive treatment, but he would like to know what his options are.  Consult Surgical Oncology to determine if safe to biopsy the left axillary lymph node.    If he is not an appropriate surgical candidate, could consider  bronchoscopy and EBUS, but could potentially be suboptimal for diagnosis.  Schedule CT-PET scan for more complete staging.  RTC after the above to discuss treatment options.      SAMY NDIAYE MD    Other Physicians  Dr. Luis Jorgensen ([facility])

## 2024-07-22 ENCOUNTER — OFFICE VISIT (OUTPATIENT)
Dept: HEMATOLOGY/ONCOLOGY | Facility: CLINIC | Age: 89
End: 2024-07-22
Payer: MEDICARE

## 2024-07-22 VITALS
HEIGHT: 68 IN | HEART RATE: 47 BPM | WEIGHT: 151.5 LBS | OXYGEN SATURATION: 97 % | SYSTOLIC BLOOD PRESSURE: 147 MMHG | BODY MASS INDEX: 22.96 KG/M2 | RESPIRATION RATE: 15 BRPM | DIASTOLIC BLOOD PRESSURE: 73 MMHG

## 2024-07-22 DIAGNOSIS — R59.1 LYMPHADENOPATHY: Primary | ICD-10-CM

## 2024-07-22 DIAGNOSIS — Z85.038 PERSONAL HISTORY OF COLON CANCER: ICD-10-CM

## 2024-07-22 PROCEDURE — 99999 PR PBB SHADOW E&M-EST. PATIENT-LVL IV: CPT | Mod: PBBFAC,,, | Performed by: INTERNAL MEDICINE

## 2024-07-22 PROCEDURE — 99214 OFFICE O/P EST MOD 30 MIN: CPT | Mod: PBBFAC | Performed by: INTERNAL MEDICINE

## 2024-07-23 ENCOUNTER — HOSPITAL ENCOUNTER (OUTPATIENT)
Dept: RADIOLOGY | Facility: HOSPITAL | Age: 89
Discharge: HOME | End: 2024-07-23
Attending: INTERNAL MEDICINE
Payer: MEDICARE

## 2024-07-23 DIAGNOSIS — Z85.038 PERSONAL HISTORY OF COLON CANCER: ICD-10-CM

## 2024-07-23 DIAGNOSIS — R59.1 LYMPHADENOPATHY: ICD-10-CM

## 2024-07-23 PROCEDURE — A9552 F18 FDG: HCPCS | Performed by: INTERNAL MEDICINE

## 2024-07-23 PROCEDURE — 78815 PET IMAGE W/CT SKULL-THIGH: CPT | Mod: TC

## 2024-07-23 RX ORDER — FLUDEOXYGLUCOSE F18 500 MCI/ML
10 INJECTION INTRAVENOUS
Status: COMPLETED | OUTPATIENT
Start: 2024-07-23 | End: 2024-07-23

## 2024-07-23 RX ADMIN — FLUDEOXYGLUCOSE F-18 11 MILLICURIE: 500 INJECTION INTRAVENOUS at 01:07

## 2024-07-25 LAB — POCT GLUCOSE: 107 MG/DL (ref 70–110)

## 2024-07-29 ENCOUNTER — TELEPHONE (OUTPATIENT)
Dept: HEMATOLOGY/ONCOLOGY | Facility: CLINIC | Age: 89
End: 2024-07-29
Payer: MEDICARE

## 2024-07-29 DIAGNOSIS — R59.1 LYMPHADENOPATHY: Primary | ICD-10-CM

## 2024-07-29 RX ORDER — PREDNISONE 20 MG/1
40 TABLET ORAL DAILY
Qty: 60 TABLET | Refills: 1 | Status: SHIPPED | OUTPATIENT
Start: 2024-07-29

## 2024-07-29 NOTE — TELEPHONE ENCOUNTER
Patient called to report he had his biopsy last week with Dr. Bradley and expects results sometime this week. He reports weakness and feels like talking is a strain for him. Dr. Gerber returned his call and instructed for him to take prednisone 20 mg ii tabs daily until final pathology is available. He is to keep his appt with us next week to discuss treatment recommendations. Dr. Gerber sent in Rx to [facility].

## 2024-07-31 NOTE — PROGRESS NOTES
Subjective:       Patient ID: Blaine Elmore Sr. is a 89 y.o. male.    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 Colonoscopy 9/11/17 showed a malignant-appearing mass in the cecum. Biopsy was positive for moderately differentiated adenocarcinoma. Preoperative CEA level was 3.5 ng/mL. CT scans of the chest, abdomen and pelvis 9/12/17 showed severe hepatic steatosis and hypodense lesions compatible with cysts. There was a 1.9 cm lytic appearing lesion in the right ischium and 3-4 mm bilateral pulmonary nodules. Bone scan 9/18/17 showed uptake in the proximal left tibia but no abnormal activity in the pelvis. Plain films showed no significant abnormalities. He underwent a right hemicolectomy 9/22/17. Final pathology revealed a 3.7 cm moderately differentiated adenocarcinoma extending into but not through the muscularis propria. Resection margins were clear. 2 out of 24 pericolonic lymph nodes were positive. He presented to [facility] for a treatment opinion. He was staged as IIIA. Recommendations were for adjuvant treatment with CAPOX for 3 months versus oral Xeloda for 6 months. He remained weak postoperatively, and decided he did not want to consider infusional chemotherapy.    [facility] to establish local Oncology care.  Treatment options were reviewed and discussed again.  He opted for therapy with oral Xeloda for 6 months as tolerated. Laboratory showed evidence of iron deficiency anemia which was treated with IV Injectafer x 2 doses with recovery of his blood counts.  He initiated Xeloda 11/27/17  and completed 6 months of therapy 5/18 without significant side effects. Follow-up CT scans of the chest, abdomen and pelvis 5/30/18 showed stable hypoattenuating liver lesions bilaterally, largest on the right measured 14 mm. There were stable on an outside CT scan and felt to represent cysts. There were also small, nonspecific pulmonary nodules, none measuring more than 4 mm that were also stable compared to his prior films. Follow-up CT scans 9/27/18 showed similar findings. There was also a stable lytic lesion in the right ischial tuberosity and right L1 pedicle and a mixed lytic/sclerotic focus in the right fourth rib. He had no associated symptoms. CT PET scan 2/5/19 showed unchanged small punctate pulmonary nodules with no significant hypermetabolic activity. 14 mm right hepatic lobe hypodensity showed no significant glucose uptake. The lucent lesions described in the right ilium, L1 and right fourth rib showed no glucose uptake. He has had no evidence of disease recurrence on follow-up and additional surveillance imaging.    He presented to his PCP 7/11/24 complaining of 15 lb weight loss over several weeks, weakness, fatigue and a nonproductive cough.  He denied any fever, chills or night sweats.   CXR 7/11/24:  Small right pleural effusion and widened mediastinum.  CT Chest 7/18/24:  Bulky confluent mediastinal adenopathy and left axillary lymph node measuring 1.7 x 1.1 cm with a partially imaged soniya mass in the abdomen encasing the superior mesenteric artery.  CT-PET 7/23/24:  Widespread hypermetabolic adenopathy involving the lower neck, mediastinum and abdomen.  Large conglomerate soniya mass in the midabdomen surrounding the aorta measuring up to 14 x 12 cm with a maximum SUV of 13.  No obvious sites of extranodal disease.  Left axillary LN biopsy 7/25/24:  Follicular lymphoma, grade 3b      Interval History  He returns to the office today for a follow-up visit accompanied by his son and daughter.  He  "continues to deny any fever, chills, night sweats or weight loss.  He complains of mild abdominal fullness and bloating.  No difficulty with his bowel movements.  No nausea or vomiting.  He has generalized weakness and fatigue.  Once his biopsy was performed, he was started on prednisone 40 mg daily.  He decreased his dose to 20 mg daily secondary to insomnia.      Review of Systems   Constitutional:  Positive for fatigue. Negative for appetite change, fever and unexpected weight change.   HENT:  Negative for mouth sores, sore throat and trouble swallowing.    Eyes: Negative.    Respiratory:  Positive for cough. Negative for chest tightness, shortness of breath and wheezing.    Cardiovascular:  Negative for chest pain, palpitations and leg swelling.   Gastrointestinal:  Negative for abdominal distention, abdominal pain, constipation, diarrhea and nausea.   Genitourinary:  Positive for frequency. Negative for dysuria, flank pain, hematuria and urgency.   Musculoskeletal:  Negative for arthralgias and back pain.   Integumentary:  Negative for pallor and rash.   Neurological:  Positive for weakness (Generalized, nonfocal). Negative for dizziness, numbness and headaches.   Hematological:  Negative for adenopathy. Does not bruise/bleed easily.   Psychiatric/Behavioral:  Positive for sleep disturbance. Negative for confusion and dysphoric mood. The patient is not nervous/anxious.         Bipolar disorder - stable       PMHx:  CAD, HTN, hypercholesterolemia, arthritis, diabetes mellitus with neuropathy, bipolar disorder, BPH, cataracts, melanoma right back 2005, anemia  PSHx:  Right hemicolectomy 9/17, colonoscopy, tonsillectomy, melanoma resection right lower back, Mediport, excision left arm lesion  SH:  No cigarettes, + smokeless tobacco, quit 2014.  FH:  His father had multiple myeloma.     Objective:        BP (!) 157/83   Pulse 65   Temp 97.9 °F (36.6 °C)   Resp 15   Ht 5' 8" (1.727 m)   Wt 68.8 kg (151 lb 11.2 " oz)   SpO2 (!) 91%   BMI 23.07 kg/m²    Physical Exam  Constitutional:       Comments: Elderly white male in [place]   HENT:      Head: Normocephalic.      Mouth/Throat:      Mouth: Mucous membranes are moist.      Pharynx: Oropharynx is clear. No posterior oropharyngeal erythema.   Eyes:      General: No scleral icterus.     Extraocular Movements: Extraocular movements intact.      Pupils: Pupils are equal, round, and reactive to light.   Cardiovascular:      Rate and Rhythm: Normal rate and regular rhythm.      Heart sounds: No murmur heard.  Pulmonary:      Comments: Lungs clear to auscultation  Abdominal:      General: Bowel sounds are normal. There is no distension.      Palpations: Abdomen is soft.      Tenderness: There is no abdominal tenderness.      Comments: Well-healed laparoscopic incision sites.  Palpable fullness midabdomen without discrete mass.  No splenomegaly.   Musculoskeletal:         General: No swelling or tenderness. Normal range of motion.      Cervical back: Neck supple. No tenderness.   Lymphadenopathy:      Comments: Healing left axillary incision.  No palpable peripheral lymphadenopathy.   Skin:     General: Skin is warm and dry.      Findings: No rash.      Comments: MediPort removal incision right chest wall.   Neurological:      General: No focal deficit present.      Mental Status: He is alert and oriented to person, place, and time.      Cranial Nerves: No cranial nerve deficit.      Motor: No weakness.       ECOG SCORE    2 - Capable of all selfcare but unable to carry out any work activities, active > 50% of hours          LABORATORY  Recent Results (from the past 168 hour(s))   Lactate Dehydrogenase    Collection Time: 08/01/24 10:43 AM   Result Value Ref Range    Lactate Dehydrogenase 159 125 - 220 U/L   Uric Acid    Collection Time: 08/01/24 10:43 AM   Result Value Ref Range    Uric Acid 5.2 3.5 - 7.2 mg/dL              Assessment:   Stage IIIA follicular NHL - grade 3b  Stage  III colon cancer - REGINALD      Plan:   CT-PET scan images and pathology report were reviewed in the office today in the presence of the patient and his family and discussed in detail.  He has fairly extensive lymphadenopathy without evidence of B symptoms.  He does have prominent weakness and fatigue.  Treatment options are limited by his advanced age.  I recommended treatment with single agent Rituximab 375 mg/m2 weekly for 4 weeks beginning next Monday on 8/5/24.  Benefits, risks, toxicities and side effects of Rituximab were discussed and reviewed in detail. All questions were answered. Literature regarding the treatment plan and specific drug information was also provided.  He understands that this treatment is unlikely to be curative but should result in some favorable response based on the sensitivity of his disease.  Baseline hepatitis-B serology was submitted today.  LDH and uric acid levels are normal.  He will be started on treatment with allopurinol 300 mg daily for tumor lysis prophylaxis.  Continue prednisone at 20 mg daily for now.  We will begin tapering after he starts treatment with Rituximab.  I will plan to evaluate him in 3-4 weeks with repeat laboratory.      SAMY NDIAYE MD    Other Physicians  Dr. Luis Jorgensen ([facility])

## 2024-08-01 ENCOUNTER — OFFICE VISIT (OUTPATIENT)
Dept: HEMATOLOGY/ONCOLOGY | Facility: CLINIC | Age: 89
End: 2024-08-01
Payer: MEDICARE

## 2024-08-01 VITALS
TEMPERATURE: 98 F | OXYGEN SATURATION: 91 % | WEIGHT: 151.69 LBS | HEART RATE: 65 BPM | BODY MASS INDEX: 22.99 KG/M2 | RESPIRATION RATE: 15 BRPM | DIASTOLIC BLOOD PRESSURE: 83 MMHG | HEIGHT: 68 IN | SYSTOLIC BLOOD PRESSURE: 157 MMHG

## 2024-08-01 DIAGNOSIS — C82.48 GRADE 3B FOLLICULAR LYMPHOMA OF LYMPH NODES OF MULTIPLE REGIONS: Primary | ICD-10-CM

## 2024-08-01 LAB
LDH SERPL-CCNC: 159 U/L (ref 125–220)
URATE SERPL-MCNC: 5.2 MG/DL (ref 3.5–7.2)

## 2024-08-01 PROCEDURE — 99214 OFFICE O/P EST MOD 30 MIN: CPT | Mod: PBBFAC | Performed by: INTERNAL MEDICINE

## 2024-08-01 PROCEDURE — 84550 ASSAY OF BLOOD/URIC ACID: CPT | Performed by: INTERNAL MEDICINE

## 2024-08-01 PROCEDURE — 36415 COLL VENOUS BLD VENIPUNCTURE: CPT | Performed by: INTERNAL MEDICINE

## 2024-08-01 PROCEDURE — 99999 PR PBB SHADOW E&M-EST. PATIENT-LVL IV: CPT | Mod: PBBFAC,,, | Performed by: INTERNAL MEDICINE

## 2024-08-01 PROCEDURE — 87340 HEPATITIS B SURFACE AG IA: CPT | Performed by: INTERNAL MEDICINE

## 2024-08-01 PROCEDURE — 86704 HEP B CORE ANTIBODY TOTAL: CPT | Performed by: INTERNAL MEDICINE

## 2024-08-01 PROCEDURE — 83615 LACTATE (LD) (LDH) ENZYME: CPT | Performed by: INTERNAL MEDICINE

## 2024-08-01 RX ORDER — SODIUM CHLORIDE 0.9 % (FLUSH) 0.9 %
10 SYRINGE (ML) INJECTION
OUTPATIENT
Start: 2024-08-05

## 2024-08-01 RX ORDER — HEPARIN 100 UNIT/ML
500 SYRINGE INTRAVENOUS
OUTPATIENT
Start: 2024-08-05

## 2024-08-01 RX ORDER — ACETAMINOPHEN 325 MG/1
650 TABLET ORAL
OUTPATIENT
Start: 2024-08-05

## 2024-08-01 RX ORDER — FAMOTIDINE 10 MG/ML
20 INJECTION INTRAVENOUS
OUTPATIENT
Start: 2024-08-05

## 2024-08-01 RX ORDER — MEPERIDINE HYDROCHLORIDE 50 MG/ML
25 INJECTION INTRAMUSCULAR; INTRAVENOUS; SUBCUTANEOUS
OUTPATIENT
Start: 2024-08-05

## 2024-08-01 RX ORDER — ALLOPURINOL 300 MG/1
300 TABLET ORAL DAILY
Qty: 15 TABLET | Refills: 0 | Status: SHIPPED | OUTPATIENT
Start: 2024-08-01 | End: 2025-08-01

## 2024-08-02 LAB
HBV CORE AB SERPL QL IA: NONREACTIVE
HBV SURFACE AG SERPL QL IA: NONREACTIVE

## 2024-08-03 RX ORDER — PROCHLORPERAZINE EDISYLATE 5 MG/ML
5 INJECTION INTRAMUSCULAR; INTRAVENOUS ONCE AS NEEDED
Status: CANCELLED
Start: 2024-08-05

## 2024-08-03 RX ORDER — EPINEPHRINE 0.3 MG/.3ML
0.3 INJECTION SUBCUTANEOUS ONCE AS NEEDED
Status: CANCELLED | OUTPATIENT
Start: 2024-08-05

## 2024-08-03 RX ORDER — DIPHENHYDRAMINE HYDROCHLORIDE 50 MG/ML
50 INJECTION INTRAMUSCULAR; INTRAVENOUS ONCE AS NEEDED
Status: CANCELLED | OUTPATIENT
Start: 2024-08-05

## 2024-08-05 ENCOUNTER — INFUSION (OUTPATIENT)
Dept: INFUSION THERAPY | Facility: HOSPITAL | Age: 89
End: 2024-08-05
Attending: INTERNAL MEDICINE
Payer: MEDICARE

## 2024-08-05 VITALS
SYSTOLIC BLOOD PRESSURE: 127 MMHG | RESPIRATION RATE: 16 BRPM | DIASTOLIC BLOOD PRESSURE: 85 MMHG | HEART RATE: 85 BPM | TEMPERATURE: 98 F

## 2024-08-05 DIAGNOSIS — C82.48 GRADE 3B FOLLICULAR LYMPHOMA OF LYMPH NODES OF MULTIPLE REGIONS: Primary | ICD-10-CM

## 2024-08-05 PROCEDURE — 96413 CHEMO IV INFUSION 1 HR: CPT

## 2024-08-05 PROCEDURE — 25000003 PHARM REV CODE 250: Performed by: INTERNAL MEDICINE

## 2024-08-05 PROCEDURE — 63600175 PHARM REV CODE 636 W HCPCS: Performed by: INTERNAL MEDICINE

## 2024-08-05 PROCEDURE — 96375 TX/PRO/DX INJ NEW DRUG ADDON: CPT

## 2024-08-05 PROCEDURE — 96415 CHEMO IV INFUSION ADDL HR: CPT

## 2024-08-05 RX ORDER — DIPHENHYDRAMINE HYDROCHLORIDE 50 MG/ML
50 INJECTION INTRAMUSCULAR; INTRAVENOUS ONCE AS NEEDED
Status: DISCONTINUED | OUTPATIENT
Start: 2024-08-05 | End: 2024-08-05 | Stop reason: HOSPADM

## 2024-08-05 RX ORDER — PROCHLORPERAZINE EDISYLATE 5 MG/ML
5 INJECTION INTRAMUSCULAR; INTRAVENOUS ONCE AS NEEDED
Status: DISCONTINUED | OUTPATIENT
Start: 2024-08-05 | End: 2024-08-05 | Stop reason: HOSPADM

## 2024-08-05 RX ORDER — SODIUM CHLORIDE 0.9 % (FLUSH) 0.9 %
10 SYRINGE (ML) INJECTION
Status: DISCONTINUED | OUTPATIENT
Start: 2024-08-05 | End: 2024-08-05 | Stop reason: HOSPADM

## 2024-08-05 RX ORDER — MEPERIDINE HYDROCHLORIDE 25 MG/ML
25 INJECTION INTRAMUSCULAR; INTRAVENOUS; SUBCUTANEOUS
Status: DISCONTINUED | OUTPATIENT
Start: 2024-08-05 | End: 2024-08-05 | Stop reason: HOSPADM

## 2024-08-05 RX ORDER — FAMOTIDINE 10 MG/ML
20 INJECTION INTRAVENOUS
Status: COMPLETED | OUTPATIENT
Start: 2024-08-05 | End: 2024-08-05

## 2024-08-05 RX ORDER — HEPARIN 100 UNIT/ML
500 SYRINGE INTRAVENOUS
Status: DISCONTINUED | OUTPATIENT
Start: 2024-08-05 | End: 2024-08-05 | Stop reason: HOSPADM

## 2024-08-05 RX ORDER — EPINEPHRINE 0.3 MG/.3ML
0.3 INJECTION SUBCUTANEOUS ONCE AS NEEDED
Status: DISCONTINUED | OUTPATIENT
Start: 2024-08-05 | End: 2024-08-05 | Stop reason: HOSPADM

## 2024-08-05 RX ORDER — ACETAMINOPHEN 325 MG/1
650 TABLET ORAL
Status: COMPLETED | OUTPATIENT
Start: 2024-08-05 | End: 2024-08-05

## 2024-08-05 RX ADMIN — ACETAMINOPHEN 650 MG: 325 TABLET ORAL at 08:08

## 2024-08-05 RX ADMIN — FAMOTIDINE 20 MG: 10 INJECTION INTRAVENOUS at 08:08

## 2024-08-05 RX ADMIN — SODIUM CHLORIDE 683 MG: 9 INJECTION, SOLUTION INTRAVENOUS at 08:08

## 2024-08-05 RX ADMIN — DIPHENHYDRAMINE HYDROCHLORIDE 25 MG: 50 INJECTION INTRAMUSCULAR; INTRAVENOUS at 08:08

## 2024-08-09 DIAGNOSIS — F51.01 PRIMARY INSOMNIA: Primary | ICD-10-CM

## 2024-08-09 RX ORDER — PROCHLORPERAZINE EDISYLATE 5 MG/ML
5 INJECTION INTRAMUSCULAR; INTRAVENOUS ONCE AS NEEDED
Start: 2024-08-12

## 2024-08-09 RX ORDER — EPINEPHRINE 0.3 MG/.3ML
0.3 INJECTION SUBCUTANEOUS ONCE AS NEEDED
OUTPATIENT
Start: 2024-08-12

## 2024-08-09 RX ORDER — HEPARIN 100 UNIT/ML
500 SYRINGE INTRAVENOUS
OUTPATIENT
Start: 2024-08-12

## 2024-08-09 RX ORDER — DIPHENHYDRAMINE HCL 25 MG
25 CAPSULE ORAL
OUTPATIENT
Start: 2024-08-12

## 2024-08-09 RX ORDER — FAMOTIDINE 20 MG/1
20 TABLET, FILM COATED ORAL
OUTPATIENT
Start: 2024-08-12

## 2024-08-09 RX ORDER — SODIUM CHLORIDE 0.9 % (FLUSH) 0.9 %
10 SYRINGE (ML) INJECTION
OUTPATIENT
Start: 2024-08-12

## 2024-08-09 RX ORDER — DIPHENHYDRAMINE HYDROCHLORIDE 50 MG/ML
50 INJECTION INTRAMUSCULAR; INTRAVENOUS ONCE AS NEEDED
OUTPATIENT
Start: 2024-08-12

## 2024-08-09 RX ORDER — ACETAMINOPHEN 325 MG/1
650 TABLET ORAL
OUTPATIENT
Start: 2024-08-12

## 2024-08-09 RX ORDER — ZOLPIDEM TARTRATE 5 MG/1
5 TABLET ORAL NIGHTLY PRN
Qty: 30 TABLET | Refills: 5 | Status: SHIPPED | OUTPATIENT
Start: 2024-08-09 | End: 2025-02-05

## 2024-08-09 RX ORDER — MEPERIDINE HYDROCHLORIDE 50 MG/ML
25 INJECTION INTRAMUSCULAR; INTRAVENOUS; SUBCUTANEOUS
OUTPATIENT
Start: 2024-08-12

## 2024-08-12 ENCOUNTER — INFUSION (OUTPATIENT)
Dept: INFUSION THERAPY | Facility: HOSPITAL | Age: 89
End: 2024-08-12
Attending: INTERNAL MEDICINE
Payer: MEDICARE

## 2024-08-12 VITALS
HEIGHT: 68 IN | BODY MASS INDEX: 22.99 KG/M2 | SYSTOLIC BLOOD PRESSURE: 154 MMHG | TEMPERATURE: 98 F | WEIGHT: 151.69 LBS | DIASTOLIC BLOOD PRESSURE: 77 MMHG | RESPIRATION RATE: 18 BRPM | HEART RATE: 90 BPM

## 2024-08-12 DIAGNOSIS — C82.48 GRADE 3B FOLLICULAR LYMPHOMA OF LYMPH NODES OF MULTIPLE REGIONS: Primary | ICD-10-CM

## 2024-08-12 PROCEDURE — 25000003 PHARM REV CODE 250: Performed by: INTERNAL MEDICINE

## 2024-08-12 PROCEDURE — 96401 CHEMO ANTI-NEOPL SQ/IM: CPT

## 2024-08-12 PROCEDURE — 63600175 PHARM REV CODE 636 W HCPCS: Mod: JZ,JG | Performed by: INTERNAL MEDICINE

## 2024-08-12 RX ORDER — DIPHENHYDRAMINE HYDROCHLORIDE 50 MG/ML
50 INJECTION INTRAMUSCULAR; INTRAVENOUS ONCE AS NEEDED
Status: DISCONTINUED | OUTPATIENT
Start: 2024-08-12 | End: 2024-08-12 | Stop reason: HOSPADM

## 2024-08-12 RX ORDER — EPINEPHRINE 0.3 MG/.3ML
0.3 INJECTION SUBCUTANEOUS ONCE AS NEEDED
Status: DISCONTINUED | OUTPATIENT
Start: 2024-08-12 | End: 2024-08-12 | Stop reason: HOSPADM

## 2024-08-12 RX ORDER — SODIUM CHLORIDE 0.9 % (FLUSH) 0.9 %
10 SYRINGE (ML) INJECTION
Status: DISCONTINUED | OUTPATIENT
Start: 2024-08-12 | End: 2024-08-12 | Stop reason: HOSPADM

## 2024-08-12 RX ORDER — FAMOTIDINE 20 MG/1
20 TABLET, FILM COATED ORAL
Status: COMPLETED | OUTPATIENT
Start: 2024-08-12 | End: 2024-08-12

## 2024-08-12 RX ORDER — DIPHENHYDRAMINE HCL 25 MG
25 CAPSULE ORAL
Status: COMPLETED | OUTPATIENT
Start: 2024-08-12 | End: 2024-08-12

## 2024-08-12 RX ORDER — ACETAMINOPHEN 325 MG/1
650 TABLET ORAL
Status: COMPLETED | OUTPATIENT
Start: 2024-08-12 | End: 2024-08-12

## 2024-08-12 RX ORDER — PROCHLORPERAZINE EDISYLATE 5 MG/ML
5 INJECTION INTRAMUSCULAR; INTRAVENOUS ONCE AS NEEDED
Status: DISCONTINUED | OUTPATIENT
Start: 2024-08-12 | End: 2024-08-12 | Stop reason: HOSPADM

## 2024-08-12 RX ORDER — MEPERIDINE HYDROCHLORIDE 25 MG/ML
25 INJECTION INTRAMUSCULAR; INTRAVENOUS; SUBCUTANEOUS
Status: DISCONTINUED | OUTPATIENT
Start: 2024-08-12 | End: 2024-08-12 | Stop reason: HOSPADM

## 2024-08-12 RX ORDER — HEPARIN 100 UNIT/ML
500 SYRINGE INTRAVENOUS
Status: DISCONTINUED | OUTPATIENT
Start: 2024-08-12 | End: 2024-08-12 | Stop reason: HOSPADM

## 2024-08-12 RX ADMIN — RITUXIMAB AND HYALURONIDASE 1400 MG: 120; 2000 INJECTION, SOLUTION SUBCUTANEOUS at 08:08

## 2024-08-12 RX ADMIN — ACETAMINOPHEN 650 MG: 325 TABLET ORAL at 08:08

## 2024-08-12 RX ADMIN — DIPHENHYDRAMINE HYDROCHLORIDE 25 MG: 25 CAPSULE ORAL at 08:08

## 2024-08-12 RX ADMIN — FAMOTIDINE 20 MG: 20 TABLET, FILM COATED ORAL at 08:08

## 2024-08-14 RX ORDER — DIPHENHYDRAMINE HCL 25 MG
25 CAPSULE ORAL
OUTPATIENT
Start: 2024-08-19

## 2024-08-14 RX ORDER — FAMOTIDINE 20 MG/1
20 TABLET, FILM COATED ORAL
OUTPATIENT
Start: 2024-08-19

## 2024-08-14 RX ORDER — EPINEPHRINE 0.3 MG/.3ML
0.3 INJECTION SUBCUTANEOUS ONCE AS NEEDED
OUTPATIENT
Start: 2024-08-19

## 2024-08-14 RX ORDER — DIPHENHYDRAMINE HYDROCHLORIDE 50 MG/ML
50 INJECTION INTRAMUSCULAR; INTRAVENOUS ONCE AS NEEDED
OUTPATIENT
Start: 2024-08-19

## 2024-08-14 RX ORDER — MEPERIDINE HYDROCHLORIDE 50 MG/ML
25 INJECTION INTRAMUSCULAR; INTRAVENOUS; SUBCUTANEOUS
OUTPATIENT
Start: 2024-08-19

## 2024-08-14 RX ORDER — SODIUM CHLORIDE 0.9 % (FLUSH) 0.9 %
10 SYRINGE (ML) INJECTION
OUTPATIENT
Start: 2024-08-19

## 2024-08-14 RX ORDER — HEPARIN 100 UNIT/ML
500 SYRINGE INTRAVENOUS
OUTPATIENT
Start: 2024-08-19

## 2024-08-14 RX ORDER — ACETAMINOPHEN 325 MG/1
650 TABLET ORAL
OUTPATIENT
Start: 2024-08-19

## 2024-08-14 RX ORDER — PROCHLORPERAZINE EDISYLATE 5 MG/ML
5 INJECTION INTRAMUSCULAR; INTRAVENOUS ONCE AS NEEDED
Start: 2024-08-19

## 2024-08-16 ENCOUNTER — LAB VISIT (OUTPATIENT)
Dept: LAB | Facility: HOSPITAL | Age: 89
End: 2024-08-16
Attending: INTERNAL MEDICINE
Payer: MEDICARE

## 2024-08-16 DIAGNOSIS — C82.48 GRADE 3B FOLLICULAR LYMPHOMA OF LYMPH NODES OF MULTIPLE REGIONS: ICD-10-CM

## 2024-08-16 LAB
ALBUMIN SERPL-MCNC: 3.5 G/DL (ref 3.4–4.8)
ALBUMIN/GLOB SERPL: 1.2 RATIO (ref 1.1–2)
ALP SERPL-CCNC: 91 UNIT/L (ref 40–150)
ALT SERPL-CCNC: 14 UNIT/L (ref 0–55)
ANION GAP SERPL CALC-SCNC: 8 MEQ/L
AST SERPL-CCNC: 13 UNIT/L (ref 5–34)
BASOPHILS # BLD AUTO: 0.05 X10(3)/MCL
BASOPHILS NFR BLD AUTO: 0.7 %
BILIRUB SERPL-MCNC: 0.4 MG/DL
BUN SERPL-MCNC: 24 MG/DL (ref 8.4–25.7)
CALCIUM SERPL-MCNC: 9 MG/DL (ref 8.8–10)
CHLORIDE SERPL-SCNC: 106 MMOL/L (ref 98–111)
CO2 SERPL-SCNC: 25 MMOL/L (ref 23–31)
CREAT SERPL-MCNC: 0.89 MG/DL (ref 0.73–1.18)
CREAT/UREA NIT SERPL: 27
EOSINOPHIL # BLD AUTO: 0.25 X10(3)/MCL (ref 0–0.9)
EOSINOPHIL NFR BLD AUTO: 3.7 %
ERYTHROCYTE [DISTWIDTH] IN BLOOD BY AUTOMATED COUNT: 13.7 % (ref 11.5–17)
GFR SERPLBLD CREATININE-BSD FMLA CKD-EPI: >60 ML/MIN/1.73/M2
GLOBULIN SER-MCNC: 2.9 GM/DL (ref 2.4–3.5)
GLUCOSE SERPL-MCNC: 104 MG/DL (ref 75–121)
HCT VFR BLD AUTO: 45.5 % (ref 42–52)
HGB BLD-MCNC: 15.1 G/DL (ref 14–18)
IMM GRANULOCYTES # BLD AUTO: 0.02 X10(3)/MCL (ref 0–0.04)
IMM GRANULOCYTES NFR BLD AUTO: 0.3 %
LDH SERPL-CCNC: 159 U/L (ref 125–220)
LYMPHOCYTES # BLD AUTO: 0.66 X10(3)/MCL (ref 0.6–4.6)
LYMPHOCYTES NFR BLD AUTO: 9.8 %
MCH RBC QN AUTO: 30.6 PG (ref 27–31)
MCHC RBC AUTO-ENTMCNC: 33.2 G/DL (ref 33–36)
MCV RBC AUTO: 92.1 FL (ref 80–94)
MONOCYTES # BLD AUTO: 0.73 X10(3)/MCL (ref 0.1–1.3)
MONOCYTES NFR BLD AUTO: 10.8 %
NEUTROPHILS # BLD AUTO: 5.03 X10(3)/MCL (ref 2.1–9.2)
NEUTROPHILS NFR BLD AUTO: 74.7 %
PLATELET # BLD AUTO: 180 X10(3)/MCL (ref 130–400)
PMV BLD AUTO: 8.8 FL (ref 7.4–10.4)
POTASSIUM SERPL-SCNC: 4.8 MMOL/L (ref 3.5–5.1)
PROT SERPL-MCNC: 6.4 GM/DL (ref 5.8–7.6)
RBC # BLD AUTO: 4.94 X10(6)/MCL (ref 4.7–6.1)
SODIUM SERPL-SCNC: 139 MMOL/L (ref 136–145)
URATE SERPL-MCNC: 4.4 MG/DL (ref 3.5–7.2)
WBC # BLD AUTO: 6.74 X10(3)/MCL (ref 4.5–11.5)

## 2024-08-16 PROCEDURE — 36415 COLL VENOUS BLD VENIPUNCTURE: CPT

## 2024-08-16 PROCEDURE — 85025 COMPLETE CBC W/AUTO DIFF WBC: CPT

## 2024-08-16 PROCEDURE — 83615 LACTATE (LD) (LDH) ENZYME: CPT

## 2024-08-16 PROCEDURE — 80053 COMPREHEN METABOLIC PANEL: CPT

## 2024-08-16 PROCEDURE — 84550 ASSAY OF BLOOD/URIC ACID: CPT

## 2024-08-19 ENCOUNTER — INFUSION (OUTPATIENT)
Dept: INFUSION THERAPY | Facility: HOSPITAL | Age: 89
End: 2024-08-19
Attending: INTERNAL MEDICINE
Payer: MEDICARE

## 2024-08-19 VITALS
BODY MASS INDEX: 21.98 KG/M2 | SYSTOLIC BLOOD PRESSURE: 157 MMHG | DIASTOLIC BLOOD PRESSURE: 77 MMHG | WEIGHT: 145 LBS | TEMPERATURE: 98 F | RESPIRATION RATE: 18 BRPM | HEART RATE: 71 BPM | HEIGHT: 68 IN

## 2024-08-19 DIAGNOSIS — C82.48 GRADE 3B FOLLICULAR LYMPHOMA OF LYMPH NODES OF MULTIPLE REGIONS: Primary | ICD-10-CM

## 2024-08-19 PROCEDURE — 96401 CHEMO ANTI-NEOPL SQ/IM: CPT

## 2024-08-19 PROCEDURE — 63600175 PHARM REV CODE 636 W HCPCS: Mod: JZ,JG | Performed by: NURSE PRACTITIONER

## 2024-08-19 PROCEDURE — 25000003 PHARM REV CODE 250: Performed by: NURSE PRACTITIONER

## 2024-08-19 RX ORDER — PROCHLORPERAZINE EDISYLATE 5 MG/ML
5 INJECTION INTRAMUSCULAR; INTRAVENOUS ONCE AS NEEDED
Status: DISCONTINUED | OUTPATIENT
Start: 2024-08-19 | End: 2024-08-19 | Stop reason: HOSPADM

## 2024-08-19 RX ORDER — DIPHENHYDRAMINE HYDROCHLORIDE 50 MG/ML
50 INJECTION INTRAMUSCULAR; INTRAVENOUS ONCE AS NEEDED
Status: DISCONTINUED | OUTPATIENT
Start: 2024-08-19 | End: 2024-08-19 | Stop reason: HOSPADM

## 2024-08-19 RX ORDER — HEPARIN 100 UNIT/ML
500 SYRINGE INTRAVENOUS
Status: DISCONTINUED | OUTPATIENT
Start: 2024-08-19 | End: 2024-08-19 | Stop reason: HOSPADM

## 2024-08-19 RX ORDER — EPINEPHRINE 0.3 MG/.3ML
0.3 INJECTION SUBCUTANEOUS ONCE AS NEEDED
Status: DISCONTINUED | OUTPATIENT
Start: 2024-08-19 | End: 2024-08-19 | Stop reason: HOSPADM

## 2024-08-19 RX ORDER — DIPHENHYDRAMINE HCL 25 MG
25 CAPSULE ORAL
Status: COMPLETED | OUTPATIENT
Start: 2024-08-19 | End: 2024-08-19

## 2024-08-19 RX ORDER — ACETAMINOPHEN 325 MG/1
650 TABLET ORAL
Status: COMPLETED | OUTPATIENT
Start: 2024-08-19 | End: 2024-08-19

## 2024-08-19 RX ORDER — FAMOTIDINE 20 MG/1
20 TABLET, FILM COATED ORAL
Status: COMPLETED | OUTPATIENT
Start: 2024-08-19 | End: 2024-08-19

## 2024-08-19 RX ORDER — MEPERIDINE HYDROCHLORIDE 25 MG/ML
25 INJECTION INTRAMUSCULAR; INTRAVENOUS; SUBCUTANEOUS
Status: DISCONTINUED | OUTPATIENT
Start: 2024-08-19 | End: 2024-08-19 | Stop reason: HOSPADM

## 2024-08-19 RX ORDER — SODIUM CHLORIDE 0.9 % (FLUSH) 0.9 %
10 SYRINGE (ML) INJECTION
Status: DISCONTINUED | OUTPATIENT
Start: 2024-08-19 | End: 2024-08-19 | Stop reason: HOSPADM

## 2024-08-19 RX ADMIN — DIPHENHYDRAMINE HYDROCHLORIDE 25 MG: 25 CAPSULE ORAL at 08:08

## 2024-08-19 RX ADMIN — RITUXIMAB AND HYALURONIDASE 1400 MG: 120; 2000 INJECTION, SOLUTION SUBCUTANEOUS at 08:08

## 2024-08-19 RX ADMIN — FAMOTIDINE 20 MG: 20 TABLET, FILM COATED ORAL at 08:08

## 2024-08-19 RX ADMIN — ACETAMINOPHEN 650 MG: 325 TABLET ORAL at 08:08

## 2024-08-20 NOTE — PROGRESS NOTES
Subjective:       Patient ID: Blaine Elmore Sr. is a 89 y.o. male.    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 Colonoscopy 9/11/17 showed a malignant-appearing mass in the cecum. Biopsy was positive for moderately differentiated adenocarcinoma. Preoperative CEA level was 3.5 ng/mL. CT scans of the chest, abdomen and pelvis 9/12/17 showed severe hepatic steatosis and hypodense lesions compatible with cysts. There was a 1.9 cm lytic appearing lesion in the right ischium and 3-4 mm bilateral pulmonary nodules. Bone scan 9/18/17 showed uptake in the proximal left tibia but no abnormal activity in the pelvis. Plain films showed no significant abnormalities. He underwent a right hemicolectomy 9/22/17. Final pathology revealed a 3.7 cm moderately differentiated adenocarcinoma extending into but not through the muscularis propria. Resection margins were clear. 2 out of 24 pericolonic lymph nodes were positive. He presented to [facility] for a treatment opinion. He was staged as IIIA. Recommendations were for adjuvant treatment with CAPOX for 3 months versus oral Xeloda for 6 months. He remained weak postoperatively, and decided he did not want to consider infusional chemotherapy.    [facility] care.  Treatment options were reviewed and discussed again.  He opted for therapy with oral Xeloda for 6 months as tolerated. Laboratory showed evidence of iron deficiency anemia which was treated with IV Injectafer x 2 doses with recovery of his blood counts.  He  initiated Xeloda 11/27/17 and completed 6 months of therapy 5/18 without significant side effects. Follow-up CT scans of the chest, abdomen and pelvis 5/30/18 showed stable hypoattenuating liver lesions bilaterally, largest on the right measured 14 mm. There were stable on an outside CT scan and felt to represent cysts. There were also small, nonspecific pulmonary nodules, none measuring more than 4 mm that were also stable compared to his prior films. Follow-up CT scans 9/27/18 showed similar findings. There was also a stable lytic lesion in the right ischial tuberosity and right L1 pedicle and a mixed lytic/sclerotic focus in the right fourth rib. He had no associated symptoms. CT PET scan 2/5/19 showed unchanged small punctate pulmonary nodules with no significant hypermetabolic activity. 14 mm right hepatic lobe hypodensity showed no significant glucose uptake. The lucent lesions described in the right ilium, L1 and right fourth rib showed no glucose uptake. He has had no evidence of disease recurrence on follow-up and additional surveillance imaging.    He presented to his PCP 7/11/24 complaining of 15 lb weight loss over several weeks, weakness, fatigue and a nonproductive cough.  He denied any fever, chills or night sweats.   CXR 7/11/24:  Small right pleural effusion and widened mediastinum.  CT Chest 7/18/24:  Bulky confluent mediastinal adenopathy and left axillary lymph node measuring 1.7 x 1.1 cm with a partially imaged soniya mass in the abdomen encasing the superior mesenteric artery.  CT-PET 7/23/24:  Widespread hypermetabolic adenopathy involving the lower neck, mediastinum and abdomen.  Large conglomerate soniya mass in the midabdomen surrounding the aorta measuring up to 14 x 12 cm with a maximum SUV of 13.  No obvious sites of extranodal disease.  Left axillary LN biopsy 7/25/24:  Follicular lymphoma, grade 3b    Due to his advanced age, treatment was recommended with single agent Rituximab 375  mg/m2 weekly for 4 weeks. Hepatitis-B serology was negative.  Baseline LDH and uric acid levels were normal.  He started treatment on 8/5/24.  He received allopurinol for tumor lysis prophylaxis.      Interval History  He returns to clinic today for a three-week follow-up visit accompanied by his son.  Today is his 4th infusion of rituximab.  He has tolerated therapy well with no significant side effects or evidence of infusion reactions.  He reports improvement in his abdominal bloating.  His appetite improved on prednisone and his weight has remained stable.  He denies any fever or night sweats.  His CBC remains normal.        Review of Systems   Constitutional:  Positive for fatigue (Improved). Negative for appetite change, fever and unexpected weight change.   HENT:  Negative for mouth sores, sore throat and trouble swallowing.    Eyes: Negative.    Respiratory:  Positive for cough (improved). Negative for shortness of breath and wheezing.    Cardiovascular:  Negative for chest pain, palpitations and leg swelling.   Gastrointestinal:  Negative for abdominal distention, abdominal pain, constipation, diarrhea and nausea.   Genitourinary:  Positive for frequency. Negative for dysuria, flank pain, hematuria and urgency.   Musculoskeletal:  Negative for arthralgias and back pain.   Integumentary:  Negative for pallor and rash.   Neurological:  Positive for weakness (Generalized, nonfocal). Negative for dizziness, numbness and headaches.   Hematological:  Negative for adenopathy. Does not bruise/bleed easily.   Psychiatric/Behavioral:  Positive for sleep disturbance. Negative for confusion and dysphoric mood. The patient is not nervous/anxious.         Bipolar disorder - stable       PMHx:  CAD, HTN, hypercholesterolemia, arthritis, diabetes mellitus with neuropathy, bipolar disorder, BPH, cataracts, melanoma right back 2005, anemia  PSHx:  Right hemicolectomy 9/17, colonoscopy, tonsillectomy, melanoma resection right  "lower back, Mediport, excision left arm lesion  SH:  No cigarettes, + smokeless tobacco, quit 2014.  FH:  His father had multiple myeloma.     Objective:        BP (!) 153/83   Pulse (!) 43   Temp 97.5 °F (36.4 °C)   Resp 15   Ht 5' 8" (1.727 m)   Wt 68.2 kg (150 lb 6.4 oz)   SpO2 97%   BMI 22.87 kg/m²    Physical Exam  Constitutional:       Comments: Elderly white male in NAD, ambulatory with a cane   HENT:      Head: Normocephalic.      Mouth/Throat:      Mouth: Mucous membranes are moist.      Pharynx: Oropharynx is clear. No posterior oropharyngeal erythema.   Eyes:      General: No scleral icterus.     Extraocular Movements: Extraocular movements intact.      Pupils: Pupils are equal, round, and reactive to light.   Cardiovascular:      Rate and Rhythm: Normal rate and regular rhythm.      Heart sounds: No murmur heard.  Pulmonary:      Comments: Lungs clear to auscultation  Abdominal:      General: Bowel sounds are normal.      Palpations: Abdomen is soft.      Comments: Well-healed laparoscopic incision sites.  Slightly distended, but nontender.  No palpable fullness, mass or splenomegaly.   Musculoskeletal:         General: No swelling or tenderness.      Cervical back: Neck supple. No tenderness.   Lymphadenopathy:      Comments: Healing left axillary incision.  No palpable peripheral lymphadenopathy.   Skin:     General: Skin is warm and dry.      Findings: No rash.      Comments: MediPort removal incision right chest wall.   Neurological:      General: No focal deficit present.      Mental Status: He is alert and oriented to person, place, and time.      Cranial Nerves: No cranial nerve deficit.      Motor: No weakness.       ECOG SCORE    1 - Restricted in strenuous activity-ambulatory and able to carry out work of a light nature          LABORATORY  Recent Results (from the past 168 hour(s))   CBC with Differential    Collection Time: 08/26/24  9:08 AM   Result Value Ref Range    WBC 7.73 4.50 - " 11.50 x10(3)/mcL    RBC 4.89 4.70 - 6.10 x10(6)/mcL    Hgb 15.0 14.0 - 18.0 g/dL    Hct 45.7 42.0 - 52.0 %    MCV 93.5 80.0 - 94.0 fL    MCH 30.7 27.0 - 31.0 pg    MCHC 32.8 (L) 33.0 - 36.0 g/dL    RDW 14.0 11.5 - 17.0 %    Platelet 204 130 - 400 x10(3)/mcL    MPV 9.1 7.4 - 10.4 fL    Neut % 77.9 %    Lymph % 9.2 %    Mono % 8.0 %    Eos % 3.4 %    Basophil % 0.5 %    Lymph # 0.71 0.6 - 4.6 x10(3)/mcL    Neut # 6.02 2.1 - 9.2 x10(3)/mcL    Mono # 0.62 0.1 - 1.3 x10(3)/mcL    Eos # 0.26 0 - 0.9 x10(3)/mcL    Baso # 0.04 <=0.2 x10(3)/mcL    IG# 0.08 (H) 0 - 0.04 x10(3)/mcL    IG% 1.0 %            Assessment:   Stage IIIA follicular NHL - grade 3b  Stage III colon cancer - REGINALD      Plan:   Week number 4 Rituximab today.  Treatment has been well tolerated.  He does have evidence of symptomatic improvement.  Decrease prednisone to 10 mg daily and if he continues to do well, we will discontinue in 7-10 days.  Allopurinol can be discontinued at this point.  Schedule follow-up CT scans of the chest, abdomen and pelvis in 6 weeks.  If he has a favorable response to therapy, consider maintenance treatment with Rituximab every 8 weeks.      SAMY NDIAYE MD    Other Physicians  Dr. Luis Jorgensen ([facility])

## 2024-08-26 ENCOUNTER — INFUSION (OUTPATIENT)
Dept: INFUSION THERAPY | Facility: HOSPITAL | Age: 89
End: 2024-08-26
Attending: INTERNAL MEDICINE
Payer: MEDICARE

## 2024-08-26 ENCOUNTER — OFFICE VISIT (OUTPATIENT)
Dept: HEMATOLOGY/ONCOLOGY | Facility: CLINIC | Age: 89
End: 2024-08-26
Payer: MEDICARE

## 2024-08-26 ENCOUNTER — LAB VISIT (OUTPATIENT)
Dept: LAB | Facility: HOSPITAL | Age: 89
End: 2024-08-26
Attending: INTERNAL MEDICINE
Payer: MEDICARE

## 2024-08-26 VITALS
SYSTOLIC BLOOD PRESSURE: 153 MMHG | OXYGEN SATURATION: 97 % | DIASTOLIC BLOOD PRESSURE: 83 MMHG | BODY MASS INDEX: 22.79 KG/M2 | WEIGHT: 150.38 LBS | RESPIRATION RATE: 15 BRPM | HEART RATE: 43 BPM | TEMPERATURE: 98 F | HEIGHT: 68 IN

## 2024-08-26 VITALS
OXYGEN SATURATION: 97 % | RESPIRATION RATE: 15 BRPM | TEMPERATURE: 98 F | SYSTOLIC BLOOD PRESSURE: 153 MMHG | WEIGHT: 150.38 LBS | HEIGHT: 68 IN | BODY MASS INDEX: 22.79 KG/M2 | DIASTOLIC BLOOD PRESSURE: 83 MMHG | HEART RATE: 77 BPM

## 2024-08-26 DIAGNOSIS — C82.48 GRADE 3B FOLLICULAR LYMPHOMA OF LYMPH NODES OF MULTIPLE REGIONS: Primary | ICD-10-CM

## 2024-08-26 DIAGNOSIS — C82.48 GRADE 3B FOLLICULAR LYMPHOMA OF LYMPH NODES OF MULTIPLE REGIONS: ICD-10-CM

## 2024-08-26 LAB
ALBUMIN SERPL-MCNC: 3.4 G/DL (ref 3.4–4.8)
ALBUMIN/GLOB SERPL: 1.3 RATIO (ref 1.1–2)
ALP SERPL-CCNC: 100 UNIT/L (ref 40–150)
ALT SERPL-CCNC: 21 UNIT/L (ref 0–55)
ANION GAP SERPL CALC-SCNC: 10 MEQ/L
AST SERPL-CCNC: 13 UNIT/L (ref 5–34)
BASOPHILS # BLD AUTO: 0.04 X10(3)/MCL
BASOPHILS NFR BLD AUTO: 0.5 %
BILIRUB SERPL-MCNC: 0.4 MG/DL
BUN SERPL-MCNC: 30.9 MG/DL (ref 8.4–25.7)
CALCIUM SERPL-MCNC: 8.8 MG/DL (ref 8.8–10)
CHLORIDE SERPL-SCNC: 108 MMOL/L (ref 98–111)
CO2 SERPL-SCNC: 26 MMOL/L (ref 23–31)
CREAT SERPL-MCNC: 0.81 MG/DL (ref 0.73–1.18)
CREAT/UREA NIT SERPL: 38
EOSINOPHIL # BLD AUTO: 0.26 X10(3)/MCL (ref 0–0.9)
EOSINOPHIL NFR BLD AUTO: 3.4 %
ERYTHROCYTE [DISTWIDTH] IN BLOOD BY AUTOMATED COUNT: 14 % (ref 11.5–17)
GFR SERPLBLD CREATININE-BSD FMLA CKD-EPI: >60 ML/MIN/1.73/M2
GLOBULIN SER-MCNC: 2.7 GM/DL (ref 2.4–3.5)
GLUCOSE SERPL-MCNC: 94 MG/DL (ref 75–121)
HCT VFR BLD AUTO: 45.7 % (ref 42–52)
HGB BLD-MCNC: 15 G/DL (ref 14–18)
IMM GRANULOCYTES # BLD AUTO: 0.08 X10(3)/MCL (ref 0–0.04)
IMM GRANULOCYTES NFR BLD AUTO: 1 %
LYMPHOCYTES # BLD AUTO: 0.71 X10(3)/MCL (ref 0.6–4.6)
LYMPHOCYTES NFR BLD AUTO: 9.2 %
MCH RBC QN AUTO: 30.7 PG (ref 27–31)
MCHC RBC AUTO-ENTMCNC: 32.8 G/DL (ref 33–36)
MCV RBC AUTO: 93.5 FL (ref 80–94)
MONOCYTES # BLD AUTO: 0.62 X10(3)/MCL (ref 0.1–1.3)
MONOCYTES NFR BLD AUTO: 8 %
NEUTROPHILS # BLD AUTO: 6.02 X10(3)/MCL (ref 2.1–9.2)
NEUTROPHILS NFR BLD AUTO: 77.9 %
PLATELET # BLD AUTO: 204 X10(3)/MCL (ref 130–400)
PMV BLD AUTO: 9.1 FL (ref 7.4–10.4)
POTASSIUM SERPL-SCNC: 5.1 MMOL/L (ref 3.5–5.1)
PROT SERPL-MCNC: 6.1 GM/DL (ref 5.8–7.6)
RBC # BLD AUTO: 4.89 X10(6)/MCL (ref 4.7–6.1)
SODIUM SERPL-SCNC: 144 MMOL/L (ref 136–145)
WBC # BLD AUTO: 7.73 X10(3)/MCL (ref 4.5–11.5)

## 2024-08-26 PROCEDURE — 85025 COMPLETE CBC W/AUTO DIFF WBC: CPT

## 2024-08-26 PROCEDURE — 96401 CHEMO ANTI-NEOPL SQ/IM: CPT

## 2024-08-26 PROCEDURE — 36415 COLL VENOUS BLD VENIPUNCTURE: CPT

## 2024-08-26 PROCEDURE — 25000003 PHARM REV CODE 250: Performed by: INTERNAL MEDICINE

## 2024-08-26 PROCEDURE — 63600175 PHARM REV CODE 636 W HCPCS: Mod: JZ,JG | Performed by: INTERNAL MEDICINE

## 2024-08-26 PROCEDURE — 99999 PR PBB SHADOW E&M-EST. PATIENT-LVL IV: CPT | Mod: PBBFAC,,, | Performed by: INTERNAL MEDICINE

## 2024-08-26 PROCEDURE — 80053 COMPREHEN METABOLIC PANEL: CPT

## 2024-08-26 PROCEDURE — 99214 OFFICE O/P EST MOD 30 MIN: CPT | Mod: PBBFAC,25 | Performed by: INTERNAL MEDICINE

## 2024-08-26 RX ORDER — EPINEPHRINE 0.3 MG/.3ML
0.3 INJECTION SUBCUTANEOUS ONCE AS NEEDED
Status: CANCELLED | OUTPATIENT
Start: 2024-08-26

## 2024-08-26 RX ORDER — ACETAMINOPHEN 325 MG/1
650 TABLET ORAL
Status: COMPLETED | OUTPATIENT
Start: 2024-08-26 | End: 2024-08-26

## 2024-08-26 RX ORDER — DIPHENHYDRAMINE HCL 25 MG
25 CAPSULE ORAL
Status: CANCELLED | OUTPATIENT
Start: 2024-08-26

## 2024-08-26 RX ORDER — DIPHENHYDRAMINE HCL 25 MG
25 CAPSULE ORAL
Status: COMPLETED | OUTPATIENT
Start: 2024-08-26 | End: 2024-08-26

## 2024-08-26 RX ORDER — FAMOTIDINE 20 MG/1
20 TABLET, FILM COATED ORAL
Status: CANCELLED | OUTPATIENT
Start: 2024-08-26

## 2024-08-26 RX ORDER — FAMOTIDINE 20 MG/1
20 TABLET, FILM COATED ORAL
Status: COMPLETED | OUTPATIENT
Start: 2024-08-26 | End: 2024-08-26

## 2024-08-26 RX ORDER — DIPHENHYDRAMINE HYDROCHLORIDE 50 MG/ML
50 INJECTION INTRAMUSCULAR; INTRAVENOUS ONCE AS NEEDED
Status: CANCELLED | OUTPATIENT
Start: 2024-08-26

## 2024-08-26 RX ORDER — PROCHLORPERAZINE EDISYLATE 5 MG/ML
5 INJECTION INTRAMUSCULAR; INTRAVENOUS ONCE AS NEEDED
Status: CANCELLED
Start: 2024-08-26

## 2024-08-26 RX ORDER — ACETAMINOPHEN 325 MG/1
650 TABLET ORAL
Status: CANCELLED | OUTPATIENT
Start: 2024-08-26

## 2024-08-26 RX ORDER — HEPARIN 100 UNIT/ML
500 SYRINGE INTRAVENOUS
Status: CANCELLED | OUTPATIENT
Start: 2024-08-26

## 2024-08-26 RX ORDER — SODIUM CHLORIDE 0.9 % (FLUSH) 0.9 %
10 SYRINGE (ML) INJECTION
Status: CANCELLED | OUTPATIENT
Start: 2024-08-26

## 2024-08-26 RX ORDER — MEPERIDINE HYDROCHLORIDE 50 MG/ML
25 INJECTION INTRAMUSCULAR; INTRAVENOUS; SUBCUTANEOUS
Status: CANCELLED | OUTPATIENT
Start: 2024-08-26

## 2024-08-26 RX ADMIN — RITUXIMAB AND HYALURONIDASE 1400 MG: 120; 2000 INJECTION, SOLUTION SUBCUTANEOUS at 10:08

## 2024-08-26 RX ADMIN — DIPHENHYDRAMINE HYDROCHLORIDE 25 MG: 25 CAPSULE ORAL at 10:08

## 2024-08-26 RX ADMIN — FAMOTIDINE 20 MG: 20 TABLET, FILM COATED ORAL at 10:08

## 2024-08-26 RX ADMIN — ACETAMINOPHEN 650 MG: 325 TABLET ORAL at 10:08

## 2024-09-24 NOTE — PROGRESS NOTES
Subjective:       Patient ID: Blaine Elmore Sr. is a 89 y.o. male.    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 Colonoscopy 9/11/17 showed a malignant-appearing mass in the cecum. Biopsy was positive for moderately differentiated adenocarcinoma. Preoperative CEA level was 3.5 ng/mL. CT scans of the chest, abdomen and pelvis 9/12/17 showed severe hepatic steatosis and hypodense lesions compatible with cysts. There was a 1.9 cm lytic appearing lesion in the right ischium and 3-4 mm bilateral pulmonary nodules. Bone scan 9/18/17 showed uptake in the proximal left tibia but no abnormal activity in the pelvis. Plain films showed no significant abnormalities. He underwent a right hemicolectomy 9/22/17. Final pathology revealed a 3.7 cm moderately differentiated adenocarcinoma extending into but not through the muscularis propria. Resection margins were clear. 2 out of 24 pericolonic lymph nodes were positive. He presented to [facility] for a treatment opinion. He was staged as IIIA. Recommendations were for adjuvant treatment with CAPOX for 3 months versus oral Xeloda for 6 months. He remained weak postoperatively, and decided he did not want to consider infusional chemotherapy.    [facility] local Oncology care.  Treatment options were reviewed and discussed again.  He opted for therapy with oral Xeloda for 6 months as tolerated. Laboratory showed evidence of iron deficiency anemia which was treated with IV Injectafer x 2 doses with recovery of his blood counts.  He  initiated Xeloda 11/27/17 and completed 6 months of therapy 5/18 without significant side effects. Follow-up CT scans of the chest, abdomen and pelvis 5/30/18 showed stable hypoattenuating liver lesions bilaterally, largest on the right measured 14 mm. There were stable on an outside CT scan and felt to represent cysts. There were also small, nonspecific pulmonary nodules, none measuring more than 4 mm that were also stable compared to his prior films. Follow-up CT scans 9/27/18 showed similar findings. There was also a stable lytic lesion in the right ischial tuberosity and right L1 pedicle and a mixed lytic/sclerotic focus in the right fourth rib. He had no associated symptoms. CT PET scan 2/5/19 showed unchanged small punctate pulmonary nodules with no significant hypermetabolic activity. 14 mm right hepatic lobe hypodensity showed no significant glucose uptake. The lucent lesions described in the right ilium, L1 and right fourth rib showed no glucose uptake. He has had no evidence of disease recurrence on follow-up and additional surveillance imaging.    He presented to his PCP 7/11/24 complaining of 15 lb weight loss over several weeks, weakness, fatigue and a nonproductive cough.  He denied any fever, chills or night sweats.   CXR 7/11/24:  Small right pleural effusion and widened mediastinum.  CT Chest 7/18/24:  Bulky confluent mediastinal adenopathy and left axillary lymph node measuring 1.7 x 1.1 cm with a partially imaged soniya mass in the abdomen encasing the superior mesenteric artery.  CT-PET 7/23/24:  Widespread hypermetabolic adenopathy involving the lower neck, mediastinum and abdomen.  Large conglomerate soniya mass in the midabdomen surrounding the aorta measuring up to 14 x 12 cm with a maximum SUV of 13.  No obvious sites of extranodal disease.  Left axillary LN biopsy 7/25/24:  Follicular lymphoma, grade 3b    Due to his advanced age, treatment was recommended with single agent Rituximab 375  mg/m2 weekly for 4 weeks. Hepatitis-B serology was negative.  Baseline LDH and uric acid levels were normal.  He started treatment on 8/5/24.  He received allopurinol for tumor lysis prophylaxis.  He completed 4 weeks of therapy 8/26/24.    CT C/A/P 10/04/24:  Decreased size of all areas of adenopathy.  Right paratracheal 3.4 x 2.9 cm (previous 4.3 x 3.6 cm). No significant axillary or supraclavicular adenopathy.  Decreased right pleural effusion.  Confluent retroperitoneal soniya mass approximately 8 cm AP (previous 10.6 cm).      Interval History  He returns to the office today for a 6 week follow-up visit accompanied by his son.  He feels better, his appetite has improved.  He has gained weight.  He has less abdominal bloating.  He denies any fever, chills or night sweats.  CT scans of the chest, abdomen and pelvis 10/4/24 showed decreased mediastinal and retroperitoneal adenopathy as documented above.        Review of Systems   Constitutional:  Negative for appetite change, fatigue, fever and unexpected weight change.   HENT:  Negative for mouth sores, sore throat and trouble swallowing.    Eyes: Negative.    Respiratory:  Negative for cough, shortness of breath and wheezing.    Cardiovascular:  Negative for chest pain, palpitations and leg swelling.   Gastrointestinal:  Negative for abdominal distention, abdominal pain, constipation, diarrhea and nausea.   Genitourinary:  Positive for frequency. Negative for dysuria, flank pain, hematuria and urgency.   Musculoskeletal:  Negative for arthralgias and back pain.   Integumentary:  Negative for pallor and rash.   Neurological:  Positive for weakness (Generalized, nonfocal). Negative for dizziness, numbness and headaches.   Hematological:  Negative for adenopathy. Does not bruise/bleed easily.   Psychiatric/Behavioral:  Positive for sleep disturbance (Chronic). Negative for confusion and dysphoric mood. The patient is not nervous/anxious.         Bipolar disorder -  "stable       PMHx:  CAD, HTN, hypercholesterolemia, arthritis, diabetes mellitus with neuropathy, bipolar disorder, BPH, cataracts, melanoma right back 2005, anemia  PSHx:  Right hemicolectomy 9/17, colonoscopy, tonsillectomy, melanoma resection right lower back, Mediport, excision left arm lesion  SH:  No cigarettes, + smokeless tobacco, quit 2014.  FH:  His father had multiple myeloma.     Objective:        /76 (BP Location: Right arm, Patient Position: Sitting)   Pulse 71   Temp 97.8 °F (36.6 °C)   Resp 16   Ht 5' 9" (1.753 m)   Wt 71.7 kg (158 lb)   SpO2 97%   BMI 23.33 kg/m²    Physical Exam  Constitutional:       Comments: Elderly white male in NAD, ambulatory with a cane   HENT:      Head: Normocephalic.      Mouth/Throat:      Mouth: Mucous membranes are moist.      Pharynx: Oropharynx is clear. No posterior oropharyngeal erythema.   Eyes:      General: No scleral icterus.     Extraocular Movements: Extraocular movements intact.      Pupils: Pupils are equal, round, and reactive to light.   Cardiovascular:      Rate and Rhythm: Normal rate and regular rhythm.      Heart sounds: No murmur heard.  Pulmonary:      Comments: Lungs clear to auscultation  Abdominal:      General: Bowel sounds are normal.      Palpations: Abdomen is soft.      Comments: Well-healed laparoscopic incision sites.  Slightly distended, but nontender.  No palpable fullness, mass or splenomegaly.   Musculoskeletal:         General: No swelling or tenderness.      Cervical back: Neck supple. No tenderness.   Lymphadenopathy:      Comments: Healing left axillary incision.  No palpable peripheral lymphadenopathy.   Skin:     General: Skin is warm and dry.      Findings: No rash.      Comments: MediPort removal incision right chest wall.   Neurological:      General: No focal deficit present.      Mental Status: He is alert and oriented to person, place, and time.      Cranial Nerves: No cranial nerve deficit.      Motor: No " weakness.       ECOG SCORE    1 - Restricted in strenuous activity-ambulatory and able to carry out work of a light nature          LABORATORY  Recent Results (from the past week)   Comprehensive Metabolic Panel    Collection Time: 10/07/24  8:16 AM   Result Value Ref Range    Sodium 142 136 - 145 mmol/L    Potassium 4.5 3.5 - 5.1 mmol/L    Chloride 111 98 - 111 mmol/L    CO2 24 23 - 31 mmol/L    Glucose 92 75 - 121 mg/dL    Blood Urea Nitrogen 15.9 8.4 - 25.7 mg/dL    Creatinine 0.94 0.73 - 1.18 mg/dL    Calcium 8.9 8.8 - 10.0 mg/dL    Protein Total 6.4 5.8 - 7.6 gm/dL    Albumin 3.6 3.4 - 4.8 g/dL    Globulin 2.8 2.4 - 3.5 gm/dL    Albumin/Globulin Ratio 1.3 1.1 - 2.0 ratio    Bilirubin Total 0.4 <=1.5 mg/dL    ALP 78 40 - 150 unit/L    ALT 9 0 - 55 unit/L    AST 12 5 - 34 unit/L    eGFR >60 mL/min/1.73/m2    Anion Gap 7.0 mEq/L    BUN/Creatinine Ratio 17    Lactate Dehydrogenase    Collection Time: 10/07/24  8:16 AM   Result Value Ref Range    Lactate Dehydrogenase 172 125 - 220 U/L   CBC with Differential    Collection Time: 10/07/24  8:16 AM   Result Value Ref Range    WBC 6.52 4.50 - 11.50 x10(3)/mcL    RBC 4.71 4.70 - 6.10 x10(6)/mcL    Hgb 14.5 14.0 - 18.0 g/dL    Hct 43.9 42.0 - 52.0 %    MCV 93.2 80.0 - 94.0 fL    MCH 30.8 27.0 - 31.0 pg    MCHC 33.0 33.0 - 36.0 g/dL    RDW 14.2 11.5 - 17.0 %    Platelet 224 130 - 400 x10(3)/mcL    MPV 8.9 7.4 - 10.4 fL    Neut % 63.9 %    Lymph % 21.0 %    Mono % 11.5 %    Eos % 2.5 %    Basophil % 0.8 %    Lymph # 1.37 0.6 - 4.6 x10(3)/mcL    Neut # 4.17 2.1 - 9.2 x10(3)/mcL    Mono # 0.75 0.1 - 1.3 x10(3)/mcL    Eos # 0.16 0 - 0.9 x10(3)/mcL    Baso # 0.05 <=0.2 x10(3)/mcL    IG# 0.02 0 - 0.04 x10(3)/mcL    IG% 0.3 %         Assessment:   Stage IIIA follicular NHL - grade 3b  Stage III colon cancer - REGINALD      Plan:   CT images were reviewed in the office in the presence of the patient and his son.  His previous bulky disease shows significant improvement following 4  cycles of Rituxan and he has symptomatic improvement as well.  Given his initial large volume disease, recommend continuing maintenance therapy with Rituxan every 8 weeks with follow-up imaging in 3-4 months.  Treatment plan reviewed and discussed with the patient and his son.  They are in agreement.  All questions answered.  He will be scheduled for his next Rituxan on 10/28/24.      SAMY NDIAYE MD    Other Physicians  Dr. Luis Jorgensen ([facility])

## 2024-10-07 ENCOUNTER — OFFICE VISIT (OUTPATIENT)
Dept: HEMATOLOGY/ONCOLOGY | Facility: CLINIC | Age: 89
End: 2024-10-07
Payer: MEDICARE

## 2024-10-07 ENCOUNTER — LAB VISIT (OUTPATIENT)
Dept: LAB | Facility: HOSPITAL | Age: 89
End: 2024-10-07
Attending: INTERNAL MEDICINE
Payer: MEDICARE

## 2024-10-07 VITALS
HEART RATE: 71 BPM | OXYGEN SATURATION: 97 % | DIASTOLIC BLOOD PRESSURE: 76 MMHG | TEMPERATURE: 98 F | RESPIRATION RATE: 16 BRPM | BODY MASS INDEX: 23.4 KG/M2 | HEIGHT: 69 IN | SYSTOLIC BLOOD PRESSURE: 131 MMHG | WEIGHT: 158 LBS

## 2024-10-07 DIAGNOSIS — C82.48 GRADE 3B FOLLICULAR LYMPHOMA OF LYMPH NODES OF MULTIPLE REGIONS: ICD-10-CM

## 2024-10-07 DIAGNOSIS — C82.48 GRADE 3B FOLLICULAR LYMPHOMA OF LYMPH NODES OF MULTIPLE REGIONS: Primary | ICD-10-CM

## 2024-10-07 LAB
ALBUMIN SERPL-MCNC: 3.6 G/DL (ref 3.4–4.8)
ALBUMIN/GLOB SERPL: 1.3 RATIO (ref 1.1–2)
ALP SERPL-CCNC: 78 UNIT/L (ref 40–150)
ALT SERPL-CCNC: 9 UNIT/L (ref 0–55)
ANION GAP SERPL CALC-SCNC: 7 MEQ/L
AST SERPL-CCNC: 12 UNIT/L (ref 5–34)
BASOPHILS # BLD AUTO: 0.05 X10(3)/MCL
BASOPHILS NFR BLD AUTO: 0.8 %
BILIRUB SERPL-MCNC: 0.4 MG/DL
BUN SERPL-MCNC: 15.9 MG/DL (ref 8.4–25.7)
CALCIUM SERPL-MCNC: 8.9 MG/DL (ref 8.8–10)
CHLORIDE SERPL-SCNC: 111 MMOL/L (ref 98–111)
CO2 SERPL-SCNC: 24 MMOL/L (ref 23–31)
CREAT SERPL-MCNC: 0.94 MG/DL (ref 0.73–1.18)
CREAT/UREA NIT SERPL: 17
EOSINOPHIL # BLD AUTO: 0.16 X10(3)/MCL (ref 0–0.9)
EOSINOPHIL NFR BLD AUTO: 2.5 %
ERYTHROCYTE [DISTWIDTH] IN BLOOD BY AUTOMATED COUNT: 14.2 % (ref 11.5–17)
GFR SERPLBLD CREATININE-BSD FMLA CKD-EPI: >60 ML/MIN/1.73/M2
GLOBULIN SER-MCNC: 2.8 GM/DL (ref 2.4–3.5)
GLUCOSE SERPL-MCNC: 92 MG/DL (ref 75–121)
HCT VFR BLD AUTO: 43.9 % (ref 42–52)
HGB BLD-MCNC: 14.5 G/DL (ref 14–18)
IMM GRANULOCYTES # BLD AUTO: 0.02 X10(3)/MCL (ref 0–0.04)
IMM GRANULOCYTES NFR BLD AUTO: 0.3 %
LDH SERPL-CCNC: 172 U/L (ref 125–220)
LYMPHOCYTES # BLD AUTO: 1.37 X10(3)/MCL (ref 0.6–4.6)
LYMPHOCYTES NFR BLD AUTO: 21 %
MCH RBC QN AUTO: 30.8 PG (ref 27–31)
MCHC RBC AUTO-ENTMCNC: 33 G/DL (ref 33–36)
MCV RBC AUTO: 93.2 FL (ref 80–94)
MONOCYTES # BLD AUTO: 0.75 X10(3)/MCL (ref 0.1–1.3)
MONOCYTES NFR BLD AUTO: 11.5 %
NEUTROPHILS # BLD AUTO: 4.17 X10(3)/MCL (ref 2.1–9.2)
NEUTROPHILS NFR BLD AUTO: 63.9 %
PLATELET # BLD AUTO: 224 X10(3)/MCL (ref 130–400)
PMV BLD AUTO: 8.9 FL (ref 7.4–10.4)
POTASSIUM SERPL-SCNC: 4.5 MMOL/L (ref 3.5–5.1)
PROT SERPL-MCNC: 6.4 GM/DL (ref 5.8–7.6)
RBC # BLD AUTO: 4.71 X10(6)/MCL (ref 4.7–6.1)
SODIUM SERPL-SCNC: 142 MMOL/L (ref 136–145)
WBC # BLD AUTO: 6.52 X10(3)/MCL (ref 4.5–11.5)

## 2024-10-07 PROCEDURE — 99214 OFFICE O/P EST MOD 30 MIN: CPT | Mod: PBBFAC | Performed by: INTERNAL MEDICINE

## 2024-10-07 PROCEDURE — 36415 COLL VENOUS BLD VENIPUNCTURE: CPT

## 2024-10-07 PROCEDURE — 99999 PR PBB SHADOW E&M-EST. PATIENT-LVL IV: CPT | Mod: PBBFAC,,, | Performed by: INTERNAL MEDICINE

## 2024-10-07 PROCEDURE — 85025 COMPLETE CBC W/AUTO DIFF WBC: CPT

## 2024-10-07 PROCEDURE — 83615 LACTATE (LD) (LDH) ENZYME: CPT

## 2024-10-07 PROCEDURE — 80053 COMPREHEN METABOLIC PANEL: CPT

## 2024-10-16 ENCOUNTER — TELEPHONE (OUTPATIENT)
Dept: PRIMARY CARE CLINIC | Facility: CLINIC | Age: 89
End: 2024-10-16
Payer: MEDICARE

## 2024-10-16 NOTE — TELEPHONE ENCOUNTER
----- Message from [employer] sent at 10/16/2024  2:24 PM CDT -----  Regarding: med advice  Who Called: Blaine Elmore Sr.    Caller is requesting assistance/information from provider's office.    Symptoms (please be specific):    How long has patient had these symptoms:    List of preferred pharmacies on file (remove unneeded): [unfilled]  If different, enter pharmacy into here including location and phone number:       Preferred Method of Contact: Phone Call  Patient's Preferred Phone Number on File: [phone]   Best Call Back Number, if different:  Additional Information: pt is requesting a call back, states he's fighting depressions. Would like to know if he can resume taking Zoloft

## 2024-10-16 NOTE — TELEPHONE ENCOUNTER
Called pt and confirmed he is having feelings or sadness due to having to stay inside. Pt stated he will start back on the Zoloft and wanted to make sure it was alright if he started back on it.

## 2024-10-26 DIAGNOSIS — C82.48 GRADE 3B FOLLICULAR LYMPHOMA OF LYMPH NODES OF MULTIPLE REGIONS: Primary | ICD-10-CM

## 2024-10-26 RX ORDER — DIPHENHYDRAMINE HCL 25 MG
25 CAPSULE ORAL
Status: CANCELLED | OUTPATIENT
Start: 2024-10-28

## 2024-10-26 RX ORDER — EPINEPHRINE 0.3 MG/.3ML
0.3 INJECTION SUBCUTANEOUS ONCE AS NEEDED
Status: CANCELLED | OUTPATIENT
Start: 2024-10-28

## 2024-10-26 RX ORDER — SODIUM CHLORIDE 0.9 % (FLUSH) 0.9 %
10 SYRINGE (ML) INJECTION
Status: CANCELLED | OUTPATIENT
Start: 2024-10-28

## 2024-10-26 RX ORDER — DIPHENHYDRAMINE HYDROCHLORIDE 50 MG/ML
50 INJECTION INTRAMUSCULAR; INTRAVENOUS ONCE AS NEEDED
Status: CANCELLED | OUTPATIENT
Start: 2024-10-28

## 2024-10-26 RX ORDER — ACETAMINOPHEN 325 MG/1
650 TABLET ORAL
Status: CANCELLED | OUTPATIENT
Start: 2024-10-28

## 2024-10-26 RX ORDER — FAMOTIDINE 20 MG/1
20 TABLET, FILM COATED ORAL
Status: CANCELLED | OUTPATIENT
Start: 2024-10-28

## 2024-10-26 RX ORDER — PROCHLORPERAZINE EDISYLATE 5 MG/ML
5 INJECTION INTRAMUSCULAR; INTRAVENOUS ONCE AS NEEDED
Status: CANCELLED | OUTPATIENT
Start: 2024-10-28

## 2024-10-26 RX ORDER — HEPARIN 100 UNIT/ML
500 SYRINGE INTRAVENOUS
Status: CANCELLED | OUTPATIENT
Start: 2024-10-28

## 2024-10-28 ENCOUNTER — TELEPHONE (OUTPATIENT)
Dept: PRIMARY CARE CLINIC | Facility: CLINIC | Age: 89
End: 2024-10-28
Payer: MEDICARE

## 2024-10-28 ENCOUNTER — LAB VISIT (OUTPATIENT)
Dept: LAB | Facility: HOSPITAL | Age: 89
End: 2024-10-28
Attending: INTERNAL MEDICINE
Payer: MEDICARE

## 2024-10-28 ENCOUNTER — INFUSION (OUTPATIENT)
Dept: INFUSION THERAPY | Facility: HOSPITAL | Age: 89
End: 2024-10-28
Attending: INTERNAL MEDICINE
Payer: MEDICARE

## 2024-10-28 VITALS — DIASTOLIC BLOOD PRESSURE: 77 MMHG | SYSTOLIC BLOOD PRESSURE: 132 MMHG

## 2024-10-28 DIAGNOSIS — C82.48 GRADE 3B FOLLICULAR LYMPHOMA OF LYMPH NODES OF MULTIPLE REGIONS: ICD-10-CM

## 2024-10-28 DIAGNOSIS — C82.48 GRADE 3B FOLLICULAR LYMPHOMA OF LYMPH NODES OF MULTIPLE REGIONS: Primary | ICD-10-CM

## 2024-10-28 LAB
ALBUMIN SERPL-MCNC: 3.3 G/DL (ref 3.4–4.8)
ALBUMIN/GLOB SERPL: 1.1 RATIO (ref 1.1–2)
ALP SERPL-CCNC: 78 UNIT/L (ref 40–150)
ALT SERPL-CCNC: 12 UNIT/L (ref 0–55)
ANION GAP SERPL CALC-SCNC: 8 MEQ/L
AST SERPL-CCNC: 13 UNIT/L (ref 5–34)
BASOPHILS # BLD AUTO: 0.06 X10(3)/MCL
BASOPHILS NFR BLD AUTO: 1 %
BILIRUB SERPL-MCNC: 0.4 MG/DL
BUN SERPL-MCNC: 14.6 MG/DL (ref 8.4–25.7)
CALCIUM SERPL-MCNC: 9 MG/DL (ref 8.8–10)
CHLORIDE SERPL-SCNC: 108 MMOL/L (ref 98–111)
CO2 SERPL-SCNC: 24 MMOL/L (ref 23–31)
CREAT SERPL-MCNC: 0.88 MG/DL (ref 0.72–1.25)
CREAT/UREA NIT SERPL: 17
EOSINOPHIL # BLD AUTO: 0.15 X10(3)/MCL (ref 0–0.9)
EOSINOPHIL NFR BLD AUTO: 2.4 %
ERYTHROCYTE [DISTWIDTH] IN BLOOD BY AUTOMATED COUNT: 14.1 % (ref 11.5–17)
GFR SERPLBLD CREATININE-BSD FMLA CKD-EPI: >60 ML/MIN/1.73/M2
GLOBULIN SER-MCNC: 3.1 GM/DL (ref 2.4–3.5)
GLUCOSE SERPL-MCNC: 121 MG/DL (ref 75–121)
HCT VFR BLD AUTO: 42.3 % (ref 42–52)
HGB BLD-MCNC: 13.9 G/DL (ref 14–18)
IMM GRANULOCYTES # BLD AUTO: 0.01 X10(3)/MCL (ref 0–0.04)
IMM GRANULOCYTES NFR BLD AUTO: 0.2 %
LYMPHOCYTES # BLD AUTO: 1.55 X10(3)/MCL (ref 0.6–4.6)
LYMPHOCYTES NFR BLD AUTO: 25 %
MCH RBC QN AUTO: 30.8 PG (ref 27–31)
MCHC RBC AUTO-ENTMCNC: 32.9 G/DL (ref 33–36)
MCV RBC AUTO: 93.8 FL (ref 80–94)
MONOCYTES # BLD AUTO: 0.67 X10(3)/MCL (ref 0.1–1.3)
MONOCYTES NFR BLD AUTO: 10.8 %
NEUTROPHILS # BLD AUTO: 3.75 X10(3)/MCL (ref 2.1–9.2)
NEUTROPHILS NFR BLD AUTO: 60.6 %
PLATELET # BLD AUTO: 228 X10(3)/MCL (ref 130–400)
PMV BLD AUTO: 9 FL (ref 7.4–10.4)
POTASSIUM SERPL-SCNC: 4.4 MMOL/L (ref 3.5–5.1)
PROT SERPL-MCNC: 6.4 GM/DL (ref 5.8–7.6)
RBC # BLD AUTO: 4.51 X10(6)/MCL (ref 4.7–6.1)
SODIUM SERPL-SCNC: 140 MMOL/L (ref 136–145)
WBC # BLD AUTO: 6.19 X10(3)/MCL (ref 4.5–11.5)

## 2024-10-28 PROCEDURE — 80053 COMPREHEN METABOLIC PANEL: CPT

## 2024-10-28 PROCEDURE — 96401 CHEMO ANTI-NEOPL SQ/IM: CPT

## 2024-10-28 PROCEDURE — 85025 COMPLETE CBC W/AUTO DIFF WBC: CPT

## 2024-10-28 PROCEDURE — 36415 COLL VENOUS BLD VENIPUNCTURE: CPT

## 2024-10-28 PROCEDURE — 63600175 PHARM REV CODE 636 W HCPCS: Mod: JZ,JG | Performed by: INTERNAL MEDICINE

## 2024-10-28 PROCEDURE — 25000003 PHARM REV CODE 250: Performed by: INTERNAL MEDICINE

## 2024-10-28 RX ORDER — MEPERIDINE HYDROCHLORIDE 25 MG/ML
25 INJECTION INTRAMUSCULAR; INTRAVENOUS; SUBCUTANEOUS
Status: DISCONTINUED | OUTPATIENT
Start: 2024-10-28 | End: 2024-10-28 | Stop reason: HOSPADM

## 2024-10-28 RX ORDER — DIPHENHYDRAMINE HYDROCHLORIDE 50 MG/ML
50 INJECTION INTRAMUSCULAR; INTRAVENOUS ONCE AS NEEDED
Status: DISCONTINUED | OUTPATIENT
Start: 2024-10-28 | End: 2024-10-28 | Stop reason: HOSPADM

## 2024-10-28 RX ORDER — PROCHLORPERAZINE EDISYLATE 5 MG/ML
5 INJECTION INTRAMUSCULAR; INTRAVENOUS ONCE AS NEEDED
Status: DISCONTINUED | OUTPATIENT
Start: 2024-10-28 | End: 2024-10-28 | Stop reason: HOSPADM

## 2024-10-28 RX ORDER — HEPARIN 100 UNIT/ML
500 SYRINGE INTRAVENOUS
Status: DISCONTINUED | OUTPATIENT
Start: 2024-10-28 | End: 2024-10-28 | Stop reason: HOSPADM

## 2024-10-28 RX ORDER — SODIUM CHLORIDE 0.9 % (FLUSH) 0.9 %
10 SYRINGE (ML) INJECTION
Status: DISCONTINUED | OUTPATIENT
Start: 2024-10-28 | End: 2024-10-28 | Stop reason: HOSPADM

## 2024-10-28 RX ORDER — EPINEPHRINE 0.3 MG/.3ML
0.3 INJECTION SUBCUTANEOUS ONCE AS NEEDED
Status: DISCONTINUED | OUTPATIENT
Start: 2024-10-28 | End: 2024-10-28 | Stop reason: HOSPADM

## 2024-10-28 RX ORDER — ACETAMINOPHEN 325 MG/1
650 TABLET ORAL
Status: COMPLETED | OUTPATIENT
Start: 2024-10-28 | End: 2024-10-28

## 2024-10-28 RX ORDER — FAMOTIDINE 20 MG/1
20 TABLET, FILM COATED ORAL
Status: COMPLETED | OUTPATIENT
Start: 2024-10-28 | End: 2024-10-28

## 2024-10-28 RX ORDER — DIPHENHYDRAMINE HCL 25 MG
25 CAPSULE ORAL
Status: COMPLETED | OUTPATIENT
Start: 2024-10-28 | End: 2024-10-28

## 2024-10-28 RX ADMIN — DIPHENHYDRAMINE HYDROCHLORIDE 25 MG: 25 CAPSULE ORAL at 08:10

## 2024-10-28 RX ADMIN — RITUXIMAB AND HYALURONIDASE 1400 MG: 120; 2000 INJECTION, SOLUTION SUBCUTANEOUS at 08:10

## 2024-10-28 RX ADMIN — FAMOTIDINE 20 MG: 20 TABLET, FILM COATED ORAL at 08:10

## 2024-10-28 RX ADMIN — ACETAMINOPHEN 650 MG: 325 TABLET ORAL at 08:10

## 2024-10-30 ENCOUNTER — TELEPHONE (OUTPATIENT)
Dept: PRIMARY CARE CLINIC | Facility: CLINIC | Age: 89
End: 2024-10-30
Payer: MEDICARE

## 2024-12-04 NOTE — PROGRESS NOTES
Subjective:       Patient ID: Blaine Elmore Sr. is a 89 y.o. male.    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 Colonoscopy 9/11/17 showed a malignant-appearing mass in the cecum. Biopsy was positive for moderately differentiated adenocarcinoma. Preoperative CEA level was 3.5 ng/mL. CT scans of the chest, abdomen and pelvis 9/12/17 showed severe hepatic steatosis and hypodense lesions compatible with cysts. There was a 1.9 cm lytic appearing lesion in the right ischium and 3-4 mm bilateral pulmonary nodules. Bone scan 9/18/17 showed uptake in the proximal left tibia but no abnormal activity in the pelvis. Plain films showed no significant abnormalities. He underwent a right hemicolectomy 9/22/17. Final pathology revealed a 3.7 cm moderately differentiated adenocarcinoma extending into but not through the muscularis propria. Resection margins were clear. 2 out of 24 pericolonic lymph nodes were positive. He presented to [facility] for a treatment opinion. He was staged as IIIA. Recommendations were for adjuvant treatment with CAPOX for 3 months versus oral Xeloda for 6 months. He remained weak postoperatively, and decided he did not want to consider infusional chemotherapy.    [facility] to establish local Oncology care.  Treatment options were reviewed and discussed again.  He opted for therapy with oral Xeloda for 6 months as tolerated. Laboratory showed evidence of iron deficiency anemia which was treated with IV Injectafer x 2 doses with recovery of his blood counts.  He  initiated Xeloda 11/27/17 and completed 6 months of therapy 5/18 without significant side effects. Follow-up CT scans of the chest, abdomen and pelvis 5/30/18 showed stable hypoattenuating liver lesions bilaterally, largest on the right measured 14 mm. There were stable on an outside CT scan and felt to represent cysts. There were also small, nonspecific pulmonary nodules, none measuring more than 4 mm that were also stable compared to his prior films. Follow-up CT scans 9/27/18 showed similar findings. There was also a stable lytic lesion in the right ischial tuberosity and right L1 pedicle and a mixed lytic/sclerotic focus in the right fourth rib. He had no associated symptoms. CT PET scan 2/5/19 showed unchanged small punctate pulmonary nodules with no significant hypermetabolic activity. 14 mm right hepatic lobe hypodensity showed no significant glucose uptake. The lucent lesions described in the right ilium, L1 and right fourth rib showed no glucose uptake. He has had no evidence of disease recurrence on follow-up and additional surveillance imaging.    He presented to his PCP 7/11/24 complaining of 15 lb weight loss over several weeks, weakness, fatigue and a nonproductive cough.  He denied any fever, chills or night sweats.   CXR 7/11/24:  Small right pleural effusion and widened mediastinum.  CT Chest 7/18/24:  Bulky confluent mediastinal adenopathy and left axillary lymph node measuring 1.7 x 1.1 cm with a partially imaged soniya mass in the abdomen encasing the superior mesenteric artery.  CT-PET 7/23/24:  Widespread hypermetabolic adenopathy involving the lower neck, mediastinum and abdomen.  Large conglomerate soniya mass in the midabdomen surrounding the aorta measuring up to 14 x 12 cm with a maximum SUV of 13.  No obvious sites of extranodal disease.  Left axillary LN biopsy 7/25/24:  Follicular lymphoma, grade 3b    Due to his advanced age, treatment was recommended with single agent Rituximab 375  mg/m2 weekly for 4 weeks. Hepatitis-B serology was negative.  Baseline LDH and uric acid levels were normal.  He started treatment on 8/5/24.  He received allopurinol for tumor lysis prophylaxis.  He completed 4 weeks of therapy 8/26/24.    CT C/A/P 10/04/24:  Decreased size of all areas of adenopathy.  Right paratracheal 3.4 x 2.9 cm (previous 4.3 x 3.6 cm). No significant axillary or supraclavicular adenopathy.  Decreased right pleural effusion.  Confluent retroperitoneal soniya mass approximately 8 cm AP (previous 10.6 cm).      Interval History  He returns to clinic today by himself for an 8 week follow-up visit.  He continues to feel well.  He reports no fever, chills, night sweats or weight loss.  He has no abdominal symptoms or complaints.  No changes in his bowel habits.  He has no appreciable adenopathy on his clinical exam.      Review of Systems   Constitutional:  Negative for appetite change, fatigue, fever and unexpected weight change.   HENT:  Negative for mouth sores, sore throat and trouble swallowing.    Eyes: Negative.    Respiratory:  Negative for cough, shortness of breath and wheezing.    Cardiovascular:  Negative for chest pain, palpitations and leg swelling.   Gastrointestinal:  Negative for abdominal distention, abdominal pain, constipation, diarrhea and nausea.   Genitourinary:  Positive for frequency. Negative for dysuria, flank pain, hematuria and urgency.   Musculoskeletal:  Negative for arthralgias and back pain.   Integumentary:  Negative for pallor and rash.   Neurological:  Positive for weakness (Generalized, nonfocal). Negative for dizziness, numbness and headaches.   Hematological:  Negative for adenopathy. Does not bruise/bleed easily.   Psychiatric/Behavioral:  Positive for sleep disturbance (Chronic). Negative for confusion and dysphoric mood. The patient is not nervous/anxious.         Bipolar disorder - stable       PMHx:  CAD, HTN, hypercholesterolemia, arthritis, diabetes  "mellitus with neuropathy, bipolar disorder, BPH, cataracts, melanoma right back 2005, anemia  PSHx:  Right hemicolectomy 9/17, colonoscopy, tonsillectomy, melanoma resection right lower back, Mediport, excision left arm lesion  SH:  No cigarettes, + smokeless tobacco, quit 2014.  FH:  His father had multiple myeloma.     Objective:        /70 (Patient Position: Sitting)   Pulse 65   Temp 97.8 °F (36.6 °C)   Resp 15   Ht 5' 9" (1.753 m)   Wt 73 kg (161 lb)   SpO2 97%   BMI 23.78 kg/m²    Physical Exam  Constitutional:       Comments: Elderly white male in NAD, ambulatory with a cane   HENT:      Head: Normocephalic.      Mouth/Throat:      Mouth: Mucous membranes are moist.      Pharynx: Oropharynx is clear. No posterior oropharyngeal erythema.   Eyes:      General: No scleral icterus.     Extraocular Movements: Extraocular movements intact.      Pupils: Pupils are equal, round, and reactive to light.   Cardiovascular:      Rate and Rhythm: Normal rate and regular rhythm.      Heart sounds: No murmur heard.  Pulmonary:      Comments: Lungs clear to auscultation  Abdominal:      General: Bowel sounds are normal.      Palpations: Abdomen is soft.      Comments: Well-healed laparoscopic incision sites.  Slightly distended, but nontender.  No palpable fullness, mass or splenomegaly.   Musculoskeletal:         General: No swelling or tenderness.      Cervical back: Neck supple. No tenderness.   Lymphadenopathy:      Comments: Healing left axillary incision.  No palpable peripheral lymphadenopathy.   Skin:     General: Skin is warm and dry.      Findings: No rash.      Comments: MediPort removal incision right chest wall.   Neurological:      General: No focal deficit present.      Mental Status: He is alert and oriented to person, place, and time.      Cranial Nerves: No cranial nerve deficit.      Motor: No weakness.       ECOG SCORE    1 - Restricted in strenuous activity-ambulatory and able to carry out " work of a [employer]  Recent Results (from the past week)   CBC with Differential    Collection Time: 12/18/24  8:48 AM   Result Value Ref Range    WBC 8.33 4.50 - 11.50 x10(3)/mcL    RBC 4.56 (L) 4.70 - 6.10 x10(6)/mcL    Hgb 14.1 14.0 - 18.0 g/dL    Hct 42.5 42.0 - 52.0 %    MCV 93.2 80.0 - 94.0 fL    MCH 30.9 27.0 - 31.0 pg    MCHC 33.2 33.0 - 36.0 g/dL    RDW 12.8 11.5 - 17.0 %    Platelet 221 130 - 400 x10(3)/mcL    MPV 8.8 7.4 - 10.4 fL    Neut % 75.9 %    Lymph % 12.1 %    Mono % 9.1 %    Eos % 2.2 %    Basophil % 0.5 %    Lymph # 1.01 0.6 - 4.6 x10(3)/mcL    Neut # 6.32 2.1 - 9.2 x10(3)/mcL    Mono # 0.76 0.1 - 1.3 x10(3)/mcL    Eos # 0.18 0 - 0.9 x10(3)/mcL    Baso # 0.04 <=0.2 x10(3)/mcL    IG# 0.02 0 - 0.04 x10(3)/mcL    IG% 0.2 %           Assessment:   Stage IIIA follicular NHL - grade 3b  Stage III colon cancer 9/17 - REGINALD      Plan:   Patient continues to do well with no clinical findings suspicious for disease progression.  Continue maintenance Rituximab every 8 weeks.  Next infusion scheduled for 12/23/24.  RTC in 8 weeks for a follow-up visit with CT scans of the chest, abdomen and pelvis.      SAMY NDIAYE MD    Other Physicians  Dr. Luis Jorgensen ([facility])

## 2024-12-18 ENCOUNTER — OFFICE VISIT (OUTPATIENT)
Dept: HEMATOLOGY/ONCOLOGY | Facility: CLINIC | Age: 89
End: 2024-12-18
Payer: MEDICARE

## 2024-12-18 ENCOUNTER — LAB VISIT (OUTPATIENT)
Dept: LAB | Facility: HOSPITAL | Age: 89
End: 2024-12-18
Attending: INTERNAL MEDICINE
Payer: MEDICARE

## 2024-12-18 VITALS
SYSTOLIC BLOOD PRESSURE: 133 MMHG | HEART RATE: 65 BPM | OXYGEN SATURATION: 97 % | RESPIRATION RATE: 15 BRPM | BODY MASS INDEX: 23.85 KG/M2 | HEIGHT: 69 IN | WEIGHT: 161 LBS | DIASTOLIC BLOOD PRESSURE: 70 MMHG | TEMPERATURE: 98 F

## 2024-12-18 DIAGNOSIS — Z85.038 PERSONAL HISTORY OF COLON CANCER: ICD-10-CM

## 2024-12-18 DIAGNOSIS — C82.48 GRADE 3B FOLLICULAR LYMPHOMA OF LYMPH NODES OF MULTIPLE REGIONS: Primary | ICD-10-CM

## 2024-12-18 DIAGNOSIS — C82.48 GRADE 3B FOLLICULAR LYMPHOMA OF LYMPH NODES OF MULTIPLE REGIONS: ICD-10-CM

## 2024-12-18 LAB
ALBUMIN SERPL-MCNC: 3.2 G/DL (ref 3.4–4.8)
ALBUMIN/GLOB SERPL: 1.1 RATIO (ref 1.1–2)
ALP SERPL-CCNC: 90 UNIT/L (ref 40–150)
ALT SERPL-CCNC: 12 UNIT/L (ref 0–55)
ANION GAP SERPL CALC-SCNC: 6 MEQ/L
AST SERPL-CCNC: 14 UNIT/L (ref 5–34)
BASOPHILS # BLD AUTO: 0.04 X10(3)/MCL
BASOPHILS NFR BLD AUTO: 0.5 %
BILIRUB SERPL-MCNC: 0.3 MG/DL
BUN SERPL-MCNC: 16.6 MG/DL (ref 8.4–25.7)
CALCIUM SERPL-MCNC: 8.4 MG/DL (ref 8.8–10)
CHLORIDE SERPL-SCNC: 108 MMOL/L (ref 98–111)
CO2 SERPL-SCNC: 26 MMOL/L (ref 23–31)
CREAT SERPL-MCNC: 0.82 MG/DL (ref 0.72–1.25)
CREAT/UREA NIT SERPL: 20
EOSINOPHIL # BLD AUTO: 0.18 X10(3)/MCL (ref 0–0.9)
EOSINOPHIL NFR BLD AUTO: 2.2 %
ERYTHROCYTE [DISTWIDTH] IN BLOOD BY AUTOMATED COUNT: 12.8 % (ref 11.5–17)
GFR SERPLBLD CREATININE-BSD FMLA CKD-EPI: >60 ML/MIN/1.73/M2
GLOBULIN SER-MCNC: 2.9 GM/DL (ref 2.4–3.5)
GLUCOSE SERPL-MCNC: 100 MG/DL (ref 75–121)
HCT VFR BLD AUTO: 42.5 % (ref 42–52)
HGB BLD-MCNC: 14.1 G/DL (ref 14–18)
IMM GRANULOCYTES # BLD AUTO: 0.02 X10(3)/MCL (ref 0–0.04)
IMM GRANULOCYTES NFR BLD AUTO: 0.2 %
LDH SERPL-CCNC: 285 U/L (ref 125–220)
LYMPHOCYTES # BLD AUTO: 1.01 X10(3)/MCL (ref 0.6–4.6)
LYMPHOCYTES NFR BLD AUTO: 12.1 %
MCH RBC QN AUTO: 30.9 PG (ref 27–31)
MCHC RBC AUTO-ENTMCNC: 33.2 G/DL (ref 33–36)
MCV RBC AUTO: 93.2 FL (ref 80–94)
MONOCYTES # BLD AUTO: 0.76 X10(3)/MCL (ref 0.1–1.3)
MONOCYTES NFR BLD AUTO: 9.1 %
NEUTROPHILS # BLD AUTO: 6.32 X10(3)/MCL (ref 2.1–9.2)
NEUTROPHILS NFR BLD AUTO: 75.9 %
PLATELET # BLD AUTO: 221 X10(3)/MCL (ref 130–400)
PMV BLD AUTO: 8.8 FL (ref 7.4–10.4)
POTASSIUM SERPL-SCNC: 5 MMOL/L (ref 3.5–5.1)
PROT SERPL-MCNC: 6.1 GM/DL (ref 5.8–7.6)
RBC # BLD AUTO: 4.56 X10(6)/MCL (ref 4.7–6.1)
SODIUM SERPL-SCNC: 140 MMOL/L (ref 136–145)
WBC # BLD AUTO: 8.33 X10(3)/MCL (ref 4.5–11.5)

## 2024-12-18 PROCEDURE — 36415 COLL VENOUS BLD VENIPUNCTURE: CPT

## 2024-12-18 PROCEDURE — 99214 OFFICE O/P EST MOD 30 MIN: CPT | Mod: PBBFAC | Performed by: INTERNAL MEDICINE

## 2024-12-18 PROCEDURE — 80053 COMPREHEN METABOLIC PANEL: CPT

## 2024-12-18 PROCEDURE — 99999 PR PBB SHADOW E&M-EST. PATIENT-LVL IV: CPT | Mod: PBBFAC,,, | Performed by: INTERNAL MEDICINE

## 2024-12-18 PROCEDURE — 85025 COMPLETE CBC W/AUTO DIFF WBC: CPT

## 2024-12-18 PROCEDURE — 83615 LACTATE (LD) (LDH) ENZYME: CPT

## 2024-12-18 PROCEDURE — 99214 OFFICE O/P EST MOD 30 MIN: CPT | Mod: S$PBB,,, | Performed by: INTERNAL MEDICINE

## 2024-12-20 RX ORDER — DIPHENHYDRAMINE HCL 25 MG
25 CAPSULE ORAL
OUTPATIENT
Start: 2024-12-23

## 2024-12-20 RX ORDER — FAMOTIDINE 20 MG/1
20 TABLET, FILM COATED ORAL
OUTPATIENT
Start: 2024-12-23

## 2024-12-20 RX ORDER — SODIUM CHLORIDE 0.9 % (FLUSH) 0.9 %
10 SYRINGE (ML) INJECTION
OUTPATIENT
Start: 2024-12-23

## 2024-12-20 RX ORDER — DIPHENHYDRAMINE HYDROCHLORIDE 50 MG/ML
50 INJECTION INTRAMUSCULAR; INTRAVENOUS ONCE AS NEEDED
OUTPATIENT
Start: 2024-12-23

## 2024-12-20 RX ORDER — PROCHLORPERAZINE EDISYLATE 5 MG/ML
5 INJECTION INTRAMUSCULAR; INTRAVENOUS ONCE AS NEEDED
OUTPATIENT
Start: 2024-12-23

## 2024-12-20 RX ORDER — ACETAMINOPHEN 325 MG/1
650 TABLET ORAL
OUTPATIENT
Start: 2024-12-23

## 2024-12-20 RX ORDER — EPINEPHRINE 0.3 MG/.3ML
0.3 INJECTION SUBCUTANEOUS ONCE AS NEEDED
OUTPATIENT
Start: 2024-12-23

## 2024-12-20 RX ORDER — HEPARIN 100 UNIT/ML
500 SYRINGE INTRAVENOUS
OUTPATIENT
Start: 2024-12-23

## 2024-12-23 ENCOUNTER — INFUSION (OUTPATIENT)
Dept: INFUSION THERAPY | Facility: HOSPITAL | Age: 89
End: 2024-12-23
Attending: INTERNAL MEDICINE
Payer: MEDICARE

## 2024-12-23 VITALS
DIASTOLIC BLOOD PRESSURE: 66 MMHG | HEIGHT: 69 IN | HEART RATE: 51 BPM | BODY MASS INDEX: 23.84 KG/M2 | WEIGHT: 160.94 LBS | TEMPERATURE: 97 F | SYSTOLIC BLOOD PRESSURE: 136 MMHG

## 2024-12-23 DIAGNOSIS — C82.48 GRADE 3B FOLLICULAR LYMPHOMA OF LYMPH NODES OF MULTIPLE REGIONS: Primary | ICD-10-CM

## 2024-12-23 PROCEDURE — 63600175 PHARM REV CODE 636 W HCPCS: Mod: JZ,JG | Performed by: NURSE PRACTITIONER

## 2024-12-23 PROCEDURE — 96401 CHEMO ANTI-NEOPL SQ/IM: CPT

## 2024-12-23 PROCEDURE — 25000003 PHARM REV CODE 250: Performed by: NURSE PRACTITIONER

## 2024-12-23 RX ORDER — DIPHENHYDRAMINE HYDROCHLORIDE 50 MG/ML
50 INJECTION INTRAMUSCULAR; INTRAVENOUS ONCE AS NEEDED
Status: DISCONTINUED | OUTPATIENT
Start: 2024-12-23 | End: 2024-12-23 | Stop reason: HOSPADM

## 2024-12-23 RX ORDER — SODIUM CHLORIDE 0.9 % (FLUSH) 0.9 %
10 SYRINGE (ML) INJECTION
Status: DISCONTINUED | OUTPATIENT
Start: 2024-12-23 | End: 2024-12-23 | Stop reason: HOSPADM

## 2024-12-23 RX ORDER — ACETAMINOPHEN 325 MG/1
650 TABLET ORAL
Status: COMPLETED | OUTPATIENT
Start: 2024-12-23 | End: 2024-12-23

## 2024-12-23 RX ORDER — PROCHLORPERAZINE EDISYLATE 5 MG/ML
5 INJECTION INTRAMUSCULAR; INTRAVENOUS ONCE AS NEEDED
Status: DISCONTINUED | OUTPATIENT
Start: 2024-12-23 | End: 2024-12-23 | Stop reason: HOSPADM

## 2024-12-23 RX ORDER — DIPHENHYDRAMINE HCL 25 MG
25 CAPSULE ORAL
Status: COMPLETED | OUTPATIENT
Start: 2024-12-23 | End: 2024-12-23

## 2024-12-23 RX ORDER — EPINEPHRINE 0.3 MG/.3ML
0.3 INJECTION SUBCUTANEOUS ONCE AS NEEDED
Status: DISCONTINUED | OUTPATIENT
Start: 2024-12-23 | End: 2024-12-23 | Stop reason: HOSPADM

## 2024-12-23 RX ORDER — MEPERIDINE HYDROCHLORIDE 25 MG/ML
25 INJECTION INTRAMUSCULAR; INTRAVENOUS; SUBCUTANEOUS
Status: DISCONTINUED | OUTPATIENT
Start: 2024-12-23 | End: 2024-12-23 | Stop reason: HOSPADM

## 2024-12-23 RX ORDER — HEPARIN 100 UNIT/ML
500 SYRINGE INTRAVENOUS
Status: DISCONTINUED | OUTPATIENT
Start: 2024-12-23 | End: 2024-12-23 | Stop reason: HOSPADM

## 2024-12-23 RX ORDER — FAMOTIDINE 20 MG/1
20 TABLET, FILM COATED ORAL
Status: COMPLETED | OUTPATIENT
Start: 2024-12-23 | End: 2024-12-23

## 2024-12-23 RX ADMIN — ACETAMINOPHEN 650 MG: 325 TABLET ORAL at 08:12

## 2024-12-23 RX ADMIN — RITUXIMAB AND HYALURONIDASE 1400 MG: 120; 2000 INJECTION, SOLUTION SUBCUTANEOUS at 08:12

## 2024-12-23 RX ADMIN — DIPHENHYDRAMINE HYDROCHLORIDE 25 MG: 25 CAPSULE ORAL at 08:12

## 2024-12-23 RX ADMIN — FAMOTIDINE 20 MG: 20 TABLET, FILM COATED ORAL at 08:12

## 2025-01-13 DIAGNOSIS — F51.01 PRIMARY INSOMNIA: Primary | ICD-10-CM

## 2025-01-13 PROBLEM — R63.4 UNEXPLAINED WEIGHT LOSS: Chronic | Status: RESOLVED | Noted: 2024-07-10 | Resolved: 2025-01-13

## 2025-01-13 PROBLEM — I71.21 ANEURYSM OF ASCENDING AORTA WITHOUT RUPTURE: Chronic | Status: ACTIVE | Noted: 2025-01-13

## 2025-01-13 PROBLEM — R93.89 ABNORMAL CHEST X-RAY: Status: RESOLVED | Noted: 2024-07-16 | Resolved: 2025-01-13

## 2025-01-13 PROBLEM — C82.48: Chronic | Status: ACTIVE | Noted: 2024-08-01

## 2025-01-13 NOTE — PROGRESS NOTES
Subjective:       Patient ID: Blaine Elmore Sr. is a 89 y.o. male.    Chief Complaint: Hypertension (Bipolar affective disorder, currently depressed, moderate/Dyslipidemia,Aneurysm of ascending aorta without rupture,/Elevated PSA, Grade 3b follicular lymphoma of lymph nodes of multiple regions Prediabetes, Primary insomnia)    Patient presents to the clinic today for chronic condition follow-up.  Doing well, no specific complaints, tolerating all medications, reporting no significant side effects or problems.    Last wellness exam was 07/06/2023.    Chronic conditions being treated include but are not limited to primary HTN, prediabetes, dyslipidemia, grade 3B follicular lymphoma, aortic atherosclerosis, thoracic aortic aneurysm.    Overall, he appears to be doing fairly well.  He is tolerating his cancer treatment.  He is having a bit of seasonal depression, he tries to keep busy.  Does not wish to start any medication or change anything at this time.  He does take the Ambien for insomnia, not sleeping through the night, wonders if he could take it more often if necessary.  Refill of this medication was given a few days ago.      Review of Systems   Constitutional: Negative.  Negative for fatigue and fever.   HENT: Negative.  Negative for sore throat and trouble swallowing.    Eyes: Negative.  Negative for redness and visual disturbance.   Respiratory: Negative.  Negative for chest tightness and shortness of breath.    Cardiovascular: Negative.  Negative for chest pain.   Gastrointestinal: Negative.  Negative for abdominal pain, blood in stool and nausea.   Endocrine: Negative.  Negative for cold intolerance, heat intolerance and polyuria.   Genitourinary: Negative.    Musculoskeletal: Negative.  Negative for arthralgias, gait problem and joint swelling.   Integumentary:  Negative for rash. Negative.   Neurological: Negative.  Negative for dizziness, weakness and headaches.   Psychiatric/Behavioral: Negative.   Negative for agitation and dysphoric mood.            Patient Care Team:  Luis Johnson MD as PCP - General (Family Medicine)  Tone Gerber MD as Consulting Physician (Oncology)  Roger Arguelles MD as Consulting Physician (Urology)  Objective:   There were no vitals taken for this visit.     Physical Exam  Constitutional:       Appearance: Normal appearance.   HENT:      Head: Normocephalic.      Mouth/Throat:      Mouth: Mucous membranes are moist.      Pharynx: Oropharynx is clear.   Eyes:      Conjunctiva/sclera: Conjunctivae normal.      Pupils: Pupils are equal, round, and reactive to light.   Cardiovascular:      Rate and Rhythm: Normal rate and regular rhythm.      Heart sounds: Normal heart sounds.   Pulmonary:      Effort: Pulmonary effort is normal.      Breath sounds: Normal breath sounds.   Abdominal:      General: Abdomen is flat.      Palpations: Abdomen is soft.   Musculoskeletal:         General: Normal range of motion.      Cervical back: Neck supple.   Skin:     General: Skin is warm and dry.   Neurological:      General: No focal deficit present.      Mental Status: He is alert and oriented to person, place, and time.   Psychiatric:         Mood and Affect: Mood normal.         Behavior: Behavior normal.         Thought Content: Thought content normal.         Judgment: Judgment normal.           Lab Results   Component Value Date     12/18/2024    K 5.0 12/18/2024     12/18/2024    CO2 26 12/18/2024    BUN 16.6 12/18/2024    CREATININE 0.82 12/18/2024    CALCIUM 8.4 (L) 12/18/2024    ALBUMIN 3.2 (L) 12/18/2024    BILITOT 0.3 12/18/2024    ALKPHOS 90 12/18/2024    AST 14 12/18/2024    ALT 12 12/18/2024    EGFRNORACEVR >60 12/18/2024     Lab Results   Component Value Date    WBC 8.33 12/18/2024    RBC 4.56 (L) 12/18/2024    HGB 14.1 12/18/2024    HCT 42.5 12/18/2024    MCV 93.2 12/18/2024    RDW 12.8 12/18/2024     12/18/2024      Lab Results   Component Value Date     CHOL 216 (H) 07/10/2024    TRIG 102 07/10/2024    HDL 60 07/10/2024    .00 07/10/2024    TOTALCHOLEST 4 07/10/2024     Lab Results   Component Value Date    TSH 0.130 (L) 07/16/2024     Lab Results   Component Value Date    HGBA1C 5.6 07/10/2024        Lab Results   Component Value Date    PSA 7.93 (H) 06/20/2022         Assessment:       ICD-10-CM ICD-9-CM   1. Primary hypertension  I10 401.9   2. Dyslipidemia  E78.5 272.4   3. Prediabetes  R73.03 790.29   4. Grade 3b follicular lymphoma of lymph nodes of multiple regions  C82.48 202.08   5. Bipolar affective disorder, currently depressed, moderate  F31.32 296.52   6. Aneurysm of ascending aorta without rupture  I71.21 441.2   7. Primary insomnia  F51.01 307.42   8. Elevated PSA  R97.20 790.93       Current Outpatient Medications   Medication Instructions    allopurinoL (ZYLOPRIM) 300 mg, Oral, Daily    amLODIPine (NORVASC) 10 mg, Oral, Daily    aspirin (ECOTRIN) 81 mg    finasteride (PROSCAR) 5 mg, Daily    losartan (COZAAR) 100 mg, Oral, Daily    melatonin (MELATIN) 3 mg tablet 1 tablet, Oral, Nightly    sertraline (ZOLOFT) 25 mg, Oral, Daily    tadalafiL (CIALIS) 5 mg    vit A/vit C/vit E/zinc/copper (ICAPS AREDS ORAL) Take by mouth.    vitamin B complex (SUPER B COMPLEX-B-12 ORAL) Take by mouth.    zolpidem (AMBIEN) 5 mg, Oral, Nightly PRN     Plan:     Problem List Items Addressed This Visit          Psychiatric    Bipolar affective disorder, currently depressed, moderate (Chronic)    Overview     Prescribed sertraline 25 mg daily.    Patient reports stability of moods, and effectiveness of medications, including no agitation, significant somnolence, or significant bouts of anxiety or depression.  Patient is not having any sleep disturbance.  Current treatment plan will continue, with plans to discuss any significant changes in patient's status if necessary if anything changes in the future.            Cardiac/Vascular    Primary hypertension - Primary  (Chronic)    Overview     Prescribed losartan 100 mg daily, amlodipine 10 mg daily.    Blood pressures appear to be adequately controlled with current treatment plan, including prescription blood pressure medication.  Medication(s) appear to be effective at current dosages, and are not causing any side effects or problems.  Continue medical management and continue home blood pressure checks.  Average blood pressures at home should be no greater than 130/80.  Patient instructed to contact the clinic if blood pressures become elevated at any point prior to next clinic visit.    Medication will be continued at the current dosage.         Dyslipidemia (Chronic)    Overview     Dyslipidemia is being treated using lifestyle modification only.  Patient is not being prescribed lipid-lowering medication.  As discussed, we will continue to monitor this, and may ultimately choose to prescribe medication if this becomes difficult to control utilizing lifestyle modification only.         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     Diagnosis noted and documented.  We will continue to monitor and discuss this over time, although at the patient's advanced age and with comorbid conditions, it is doubtful that any aggressive management will be considered unless absolutely necessary.            Renal/    Elevated PSA (Chronic)    Overview     Patient sees Urology, Dr. Arguelles.            Oncology    Grade 3b follicular lymphoma of lymph nodes of multiple regions (Chronic)    Overview     From Heme-Onc note, December 2024:    Left axillary LN biopsy 7/25/24:  Follicular lymphoma, grade 3b     Currently being followed by Hematology/Oncology.            Endocrine    Prediabetes (Chronic)    Overview     Patient has prediabetes and is not prescribed medication.  Patient's prediabetes is  treated and controlled adequately using conservative means, specifically lifestyle modification, dietary changes, and/or increase in activity/exercise.         Current Assessment & Plan             Component Ref Range & Units 6 mo ago 1 yr ago 2 yr ago 5 yr ago   Hemoglobin A1c <=7.0 % 5.6 5.6 5.5 6.4 High  R   Estimated Average Glucose mg/dL 114.0 114.0 111.2 137                  Other    Primary insomnia (Chronic)    Overview     Prescribed Ambien 5 mg q.h.s. p.r.n. insomnia, he may take this as much as necessary, every night if necessary.    Ambien started on 02/07/2024.                    Follow up in about 6 months (around 7/14/2025) for Medicare Wellness.    This note was created with the assistance of [employer] voice recognition software or phone dictation. There may be transcription errors as a result of using this technology. Effort has been made to assure accuracy of transcription but any obvious errors or omissions should be clarified with the author of the document.

## 2025-01-13 NOTE — ASSESSMENT & PLAN NOTE
Component Ref Range & Units 6 mo ago 1 yr ago 2 yr ago 5 yr ago   Hemoglobin A1c <=7.0 % 5.6 5.6 5.5 6.4 High  R   Estimated Average Glucose mg/dL 114.0 114.0 111.2 137

## 2025-01-14 ENCOUNTER — OFFICE VISIT (OUTPATIENT)
Dept: PRIMARY CARE CLINIC | Facility: CLINIC | Age: OVER 89
End: 2025-01-14
Payer: MEDICARE

## 2025-01-14 DIAGNOSIS — I71.21 ANEURYSM OF ASCENDING AORTA WITHOUT RUPTURE: Chronic | ICD-10-CM

## 2025-01-14 DIAGNOSIS — F51.01 PRIMARY INSOMNIA: Chronic | ICD-10-CM

## 2025-01-14 DIAGNOSIS — E78.5 DYSLIPIDEMIA: Chronic | ICD-10-CM

## 2025-01-14 DIAGNOSIS — R73.03 PREDIABETES: Chronic | ICD-10-CM

## 2025-01-14 DIAGNOSIS — C82.48 GRADE 3B FOLLICULAR LYMPHOMA OF LYMPH NODES OF MULTIPLE REGIONS: Chronic | ICD-10-CM

## 2025-01-14 DIAGNOSIS — F31.32 BIPOLAR AFFECTIVE DISORDER, CURRENTLY DEPRESSED, MODERATE: Chronic | ICD-10-CM

## 2025-01-14 DIAGNOSIS — I10 PRIMARY HYPERTENSION: Primary | Chronic | ICD-10-CM

## 2025-01-14 DIAGNOSIS — R97.20 ELEVATED PSA: Chronic | ICD-10-CM

## 2025-01-14 PROCEDURE — 99214 OFFICE O/P EST MOD 30 MIN: CPT | Mod: ,,, | Performed by: GENERAL PRACTICE

## 2025-01-14 RX ORDER — TALC
1 POWDER (GRAM) TOPICAL NIGHTLY
COMMUNITY

## 2025-01-14 RX ORDER — ZOLPIDEM TARTRATE 5 MG/1
5 TABLET ORAL NIGHTLY PRN
Qty: 30 TABLET | Refills: 5 | Status: SHIPPED | OUTPATIENT
Start: 2025-01-14 | End: 2025-07-13

## 2025-02-02 NOTE — PROGRESS NOTES
Subjective:       Patient ID: Blaine Elmore Sr. is a 89 y.o. male.    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 Colonoscopy 9/11/17 showed a malignant-appearing mass in the cecum. Biopsy was positive for moderately differentiated adenocarcinoma. Preoperative CEA level was 3.5 ng/mL. CT scans of the chest, abdomen and pelvis 9/12/17 showed severe hepatic steatosis and hypodense lesions compatible with cysts. There was a 1.9 cm lytic appearing lesion in the right ischium and 3-4 mm bilateral pulmonary nodules. Bone scan 9/18/17 showed uptake in the proximal left tibia but no abnormal activity in the pelvis. Plain films showed no significant abnormalities. He underwent a right hemicolectomy 9/22/17. Final pathology revealed a 3.7 cm moderately differentiated adenocarcinoma extending into but not through the muscularis propria. Resection margins were clear. 2 out of 24 pericolonic lymph nodes were positive. He presented to [facility] for a treatment opinion. He was staged as IIIA. Recommendations were for adjuvant treatment with CAPOX for 3 months versus oral Xeloda for 6 months. He remained weak postoperatively, and decided he did not want to consider infusional chemotherapy.    [facility] to establish local Oncology care.  Treatment options were reviewed and discussed again.  He opted for therapy with oral Xeloda for 6 months as tolerated. Laboratory showed evidence of iron deficiency anemia which was treated with IV Injectafer x 2 doses with recovery of his blood counts.  He initiated  Xeloda 11/27/17 and completed 6 months of therapy 5/18 without significant side effects. Follow-up CT scans of the chest, abdomen and pelvis 5/30/18 showed stable hypoattenuating liver lesions bilaterally, largest on the right measured 14 mm. There were stable on an outside CT scan and felt to represent cysts. There were also small, nonspecific pulmonary nodules, none measuring more than 4 mm that were also stable compared to his prior films. Follow-up CT scans 9/27/18 showed similar findings. There was also a stable lytic lesion in the right ischial tuberosity and right L1 pedicle and a mixed lytic/sclerotic focus in the right fourth rib. He had no associated symptoms. CT PET scan 2/5/19 showed unchanged small punctate pulmonary nodules with no significant hypermetabolic activity. 14 mm right hepatic lobe hypodensity showed no significant glucose uptake. The lucent lesions described in the right ilium, L1 and right fourth rib showed no glucose uptake. He has had no evidence of disease recurrence on follow-up and additional surveillance imaging.    He presented to his PCP 7/11/24 complaining of 15 lb weight loss over several weeks, weakness, fatigue and a nonproductive cough.  He denied any fever, chills or night sweats.   CXR 7/11/24:  Small right pleural effusion and widened mediastinum.  CT Chest 7/18/24:  Bulky confluent mediastinal adenopathy and left axillary lymph node measuring 1.7 x 1.1 cm with a partially imaged soniya mass in the abdomen encasing the superior mesenteric artery.  CT-PET 7/23/24:  Widespread hypermetabolic adenopathy involving the lower neck, mediastinum and abdomen.  Large conglomerate soniya mass in the midabdomen surrounding the aorta measuring up to 14 x 12 cm with a maximum SUV of 13.  No obvious sites of extranodal disease.  Left axillary LN biopsy 7/25/24:  Follicular lymphoma, grade 3b    Due to his advanced age, treatment was recommended with single agent Rituximab 375 mg/m2 weekly  for 4 weeks. Hepatitis-B serology was negative.  Baseline LDH and uric acid levels were normal.  He started treatment on 8/5/24.  He received allopurinol for tumor lysis prophylaxis.  He completed 4 weeks of therapy 8/26/24.    CT C/A/P 10/04/24:  Decreased size of all areas of adenopathy.  Right paratracheal 3.4 x 2.9 cm (previous 4.3 x 3.6 cm). No significant axillary or supraclavicular adenopathy.  Decreased right pleural effusion.  Confluent retroperitoneal soniya mass approximately 8 cm AP (previous 10.6 cm).  CT C/A/P 2/07/25:  Disease progression.  Prevascular LN 3.4 x 2.9 cm (previous 2.1 x 2.0 cm), subcarinal LN 2.9 x 2.4 cm and prevascular LN 3.5 x 1.8 cm (previous 2.3 x 1.8 cm).  Left axillary LN 1.7 x 1.2 cm.  Confluent soniya mass involving the retroperitoneum and small bowel mesentery 9.5 cm AP (previous 8 cm).        Interval History  He returns to the office today for an 8 week follow-up visit accompanied by his son.  He is receiving maintenance rituximab every 8 weeks.  He continues to tolerate treatment well.  He has chronic fatigue which is not a new symptom.  His symptoms are not progressive.  He specifically denies fever, night sweats or weight loss.  No abdominal pain, nausea or changes in his bowel habits.  Follow-up CT scans of the chest, abdomen and pelvis show mild progression of his mediastinal and left axillary adenopathy as well as progression of the large confluent soniya mass in the retroperitoneum.      Review of Systems   Constitutional:  Positive for fatigue. Negative for appetite change, fever, night sweats and unexpected weight change.   HENT:  Positive for hearing loss. Negative for mouth sores, sore throat and trouble swallowing.    Eyes: Negative.    Respiratory:  Negative for cough, shortness of breath and wheezing.    Cardiovascular:  Negative for chest pain, palpitations and leg swelling.   Gastrointestinal:  Negative for abdominal distention, abdominal pain, constipation,  "diarrhea and nausea.   Genitourinary:  Positive for frequency. Negative for dysuria, flank pain and hematuria.   Musculoskeletal:  Negative for arthralgias and back pain.   Integumentary:  Negative for pallor and rash.   Neurological:  Positive for weakness (Generalized, nonfocal). Negative for dizziness, numbness and headaches.   Hematological:  Negative for adenopathy. Does not bruise/bleed easily.   Psychiatric/Behavioral:  Positive for sleep disturbance (Chronic). Negative for confusion and dysphoric mood. The patient is not nervous/anxious.         Bipolar disorder - stable       PMHx:  CAD, HTN, hypercholesterolemia, arthritis, diabetes mellitus with neuropathy, bipolar disorder, BPH, cataracts, melanoma right back 2005, anemia  PSHx:  Right hemicolectomy 9/17, colonoscopy, tonsillectomy, melanoma resection right lower back, Mediport, excision left arm lesion  SH:  No cigarettes, + smokeless tobacco, quit 2014.  FH:  His father had multiple myeloma.     Objective:        /70 (Patient Position: Sitting)   Pulse 65   Temp 97.8 °F (36.6 °C)   Resp 15   Ht 5' 9" (1.753 m)   Wt 72.4 kg (159 lb 9.6 oz)   BMI 23.57 kg/m²    Physical Exam  Constitutional:       Comments: Elderly white male in NAD, ambulatory with a cane   HENT:      Head: Normocephalic.      Mouth/Throat:      Mouth: Mucous membranes are moist.      Pharynx: Oropharynx is clear. No posterior oropharyngeal erythema.   Eyes:      General: No scleral icterus.     Extraocular Movements: Extraocular movements intact.      Pupils: Pupils are equal, round, and reactive to light.   Cardiovascular:      Rate and Rhythm: Normal rate and regular rhythm.      Heart sounds: No murmur heard.  Pulmonary:      Comments: Lungs clear to auscultation  Abdominal:      General: Bowel sounds are normal.      Palpations: Abdomen is soft.      Comments: Well-healed laparoscopic incision sites.  Slightly distended, but nontender.  No palpable fullness, mass or " splenomegaly.   Musculoskeletal:         General: No swelling or tenderness.      Cervical back: Neck supple. No tenderness.   Lymphadenopathy:      Comments: Healing left axillary incision.  No palpable peripheral lymphadenopathy.   Skin:     General: Skin is warm and dry.      Findings: No rash.      Comments: MediPort removal incision right chest wall.   Neurological:      General: No focal deficit present.      Mental Status: He is alert and oriented to person, place, and time.      Cranial Nerves: No cranial nerve deficit.      Motor: No weakness.       ECOG SCORE    1 - Restricted in strenuous activity-ambulatory and able to carry out work of a light nature          LABORATORY  Recent Results (from the past week)   Comprehensive Metabolic Panel    Collection Time: 02/12/25 12:13 PM   Result Value Ref Range    Sodium 143 136 - 145 mmol/L    Potassium 4.6 3.5 - 5.1 mmol/L    Chloride 110 98 - 111 mmol/L    CO2 24 23 - 31 mmol/L    Glucose 127 (H) 75 - 121 mg/dL    Blood Urea Nitrogen 23.2 8.4 - 25.7 mg/dL    Creatinine 1.09 0.72 - 1.25 mg/dL    Calcium 8.6 (L) 8.8 - 10.0 mg/dL    Protein Total 6.2 5.8 - 7.6 gm/dL    Albumin 3.0 (L) 3.4 - 4.8 g/dL    Globulin 3.2 2.4 - 3.5 gm/dL    Albumin/Globulin Ratio 0.9 (L) 1.1 - 2.0 ratio    Bilirubin Total 0.3 <=1.5 mg/dL    ALP 87 40 - 150 unit/L    ALT 12 0 - 55 unit/L    AST 16 5 - 34 unit/L    eGFR >60 mL/min/1.73/m2    Anion Gap 9.0 mEq/L    BUN/Creatinine Ratio 21    Lactate Dehydrogenase    Collection Time: 02/12/25 12:13 PM   Result Value Ref Range    Lactate Dehydrogenase 299 (H) 125 - 220 U/L   CBC with Differential    Collection Time: 02/12/25 12:13 PM   Result Value Ref Range    WBC 8.45 4.50 - 11.50 x10(3)/mcL    RBC 4.44 (L) 4.70 - 6.10 x10(6)/mcL    Hgb 13.3 (L) 14.0 - 18.0 g/dL    Hct 40.8 (L) 42.0 - 52.0 %    MCV 91.9 80.0 - 94.0 fL    MCH 30.0 27.0 - 31.0 pg    MCHC 32.6 (L) 33.0 - 36.0 g/dL    RDW 13.3 11.5 - 17.0 %    Platelet 264 130 - 400 x10(3)/mcL     MPV 8.8 7.4 - 10.4 fL    Neut % 77.7 %    Lymph % 9.6 %    Mono % 10.2 %    Eos % 1.8 %    Basophil % 0.6 %    Imm Grans % 0.1 %    Neut # 6.57 2.1 - 9.2 x10(3)/mcL    Lymph # 0.81 0.6 - 4.6 x10(3)/mcL    Mono # 0.86 0.1 - 1.3 x10(3)/mcL    Eos # 0.15 0 - 0.9 x10(3)/mcL    Baso # 0.05 <=0.2 x10(3)/mcL    Imm Gran # 0.01 0.00 - 0.04 x10(3)/mcL         Assessment:   Stage IIIA follicular NHL - grade 3b  Stage III colon cancer 9/17 - REGINALD      Plan:   CT scans show minor disease progression on maintenance therapy with single agent Rituximab.    CT images were reviewed in the office today in the presence of the patient and his son.    Rituximab will be discontinued.    Additional treatment was recommended with Obinutuzumab IV in combination with Zanubrutinib.  Benefits, risks, toxicities and side effects of this treatment regimen were discussed and reviewed in detail. All questions were answered. Literature regarding the treatment plan and specific drug information was also provided.  He will be scheduled to start treatment in approximately 2 weeks to allow for drug acquisition.  Weekly laboratory monitoring with the 1st cycle of therapy.  RTC after his 1st cycle of treatment for a follow-up visit and toxicity evaluation.      SAMY NDIAYE MD    Other Physicians  Dr. Luis Jorgensen ([facility])

## 2025-02-03 ENCOUNTER — TELEPHONE (OUTPATIENT)
Dept: PRIMARY CARE CLINIC | Facility: CLINIC | Age: OVER 89
End: 2025-02-03
Payer: MEDICARE

## 2025-02-03 DIAGNOSIS — I10 PRIMARY HYPERTENSION: Primary | ICD-10-CM

## 2025-02-03 RX ORDER — AMLODIPINE BESYLATE 10 MG/1
10 TABLET ORAL DAILY
Qty: 90 TABLET | Refills: 3 | Status: SHIPPED | OUTPATIENT
Start: 2025-02-03

## 2025-02-03 RX ORDER — LOSARTAN POTASSIUM 100 MG/1
100 TABLET ORAL DAILY
Qty: 90 TABLET | Refills: 3 | Status: SHIPPED | OUTPATIENT
Start: 2025-02-03

## 2025-02-03 NOTE — TELEPHONE ENCOUNTER
----- Message from Rosana sent at 2/3/2025 12:47 PM CST -----  Regarding: PA since 1/22  .Type:  Needs Medical Advice    Who Called: pt  Would the patient rather a call back or a response via [facility]? tori  Best Call Back Number:  [phone]  Additional Information: require PA on several meds sent to phar since 1/22  losartan (COZAAR) 100 MG tablet  amLODIPine (NORVASC) 10 MG tablet  [facility] - [place], [place] - [address]

## 2025-02-12 ENCOUNTER — LAB VISIT (OUTPATIENT)
Dept: LAB | Facility: HOSPITAL | Age: OVER 89
End: 2025-02-12
Attending: INTERNAL MEDICINE
Payer: MEDICARE

## 2025-02-12 ENCOUNTER — OFFICE VISIT (OUTPATIENT)
Dept: HEMATOLOGY/ONCOLOGY | Facility: CLINIC | Age: OVER 89
End: 2025-02-12
Payer: MEDICARE

## 2025-02-12 VITALS
BODY MASS INDEX: 23.64 KG/M2 | HEIGHT: 69 IN | HEART RATE: 65 BPM | SYSTOLIC BLOOD PRESSURE: 120 MMHG | RESPIRATION RATE: 15 BRPM | WEIGHT: 159.63 LBS | TEMPERATURE: 98 F | DIASTOLIC BLOOD PRESSURE: 70 MMHG

## 2025-02-12 DIAGNOSIS — C82.48 GRADE 3B FOLLICULAR LYMPHOMA OF LYMPH NODES OF MULTIPLE REGIONS: Primary | ICD-10-CM

## 2025-02-12 DIAGNOSIS — C82.48 GRADE 3B FOLLICULAR LYMPHOMA OF LYMPH NODES OF MULTIPLE REGIONS: ICD-10-CM

## 2025-02-12 DIAGNOSIS — Z85.038 PERSONAL HISTORY OF COLON CANCER: ICD-10-CM

## 2025-02-12 LAB
ALBUMIN SERPL-MCNC: 3 G/DL (ref 3.4–4.8)
ALBUMIN/GLOB SERPL: 0.9 RATIO (ref 1.1–2)
ALP SERPL-CCNC: 87 UNIT/L (ref 40–150)
ALT SERPL-CCNC: 12 UNIT/L (ref 0–55)
ANION GAP SERPL CALC-SCNC: 9 MEQ/L
AST SERPL-CCNC: 16 UNIT/L (ref 5–34)
BASOPHILS # BLD AUTO: 0.05 X10(3)/MCL
BASOPHILS NFR BLD AUTO: 0.6 %
BILIRUB SERPL-MCNC: 0.3 MG/DL
BUN SERPL-MCNC: 23.2 MG/DL (ref 8.4–25.7)
CALCIUM SERPL-MCNC: 8.6 MG/DL (ref 8.8–10)
CHLORIDE SERPL-SCNC: 110 MMOL/L (ref 98–111)
CO2 SERPL-SCNC: 24 MMOL/L (ref 23–31)
CREAT SERPL-MCNC: 1.09 MG/DL (ref 0.72–1.25)
CREAT/UREA NIT SERPL: 21
EOSINOPHIL # BLD AUTO: 0.15 X10(3)/MCL (ref 0–0.9)
EOSINOPHIL NFR BLD AUTO: 1.8 %
ERYTHROCYTE [DISTWIDTH] IN BLOOD BY AUTOMATED COUNT: 13.3 % (ref 11.5–17)
GFR SERPLBLD CREATININE-BSD FMLA CKD-EPI: >60 ML/MIN/1.73/M2
GLOBULIN SER-MCNC: 3.2 GM/DL (ref 2.4–3.5)
GLUCOSE SERPL-MCNC: 127 MG/DL (ref 75–121)
HCT VFR BLD AUTO: 40.8 % (ref 42–52)
HGB BLD-MCNC: 13.3 G/DL (ref 14–18)
IMM GRANULOCYTES # BLD AUTO: 0.01 X10(3)/MCL (ref 0–0.04)
IMM GRANULOCYTES NFR BLD AUTO: 0.1 %
LDH SERPL-CCNC: 299 U/L (ref 125–220)
LYMPHOCYTES # BLD AUTO: 0.81 X10(3)/MCL (ref 0.6–4.6)
LYMPHOCYTES NFR BLD AUTO: 9.6 %
MCH RBC QN AUTO: 30 PG (ref 27–31)
MCHC RBC AUTO-ENTMCNC: 32.6 G/DL (ref 33–36)
MCV RBC AUTO: 91.9 FL (ref 80–94)
MONOCYTES # BLD AUTO: 0.86 X10(3)/MCL (ref 0.1–1.3)
MONOCYTES NFR BLD AUTO: 10.2 %
NEUTROPHILS # BLD AUTO: 6.57 X10(3)/MCL (ref 2.1–9.2)
NEUTROPHILS NFR BLD AUTO: 77.7 %
PLATELET # BLD AUTO: 264 X10(3)/MCL (ref 130–400)
PMV BLD AUTO: 8.8 FL (ref 7.4–10.4)
POTASSIUM SERPL-SCNC: 4.6 MMOL/L (ref 3.5–5.1)
PROT SERPL-MCNC: 6.2 GM/DL (ref 5.8–7.6)
RBC # BLD AUTO: 4.44 X10(6)/MCL (ref 4.7–6.1)
SODIUM SERPL-SCNC: 143 MMOL/L (ref 136–145)
WBC # BLD AUTO: 8.45 X10(3)/MCL (ref 4.5–11.5)

## 2025-02-12 PROCEDURE — 83615 LACTATE (LD) (LDH) ENZYME: CPT

## 2025-02-12 PROCEDURE — 99213 OFFICE O/P EST LOW 20 MIN: CPT | Mod: PBBFAC | Performed by: INTERNAL MEDICINE

## 2025-02-12 PROCEDURE — 99999 PR PBB SHADOW E&M-EST. PATIENT-LVL III: CPT | Mod: PBBFAC,,, | Performed by: INTERNAL MEDICINE

## 2025-02-12 PROCEDURE — 80053 COMPREHEN METABOLIC PANEL: CPT

## 2025-02-12 PROCEDURE — 36415 COLL VENOUS BLD VENIPUNCTURE: CPT

## 2025-02-12 PROCEDURE — 85025 COMPLETE CBC W/AUTO DIFF WBC: CPT

## 2025-02-12 RX ORDER — MEPERIDINE HYDROCHLORIDE 50 MG/ML
25 INJECTION INTRAMUSCULAR; INTRAVENOUS; SUBCUTANEOUS
OUTPATIENT
Start: 2025-02-26

## 2025-02-12 RX ORDER — ACETAMINOPHEN 325 MG/1
650 TABLET ORAL ONCE
OUTPATIENT
Start: 2025-02-26

## 2025-02-12 RX ORDER — DIPHENHYDRAMINE HYDROCHLORIDE 50 MG/ML
50 INJECTION INTRAMUSCULAR; INTRAVENOUS ONCE AS NEEDED
OUTPATIENT
Start: 2025-03-12

## 2025-02-12 RX ORDER — ACETAMINOPHEN 325 MG/1
650 TABLET ORAL ONCE
OUTPATIENT
Start: 2025-03-05

## 2025-02-12 RX ORDER — MEPERIDINE HYDROCHLORIDE 50 MG/ML
25 INJECTION INTRAMUSCULAR; INTRAVENOUS; SUBCUTANEOUS
OUTPATIENT
Start: 2025-03-05

## 2025-02-12 RX ORDER — EPINEPHRINE 0.3 MG/.3ML
0.3 INJECTION SUBCUTANEOUS ONCE AS NEEDED
OUTPATIENT
Start: 2025-02-26

## 2025-02-12 RX ORDER — DIPHENHYDRAMINE HYDROCHLORIDE 50 MG/ML
50 INJECTION INTRAMUSCULAR; INTRAVENOUS ONCE AS NEEDED
OUTPATIENT
Start: 2025-03-05

## 2025-02-12 RX ORDER — PROCHLORPERAZINE EDISYLATE 5 MG/ML
5 INJECTION INTRAMUSCULAR; INTRAVENOUS ONCE AS NEEDED
OUTPATIENT
Start: 2025-02-26

## 2025-02-12 RX ORDER — SODIUM CHLORIDE 0.9 % (FLUSH) 0.9 %
10 SYRINGE (ML) INJECTION
OUTPATIENT
Start: 2025-03-12

## 2025-02-12 RX ORDER — HEPARIN 100 UNIT/ML
500 SYRINGE INTRAVENOUS
OUTPATIENT
Start: 2025-03-12

## 2025-02-12 RX ORDER — ACETAMINOPHEN 325 MG/1
650 TABLET ORAL ONCE
OUTPATIENT
Start: 2025-03-12

## 2025-02-12 RX ORDER — DIPHENHYDRAMINE HYDROCHLORIDE 50 MG/ML
50 INJECTION INTRAMUSCULAR; INTRAVENOUS ONCE AS NEEDED
OUTPATIENT
Start: 2025-02-26

## 2025-02-12 RX ORDER — PROCHLORPERAZINE EDISYLATE 5 MG/ML
5 INJECTION INTRAMUSCULAR; INTRAVENOUS ONCE AS NEEDED
OUTPATIENT
Start: 2025-03-12

## 2025-02-12 RX ORDER — PROCHLORPERAZINE EDISYLATE 5 MG/ML
5 INJECTION INTRAMUSCULAR; INTRAVENOUS ONCE AS NEEDED
OUTPATIENT
Start: 2025-03-05

## 2025-02-12 RX ORDER — HEPARIN 100 UNIT/ML
500 SYRINGE INTRAVENOUS
OUTPATIENT
Start: 2025-03-05

## 2025-02-12 RX ORDER — HEPARIN 100 UNIT/ML
500 SYRINGE INTRAVENOUS
OUTPATIENT
Start: 2025-02-26

## 2025-02-12 RX ORDER — MEPERIDINE HYDROCHLORIDE 50 MG/ML
25 INJECTION INTRAMUSCULAR; INTRAVENOUS; SUBCUTANEOUS
OUTPATIENT
Start: 2025-03-12

## 2025-02-12 RX ORDER — SODIUM CHLORIDE 0.9 % (FLUSH) 0.9 %
10 SYRINGE (ML) INJECTION
OUTPATIENT
Start: 2025-03-05

## 2025-02-12 RX ORDER — EPINEPHRINE 0.3 MG/.3ML
0.3 INJECTION SUBCUTANEOUS ONCE AS NEEDED
OUTPATIENT
Start: 2025-03-12

## 2025-02-12 RX ORDER — SODIUM CHLORIDE 0.9 % (FLUSH) 0.9 %
10 SYRINGE (ML) INJECTION
OUTPATIENT
Start: 2025-02-26

## 2025-02-12 RX ORDER — EPINEPHRINE 0.3 MG/.3ML
0.3 INJECTION SUBCUTANEOUS ONCE AS NEEDED
OUTPATIENT
Start: 2025-03-05

## 2025-02-13 ENCOUNTER — TELEPHONE (OUTPATIENT)
Dept: HEMATOLOGY/ONCOLOGY | Facility: CLINIC | Age: OVER 89
End: 2025-02-13
Payer: MEDICARE

## 2025-02-13 DIAGNOSIS — C82.48 GRADE 3B FOLLICULAR LYMPHOMA OF LYMPH NODES OF MULTIPLE REGIONS: Primary | ICD-10-CM

## 2025-02-13 RX ORDER — TRAMADOL HYDROCHLORIDE 50 MG/1
50 TABLET ORAL EVERY 6 HOURS PRN
Qty: 60 TABLET | Refills: 0 | Status: SHIPPED | OUTPATIENT
Start: 2025-02-13

## 2025-02-13 NOTE — TELEPHONE ENCOUNTER
Received pt message from : Pt called regarding a prescription that Dr. Gerber sent to his pharmacy and the drug is expensive and would like to stop it from having it filled. He is requesting that someone give him a call regarding his symptoms and medication.

## 2025-03-06 NOTE — PROGRESS NOTES
Subjective:       Patient ID: Blaine Elmore Sr. is a 89 y.o. male.    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 Colonoscopy 9/11/17 showed a malignant-appearing mass in the cecum. Biopsy was positive for moderately differentiated adenocarcinoma. Preoperative CEA level was 3.5 ng/mL. CT scans of the chest, abdomen and pelvis 9/12/17 showed severe hepatic steatosis and hypodense lesions compatible with cysts. There was a 1.9 cm lytic appearing lesion in the right ischium and 3-4 mm bilateral pulmonary nodules. Bone scan 9/18/17 showed uptake in the proximal left tibia but no abnormal activity in the pelvis. Plain films showed no significant abnormalities. He underwent a right hemicolectomy 9/22/17. Final pathology revealed a 3.7 cm moderately differentiated adenocarcinoma extending into but not through the muscularis propria. Resection margins were clear. 2 out of 24 pericolonic lymph nodes were positive. He presented to [facility] for a treatment opinion. He was staged as IIIA. Recommendations were for adjuvant treatment with CAPOX for 3 months versus oral Xeloda for 6 months. He remained weak postoperatively, and decided he did not want to consider infusional chemotherapy.    [facility] to establish local Oncology care.  Treatment options were reviewed and discussed again.  He opted for therapy with oral Xeloda for 6 months as tolerated. Laboratory showed evidence of iron deficiency anemia which was treated with IV Injectafer x 2 doses with recovery of his blood counts.  He initiated  Xeloda 11/27/17 and completed 6 months of therapy 5/18 without significant side effects. Follow-up CT scans of the chest, abdomen and pelvis 5/30/18 showed stable hypoattenuating liver lesions bilaterally, largest on the right measured 14 mm. There were stable on an outside CT scan and felt to represent cysts. There were also small, nonspecific pulmonary nodules, none measuring more than 4 mm that were also stable compared to his prior films. Follow-up CT scans 9/27/18 showed similar findings. There was also a stable lytic lesion in the right ischial tuberosity and right L1 pedicle and a mixed lytic/sclerotic focus in the right fourth rib. He had no associated symptoms. CT PET scan 2/5/19 showed unchanged small punctate pulmonary nodules with no significant hypermetabolic activity. 14 mm right hepatic lobe hypodensity showed no significant glucose uptake. The lucent lesions described in the right ilium, L1 and right fourth rib showed no glucose uptake. He has had no evidence of disease recurrence on follow-up and additional surveillance imaging.    He presented to his PCP 7/11/24 complaining of 15 lb weight loss over several weeks, weakness, fatigue and a nonproductive cough.  He denied any fever, chills or night sweats.   CXR 7/11/24:  Small right pleural effusion and widened mediastinum.  CT Chest 7/18/24:  Bulky confluent mediastinal adenopathy and left axillary lymph node measuring 1.7 x 1.1 cm with a partially imaged soniya mass in the abdomen encasing the superior mesenteric artery.  CT-PET 7/23/24:  Widespread hypermetabolic adenopathy involving the lower neck, mediastinum and abdomen.  Large conglomerate soniya mass in the midabdomen surrounding the aorta measuring up to 14 x 12 cm with a maximum SUV of 13.  No obvious sites of extranodal disease.  Left axillary LN biopsy 7/25/24:  Follicular lymphoma, grade 3b    Due to his advanced age, treatment was recommended with single agent Rituximab 375 mg/m2 weekly  for 4 weeks. Hepatitis-B serology was negative.  Baseline LDH and uric acid levels were normal.  He started treatment on 8/5/24.  He received allopurinol for tumor lysis prophylaxis.  He completed 4 weeks of therapy 8/26/24.    CT C/A/P 10/04/24:  Decreased size of all areas of adenopathy.  Right paratracheal 3.4 x 2.9 cm (previous 4.3 x 3.6 cm). No significant axillary or supraclavicular adenopathy.  Decreased right pleural effusion.  Confluent retroperitoneal soniya mass approximately 8 cm AP (previous 10.6 cm).  CT C/A/P 2/07/25:  Disease progression.  Prevascular LN 3.4 x 2.9 cm (previous 2.1 x 2.0 cm), subcarinal LN 2.9 x 2.4 cm and prevascular LN 3.5 x 1.8 cm (previous 2.3 x 1.8 cm).  Left axillary LN 1.7 x 1.2 cm.  Confluent soniya mass involving the retroperitoneum and small bowel mesentery 9.5 cm AP (previous 8 cm).      Additional treatment was discussed with a regimen of obinutuzumab and zanubrutinib.  Benefits, risks and toxicities were reviewed and discussed in detail.  He initially agreed to treatment, but subsequently called the office and had changed his mind.  He made an informed decision not to pursue treatment after further discussion with his son due to concerns for potential side-effects.      Interval History  He returns to clinic today for a 4 week follow-up visit accompanied by his son.  He turned 89 last week.  He continues to have a good functional status.  He does complain of fatigue and increased tiredness.  He has not had any significant weight loss.  No fever, chills or night sweats.  He reports abdominal fullness, but no overt pain.  No changes in his bowel habits, no melena or hematochezia.  He does not want to pursue additional treatment at this time.  He is more concerned about maintaining his quality of life.      Review of Systems   Constitutional:  Positive for fatigue. Negative for appetite change, fever, night sweats and unexpected weight change.   HENT:  Positive for hearing  "loss. Negative for mouth sores, sore throat and trouble swallowing.    Eyes: Negative.    Respiratory:  Negative for cough, shortness of breath and wheezing.    Cardiovascular:  Negative for chest pain, palpitations and leg swelling.   Gastrointestinal:  Negative for abdominal distention, abdominal pain, constipation, diarrhea and nausea.   Genitourinary:  Positive for frequency. Negative for dysuria, flank pain and hematuria.   Musculoskeletal:  Negative for arthralgias and back pain.   Integumentary:  Negative for pallor and rash.   Neurological:  Positive for weakness (Generalized, nonfocal). Negative for dizziness, numbness and headaches.   Hematological:  Negative for adenopathy. Does not bruise/bleed easily.   Psychiatric/Behavioral:  Positive for sleep disturbance (Chronic). Negative for confusion and dysphoric mood. The patient is not nervous/anxious.         Bipolar disorder - stable       PMHx:  CAD, HTN, hypercholesterolemia, arthritis, diabetes mellitus with neuropathy, bipolar disorder, BPH, cataracts, melanoma right back 2005, anemia  PSHx:  Right hemicolectomy 9/17, colonoscopy, tonsillectomy, melanoma resection right lower back, Mediport, excision left arm lesion  SH:  No cigarettes, + smokeless tobacco, quit 2014.  FH:  His father had multiple myeloma.     Objective:        /82 (Patient Position: Sitting)   Pulse 108   Temp 98.3 °F (36.8 °C)   Resp 15   Ht 5' 9" (1.753 m)   Wt 73.1 kg (161 lb 1.6 oz)   BMI 23.79 kg/m²    Physical Exam  Constitutional:       Comments: Elderly white male in NAD, ambulatory with a cane   HENT:      Head: Normocephalic.      Mouth/Throat:      Mouth: Mucous membranes are moist.      Pharynx: Oropharynx is clear. No posterior oropharyngeal erythema.   Eyes:      General: No scleral icterus.     Extraocular Movements: Extraocular movements intact.      Pupils: Pupils are equal, round, and reactive to light.   Cardiovascular:      Rate and Rhythm: Normal rate " and regular rhythm.      Heart sounds: No murmur heard.  Pulmonary:      Comments: Lungs clear to auscultation  Abdominal:      General: Bowel sounds are normal.      Palpations: Abdomen is soft.      Comments: Well-healed laparoscopic incision sites.  Slightly distended, but nontender.  No palpable fullness, mass or splenomegaly.   Musculoskeletal:         General: No swelling or tenderness.      Cervical back: Neck supple. No tenderness.   Lymphadenopathy:      Comments: Healing left axillary incision.  No palpable peripheral lymphadenopathy.   Skin:     General: Skin is warm and dry.      Findings: No rash.      Comments: MediPort removal incision right chest wall.   Neurological:      General: No focal deficit present.      Mental Status: He is alert and oriented to person, place, and time.      Cranial Nerves: No cranial nerve deficit.      Motor: No weakness.       ECOG SCORE    1 - Restricted in strenuous activity-ambulatory and able to carry out work of a light nature          LABORATORY  Recent Results (from the past week)   CBC with Differential    Collection Time: 03/19/25 12:56 PM   Result Value Ref Range    WBC 9.04 4.50 - 11.50 x10(3)/mcL    RBC 4.29 (L) 4.70 - 6.10 x10(6)/mcL    Hgb 12.9 (L) 14.0 - 18.0 g/dL    Hct 39.3 (L) 42.0 - 52.0 %    MCV 91.6 80.0 - 94.0 fL    MCH 30.1 27.0 - 31.0 pg    MCHC 32.8 (L) 33.0 - 36.0 g/dL    RDW 14.1 11.5 - 17.0 %    Platelet 235 130 - 400 x10(3)/mcL    MPV 8.9 7.4 - 10.4 fL    Neut % 84.2 %    Lymph % 6.9 %    Mono % 7.0 %    Eos % 1.1 %    Basophil % 0.7 %    Imm Grans % 0.1 %    Neut # 7.62 2.1 - 9.2 x10(3)/mcL    Lymph # 0.62 0.6 - 4.6 x10(3)/mcL    Mono # 0.63 0.1 - 1.3 x10(3)/mcL    Eos # 0.10 0 - 0.9 x10(3)/mcL    Baso # 0.06 <=0.2 x10(3)/mcL    Imm Gran # 0.01 0.00 - 0.04 x10(3)/mcL           Assessment:   Stage IIIA follicular NHL - grade 3b  Stage III colon cancer 9/17 - REGINALD      Plan:   CT scans 2/7/25 showed minor disease progression.    Patient has  elected to pursue best supportive care and symptomatic management rather than additional treatment for his follicular lymphoma.    His family is in agreement with his decision.    He does not have significant symptoms attributable to his lymphoma at this time.    RTC in 3 months for a follow-up visit and clinical exam.      SAMY NDIAYE MD    Other Physicians  Dr. Luis Jorgensen ([facility])

## 2025-03-19 ENCOUNTER — LAB VISIT (OUTPATIENT)
Dept: LAB | Facility: HOSPITAL | Age: OVER 89
End: 2025-03-19
Attending: INTERNAL MEDICINE
Payer: MEDICARE

## 2025-03-19 ENCOUNTER — OFFICE VISIT (OUTPATIENT)
Dept: HEMATOLOGY/ONCOLOGY | Facility: CLINIC | Age: OVER 89
End: 2025-03-19
Payer: MEDICARE

## 2025-03-19 VITALS
RESPIRATION RATE: 15 BRPM | HEIGHT: 69 IN | WEIGHT: 161.13 LBS | SYSTOLIC BLOOD PRESSURE: 126 MMHG | BODY MASS INDEX: 23.87 KG/M2 | DIASTOLIC BLOOD PRESSURE: 82 MMHG | TEMPERATURE: 98 F | HEART RATE: 108 BPM

## 2025-03-19 DIAGNOSIS — C82.48 GRADE 3B FOLLICULAR LYMPHOMA OF LYMPH NODES OF MULTIPLE REGIONS: Primary | ICD-10-CM

## 2025-03-19 DIAGNOSIS — Z85.038 PERSONAL HISTORY OF COLON CANCER: ICD-10-CM

## 2025-03-19 DIAGNOSIS — C82.48 GRADE 3B FOLLICULAR LYMPHOMA OF LYMPH NODES OF MULTIPLE REGIONS: ICD-10-CM

## 2025-03-19 LAB
ALBUMIN SERPL-MCNC: 3 G/DL (ref 3.4–4.8)
ALBUMIN/GLOB SERPL: 1 RATIO (ref 1.1–2)
ALP SERPL-CCNC: 91 UNIT/L (ref 40–150)
ALT SERPL-CCNC: 15 UNIT/L (ref 0–55)
ANION GAP SERPL CALC-SCNC: 8 MEQ/L
AST SERPL-CCNC: 23 UNIT/L (ref 5–34)
BASOPHILS # BLD AUTO: 0.06 X10(3)/MCL
BASOPHILS NFR BLD AUTO: 0.7 %
BILIRUB SERPL-MCNC: 0.4 MG/DL
BUN SERPL-MCNC: 23.4 MG/DL (ref 8.4–25.7)
CALCIUM SERPL-MCNC: 8.7 MG/DL (ref 8.8–10)
CHLORIDE SERPL-SCNC: 112 MMOL/L (ref 98–111)
CO2 SERPL-SCNC: 22 MMOL/L (ref 23–31)
CREAT SERPL-MCNC: 0.95 MG/DL (ref 0.72–1.25)
CREAT/UREA NIT SERPL: 25
EOSINOPHIL # BLD AUTO: 0.1 X10(3)/MCL (ref 0–0.9)
EOSINOPHIL NFR BLD AUTO: 1.1 %
ERYTHROCYTE [DISTWIDTH] IN BLOOD BY AUTOMATED COUNT: 14.1 % (ref 11.5–17)
GFR SERPLBLD CREATININE-BSD FMLA CKD-EPI: >60 ML/MIN/1.73/M2
GLOBULIN SER-MCNC: 3 GM/DL (ref 2.4–3.5)
GLUCOSE SERPL-MCNC: 103 MG/DL (ref 75–121)
HCT VFR BLD AUTO: 39.3 % (ref 42–52)
HGB BLD-MCNC: 12.9 G/DL (ref 14–18)
IMM GRANULOCYTES # BLD AUTO: 0.01 X10(3)/MCL (ref 0–0.04)
IMM GRANULOCYTES NFR BLD AUTO: 0.1 %
LYMPHOCYTES # BLD AUTO: 0.62 X10(3)/MCL (ref 0.6–4.6)
LYMPHOCYTES NFR BLD AUTO: 6.9 %
MCH RBC QN AUTO: 30.1 PG (ref 27–31)
MCHC RBC AUTO-ENTMCNC: 32.8 G/DL (ref 33–36)
MCV RBC AUTO: 91.6 FL (ref 80–94)
MONOCYTES # BLD AUTO: 0.63 X10(3)/MCL (ref 0.1–1.3)
MONOCYTES NFR BLD AUTO: 7 %
NEUTROPHILS # BLD AUTO: 7.62 X10(3)/MCL (ref 2.1–9.2)
NEUTROPHILS NFR BLD AUTO: 84.2 %
PLATELET # BLD AUTO: 235 X10(3)/MCL (ref 130–400)
PMV BLD AUTO: 8.9 FL (ref 7.4–10.4)
POTASSIUM SERPL-SCNC: 4.5 MMOL/L (ref 3.5–5.1)
PROT SERPL-MCNC: 6 GM/DL (ref 5.8–7.6)
RBC # BLD AUTO: 4.29 X10(6)/MCL (ref 4.7–6.1)
SODIUM SERPL-SCNC: 142 MMOL/L (ref 136–145)
WBC # BLD AUTO: 9.04 X10(3)/MCL (ref 4.5–11.5)

## 2025-03-19 PROCEDURE — 36415 COLL VENOUS BLD VENIPUNCTURE: CPT

## 2025-03-19 PROCEDURE — 99213 OFFICE O/P EST LOW 20 MIN: CPT | Mod: PBBFAC | Performed by: INTERNAL MEDICINE

## 2025-03-19 PROCEDURE — 85025 COMPLETE CBC W/AUTO DIFF WBC: CPT

## 2025-03-19 PROCEDURE — 99999 PR PBB SHADOW E&M-EST. PATIENT-LVL III: CPT | Mod: PBBFAC,,, | Performed by: INTERNAL MEDICINE

## 2025-03-19 PROCEDURE — 80053 COMPREHEN METABOLIC PANEL: CPT

## 2025-03-25 ENCOUNTER — TELEPHONE (OUTPATIENT)
Dept: PRIMARY CARE CLINIC | Facility: CLINIC | Age: OVER 89
End: 2025-03-25
Payer: MEDICARE

## 2025-03-25 NOTE — TELEPHONE ENCOUNTER
----- Message from Rosana sent at 3/25/2025  7:52 AM CDT -----  Regarding: ref  .Type:  RX Refill RequestWho Called: ptRefill or New Rx:reRX Name and Strength:zolpidem (AMBIEN) 5 MG TabHow is the patient currently taking it? (ex. 1XDay):Take 1 tablet (5 mg total) by mouth nightly as needed (InsomniaIs this a 30 day or 90 day RX:30Preferred Pharmacy with phone number:[facility] - [place], [place] - [address] RDLocal or Mail Order:Ordering Provider:Would the patient rather a call back or a response via [facility]? [facility] Call Back Number: [phone] Information: pt request 10 mg

## 2025-03-26 DIAGNOSIS — F51.01 PRIMARY INSOMNIA: ICD-10-CM

## 2025-03-26 RX ORDER — ZOLPIDEM TARTRATE 5 MG/1
5 TABLET ORAL NIGHTLY PRN
Qty: 30 TABLET | Refills: 5 | Status: SHIPPED | OUTPATIENT
Start: 2025-03-26 | End: 2025-09-22

## 2025-05-14 ENCOUNTER — TELEPHONE (OUTPATIENT)
Dept: HEMATOLOGY/ONCOLOGY | Facility: CLINIC | Age: OVER 89
End: 2025-05-14
Payer: MEDICARE

## 2025-05-14 NOTE — TELEPHONE ENCOUNTER
Patient states he has started to feel weak; past 4-5 days. Denies any fever, respiratory / urinary sx. No fever, night sweats or weight loss. States he is eating and drinking okay; supplementing with 4 Boost drinks daily. States when  he bends over and gets back up he is short of breath. Now ambulates with cane due to weakness. He would like to know if Dr. Gerber has any recommendations. Did mention that he has left over prednisone that he can use. Also, wanted to know if it would be too late to start treatment. He is not necessarily on board with treatment, but wanted to know if it would be an option still. Please advise.

## 2025-05-14 NOTE — TELEPHONE ENCOUNTER
Spoke with patient and advised him per Dr. Gerber to take prednisone 20 once daily( he said he has a bottle of the 20 mg dose with about 20 tabs left) and moved up visit to Monday 5/19/2025 at 240 for labs and visit with Dr. Gerber.

## 2025-05-17 NOTE — PROGRESS NOTES
Subjective:       Patient ID: Blaine Elmore Sr. is a 89 y.o. male.    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 Colonoscopy 9/11/17 showed a malignant-appearing mass in the cecum. Biopsy was positive for moderately differentiated adenocarcinoma. Preoperative CEA level was 3.5 ng/mL. CT scans of the chest, abdomen and pelvis 9/12/17 showed severe hepatic steatosis and hypodense lesions compatible with cysts. There was a 1.9 cm lytic appearing lesion in the right ischium and 3-4 mm bilateral pulmonary nodules. Bone scan 9/18/17 showed uptake in the proximal left tibia but no abnormal activity in the pelvis. Plain films showed no significant abnormalities. He underwent a right hemicolectomy 9/22/17. Final pathology revealed a 3.7 cm moderately differentiated adenocarcinoma extending into but not through the muscularis propria. Resection margins were clear. 2 out of 24 pericolonic lymph nodes were positive. He presented to [facility] for a treatment opinion. He was staged as IIIA. Recommendations were for adjuvant treatment with CAPOX for 3 months versus oral Xeloda for 6 months. He remained weak postoperatively, and decided he did not want to consider infusional chemotherapy.    [facility] to establish local Oncology care.  Treatment options were reviewed and discussed again.  He opted for therapy with oral Xeloda for 6 months as tolerated. Laboratory showed evidence of iron deficiency anemia which was treated with IV Injectafer x 2 doses with recovery of his blood counts.  He  initiated Xeloda 11/27/17 and completed 6 months of therapy 5/18 without significant side effects. Follow-up CT scans of the chest, abdomen and pelvis 5/30/18 showed stable hypoattenuating liver lesions bilaterally, largest on the right measured 14 mm. There were stable on an outside CT scan and felt to represent cysts. There were also small, nonspecific pulmonary nodules, none measuring more than 4 mm that were also stable compared to his prior films. Follow-up CT scans 9/27/18 showed similar findings. There was also a stable lytic lesion in the right ischial tuberosity and right L1 pedicle and a mixed lytic/sclerotic focus in the right fourth rib. He had no associated symptoms. CT PET scan 2/5/19 showed unchanged small punctate pulmonary nodules with no significant hypermetabolic activity. 14 mm right hepatic lobe hypodensity showed no significant glucose uptake. The lucent lesions described in the right ilium, L1 and right fourth rib showed no glucose uptake. He has had no evidence of disease recurrence on follow-up and additional surveillance imaging.    He presented to his PCP 7/11/24 complaining of 15 lb weight loss over several weeks, weakness, fatigue and a nonproductive cough.  He denied any fever, chills or night sweats.   CXR 7/11/24:  Small right pleural effusion and widened mediastinum.  CT Chest 7/18/24:  Bulky confluent mediastinal adenopathy and left axillary lymph node measuring 1.7 x 1.1 cm with a partially imaged soniya mass in the abdomen encasing the superior mesenteric artery.  CT-PET 7/23/24:  Widespread hypermetabolic adenopathy involving the lower neck, mediastinum and abdomen.  Large conglomerate soniya mass in the midabdomen surrounding the aorta measuring up to 14 x 12 cm with a maximum SUV of 13.  No obvious sites of extranodal disease.  Left axillary LN biopsy 7/25/24:  Follicular lymphoma, grade 3b    Due to his advanced age, treatment was recommended with single agent Rituximab 375  mg/m2 weekly for 4 weeks. Hepatitis-B serology was negative.  Baseline LDH and uric acid levels were normal.  He started treatment on 8/5/24.  He received allopurinol for tumor lysis prophylaxis.  He completed 4 weeks of therapy 8/26/24.    CT C/A/P 10/04/24:  Decreased size of all areas of adenopathy.  Right paratracheal 3.4 x 2.9 cm (previous 4.3 x 3.6 cm). No significant axillary or supraclavicular adenopathy.  Decreased right pleural effusion.  Confluent retroperitoneal soniya mass approximately 8 cm AP (previous 10.6 cm).  CT C/A/P 2/07/25:  Disease progression.  Prevascular LN 3.4 x 2.9 cm (previous 2.1 x 2.0 cm), subcarinal LN 2.9 x 2.4 cm and prevascular LN 3.5 x 1.8 cm (previous 2.3 x 1.8 cm).  Left axillary LN 1.7 x 1.2 cm.  Confluent soniya mass involving the retroperitoneum and small bowel mesentery 9.5 cm AP (previous 8 cm).      Additional treatment was discussed with a regimen of Obinutuzumab and Zanubrutinib.  Benefits, risks and toxicities were reviewed and discussed in detail.  He initially agreed to treatment, but subsequently called the office and had changed his mind.  He made an informed decision not to pursue treatment after further discussion with his son due to concerns for potential side-effects.      Interval History  He returns to the office today for a follow-up visit accompanied by his daughter.  He complains of increased fatigue and a general lack of energy.  He is not as active as he has been in the past.  He has lost approximately 12 lb over the past 2 months.  He specifically denies any fever, chills or night sweats.  No recent illnesses or infections.  He called the office last week due to his symptoms and was restarted on prednisone 20 mg daily.  He reports mild improvement on prednisone.  He still has no interest in pursuing treatment as previously discussed with Obinutuzumab and Zanubrutinib.      Review of Systems   Constitutional:  Positive for fatigue and unexpected weight  "change. Negative for appetite change, fever and night sweats.   HENT:  Positive for hearing loss. Negative for mouth sores, sore throat and trouble swallowing.    Eyes: Negative.    Respiratory:  Negative for cough, shortness of breath and wheezing.    Cardiovascular:  Negative for chest pain, palpitations and leg swelling.   Gastrointestinal:  Negative for abdominal distention, abdominal pain, constipation, diarrhea and nausea.   Genitourinary:  Positive for frequency. Negative for dysuria, flank pain and hematuria.   Musculoskeletal:  Negative for arthralgias and back pain.   Integumentary:  Negative for pallor and rash.   Neurological:  Positive for vertigo (Positional) and weakness (Generalized, nonfocal). Negative for dizziness, numbness and headaches.   Hematological:  Negative for adenopathy. Does not bruise/bleed easily.   Psychiatric/Behavioral:  Positive for sleep disturbance (Chronic). Negative for confusion and dysphoric mood. The patient is not nervous/anxious.         Bipolar disorder - stable       PMHx:  CAD, HTN, hypercholesterolemia, arthritis, diabetes mellitus with neuropathy, bipolar disorder, BPH, cataracts, melanoma right back 2005, anemia  PSHx:  Right hemicolectomy 9/17, colonoscopy, tonsillectomy, melanoma resection right lower back, Mediport, excision left arm lesion  SH:  No cigarettes, + smokeless tobacco, quit 2014.  FH:  His father had multiple myeloma.     Objective:        /64   Pulse 78   Temp 97.9 °F (36.6 °C)   Resp 15   Ht 5' 9" (1.753 m)   Wt 66.7 kg (147 lb)   SpO2 95%   BMI 21.71 kg/m²    Physical Exam  Constitutional:       Comments: Elderly white male in NAD, ambulatory with a cane   HENT:      Head: Normocephalic.      Mouth/Throat:      Mouth: Mucous membranes are moist.      Pharynx: Oropharynx is clear. No posterior oropharyngeal erythema.   Eyes:      General: No scleral icterus.     Extraocular Movements: Extraocular movements intact.      Pupils: Pupils " are equal, round, and reactive to light.   Cardiovascular:      Rate and Rhythm: Normal rate and regular rhythm.      Heart sounds: No murmur heard.  Pulmonary:      Comments: Lungs clear to auscultation  Abdominal:      General: Bowel sounds are normal.      Palpations: Abdomen is soft.      Comments: Well-healed laparoscopic incision sites.  Slightly distended, but nontender.  No palpable fullness, mass or splenomegaly.   Musculoskeletal:         General: No swelling or tenderness.      Cervical back: Neck supple. No tenderness.   Lymphadenopathy:      Comments: Well-healed left axillary incision.  No palpable peripheral lymphadenopathy.   Skin:     General: Skin is warm and dry.      Findings: No rash.      Comments: MediPort removal incision right chest wall.   Neurological:      General: No focal deficit present.      Mental Status: He is alert and oriented to person, place, and time.      Cranial Nerves: No cranial nerve deficit.      Motor: No weakness.     ECOG SCORE    2 - Capable of all selfcare but unable to carry out any work activities, active > 50% of hours          LABORATORY  Recent Results (from the past week)   CBC with Differential    Collection Time: 05/19/25  2:34 PM   Result Value Ref Range    WBC 8.64 4.50 - 11.50 x10(3)/mcL    RBC 4.73 4.70 - 6.10 x10(6)/mcL    Hgb 13.3 (L) 14.0 - 18.0 g/dL    Hct 41.2 (L) 42.0 - 52.0 %    MCV 87.1 80.0 - 94.0 fL    MCH 28.1 27.0 - 31.0 pg    MCHC 32.3 (L) 33.0 - 36.0 g/dL    RDW 14.2 11.5 - 17.0 %    Platelet 243 130 - 400 x10(3)/mcL    MPV 9.3 7.4 - 10.4 fL    Neut % 75.3 %    Lymph % 11.5 %    Mono % 10.1 %    Eos % 2.1 %    Basophil % 0.5 %    Imm Grans % 0.5 %    Neut # 6.52 2.1 - 9.2 x10(3)/mcL    Lymph # 0.99 0.6 - 4.6 x10(3)/mcL    Mono # 0.87 0.1 - 1.3 x10(3)/mcL    Eos # 0.18 0 - 0.9 x10(3)/mcL    Baso # 0.04 <=0.2 x10(3)/mcL    Imm Gran # 0.04 0.00 - 0.04 x10(3)/mcL       Assessment:   Stage IIIA follicular NHL - grade 3b  Stage III colon cancer  9/17 - REGINALD      Plan:   CT scans from 2/7/25 showed minor disease progression.   Additional treatment was discussed at that time and again today with Obinutuzumab and Zanubrutinib.    He reaffirmed his decision that he does not want any additional treatment.  He opts for palliative/supportive care.  His family was hopeful that laboratory testing might reveal another reason for his generalized weakness.  Continue prednisone 20 mg daily.  RTC in 2 months for a follow-up visit and clinical assessment with repeat laboratory.      SAMY NDIAYE MD    Other Physicians  Dr. Luis Jorgensen ([facility])

## 2025-05-19 ENCOUNTER — LAB VISIT (OUTPATIENT)
Dept: LAB | Facility: HOSPITAL | Age: OVER 89
End: 2025-05-19
Attending: INTERNAL MEDICINE
Payer: MEDICARE

## 2025-05-19 ENCOUNTER — OFFICE VISIT (OUTPATIENT)
Dept: HEMATOLOGY/ONCOLOGY | Facility: CLINIC | Age: OVER 89
End: 2025-05-19
Payer: MEDICARE

## 2025-05-19 VITALS
HEART RATE: 78 BPM | HEIGHT: 69 IN | RESPIRATION RATE: 15 BRPM | TEMPERATURE: 98 F | OXYGEN SATURATION: 95 % | SYSTOLIC BLOOD PRESSURE: 125 MMHG | WEIGHT: 147 LBS | DIASTOLIC BLOOD PRESSURE: 64 MMHG | BODY MASS INDEX: 21.77 KG/M2

## 2025-05-19 DIAGNOSIS — C82.48 GRADE 3B FOLLICULAR LYMPHOMA OF LYMPH NODES OF MULTIPLE REGIONS: Primary | ICD-10-CM

## 2025-05-19 DIAGNOSIS — Z85.038 PERSONAL HISTORY OF COLON CANCER: ICD-10-CM

## 2025-05-19 DIAGNOSIS — C82.48 GRADE 3B FOLLICULAR LYMPHOMA OF LYMPH NODES OF MULTIPLE REGIONS: ICD-10-CM

## 2025-05-19 LAB
ALBUMIN SERPL-MCNC: 2.8 G/DL (ref 3.4–4.8)
ALBUMIN/GLOB SERPL: 1 RATIO (ref 1.1–2)
ALP SERPL-CCNC: 82 UNIT/L (ref 40–150)
ALT SERPL-CCNC: 29 UNIT/L (ref 0–55)
ANION GAP SERPL CALC-SCNC: 8 MEQ/L
AST SERPL-CCNC: 20 UNIT/L (ref 11–45)
BASOPHILS # BLD AUTO: 0.04 X10(3)/MCL
BASOPHILS NFR BLD AUTO: 0.5 %
BILIRUB SERPL-MCNC: 0.2 MG/DL
BUN SERPL-MCNC: 27.7 MG/DL (ref 8.4–25.7)
CALCIUM SERPL-MCNC: 8.2 MG/DL (ref 8.8–10)
CHLORIDE SERPL-SCNC: 111 MMOL/L (ref 98–111)
CO2 SERPL-SCNC: 23 MMOL/L (ref 23–31)
CREAT SERPL-MCNC: 0.9 MG/DL (ref 0.72–1.25)
CREAT/UREA NIT SERPL: 31
EOSINOPHIL # BLD AUTO: 0.18 X10(3)/MCL (ref 0–0.9)
EOSINOPHIL NFR BLD AUTO: 2.1 %
ERYTHROCYTE [DISTWIDTH] IN BLOOD BY AUTOMATED COUNT: 14.2 % (ref 11.5–17)
GFR SERPLBLD CREATININE-BSD FMLA CKD-EPI: >60 ML/MIN/1.73/M2
GLOBULIN SER-MCNC: 2.7 GM/DL (ref 2.4–3.5)
GLUCOSE SERPL-MCNC: 138 MG/DL (ref 75–121)
HCT VFR BLD AUTO: 41.2 % (ref 42–52)
HGB BLD-MCNC: 13.3 G/DL (ref 14–18)
IMM GRANULOCYTES # BLD AUTO: 0.04 X10(3)/MCL (ref 0–0.04)
IMM GRANULOCYTES NFR BLD AUTO: 0.5 %
LYMPHOCYTES # BLD AUTO: 0.99 X10(3)/MCL (ref 0.6–4.6)
LYMPHOCYTES NFR BLD AUTO: 11.5 %
MCH RBC QN AUTO: 28.1 PG (ref 27–31)
MCHC RBC AUTO-ENTMCNC: 32.3 G/DL (ref 33–36)
MCV RBC AUTO: 87.1 FL (ref 80–94)
MONOCYTES # BLD AUTO: 0.87 X10(3)/MCL (ref 0.1–1.3)
MONOCYTES NFR BLD AUTO: 10.1 %
NEUTROPHILS # BLD AUTO: 6.52 X10(3)/MCL (ref 2.1–9.2)
NEUTROPHILS NFR BLD AUTO: 75.3 %
PLATELET # BLD AUTO: 243 X10(3)/MCL (ref 130–400)
PMV BLD AUTO: 9.3 FL (ref 7.4–10.4)
POTASSIUM SERPL-SCNC: 4.8 MMOL/L (ref 3.5–5.1)
PROT SERPL-MCNC: 5.5 GM/DL (ref 5.8–7.6)
RBC # BLD AUTO: 4.73 X10(6)/MCL (ref 4.7–6.1)
SODIUM SERPL-SCNC: 142 MMOL/L (ref 136–145)
WBC # BLD AUTO: 8.64 X10(3)/MCL (ref 4.5–11.5)

## 2025-05-19 PROCEDURE — 36415 COLL VENOUS BLD VENIPUNCTURE: CPT

## 2025-05-19 PROCEDURE — 99999 PR PBB SHADOW E&M-EST. PATIENT-LVL IV: CPT | Mod: PBBFAC,,, | Performed by: INTERNAL MEDICINE

## 2025-05-19 PROCEDURE — 85025 COMPLETE CBC W/AUTO DIFF WBC: CPT

## 2025-05-19 PROCEDURE — 99214 OFFICE O/P EST MOD 30 MIN: CPT | Mod: PBBFAC | Performed by: INTERNAL MEDICINE

## 2025-05-19 PROCEDURE — 80053 COMPREHEN METABOLIC PANEL: CPT

## 2025-05-19 RX ORDER — PREDNISONE 20 MG/1
20 TABLET ORAL DAILY
COMMUNITY

## 2025-05-19 RX ORDER — PREDNISONE 20 MG/1
20 TABLET ORAL DAILY
Qty: 30 TABLET | Refills: 1 | Status: SHIPPED | OUTPATIENT
Start: 2025-05-19

## 2025-07-09 ENCOUNTER — TELEPHONE (OUTPATIENT)
Dept: HEMATOLOGY/ONCOLOGY | Facility: CLINIC | Age: OVER 89
End: 2025-07-09
Payer: MEDICARE

## 2025-07-09 NOTE — TELEPHONE ENCOUNTER
Patient called to report weakness and SOB with any exertion. He said he especially has problems putting on his socks and shoes so he has just been going barefoot. He says his son thinks he is having heart problems and has been asking to take I'm in for eval but patient does not want any testing or intervention, he just wants a liquid prescription that will make him feel better. He is still taking the prednisone 20 mg Dr. Gerber prescribed and asked if he should increase the dose but I advised him 20 mg is the dose Dr. Gerber wants him on. He says he is scheduled to see Dr. Johnson, his PCP early next week and is scheduled to see us a few days later but does not think he will be able to make it to either appt. I asked if he has considered hospice care since they would be able to see him at his home and give him assistance with his complaints. He said he will consider that and discuss with his son who has been checking on him several times a day. We agreed to leave his appt here as is and he will let me know before if he cannot make it or if he decides on a hospice referral.

## 2025-07-11 ENCOUNTER — TELEPHONE (OUTPATIENT)
Dept: PRIMARY CARE CLINIC | Facility: CLINIC | Age: OVER 89
End: 2025-07-11
Payer: MEDICARE

## 2025-07-11 NOTE — TELEPHONE ENCOUNTER
Patient spoke with his oncology office. See note below.          Tone Gerber MD  Hematology and Oncology                   7/9/25 11:16 AM  Blaine Elmore Sr. contacted Kay Davies LPN Leger, Lori, LPN      7/9/25 11:25 AM  Note  Patient called to report weakness and SOB with any exertion. He said he especially has problems putting on his socks and shoes so he has just been going barefoot. He says his son thinks he is having heart problems and has been asking to take I'm in for eval but patient does not want any testing or intervention, he just wants a liquid prescription that will make him feel better. He is still taking the prednisone 20 mg Dr. Gerber prescribed and asked if he should increase the dose but I advised him 20 mg is the dose Dr. Gerber wants him on. He says he is scheduled to see Dr. Johnson, his PCP early next week and is scheduled to see us a few days later but does not think he will be able to make it to either appt. I asked if he has considered hospice care since they would be able to see him at his home and give him assistance with his complaints. He said he will consider that and discuss with his son who has been checking on him several times a day. We agreed to leave his appt here as is and he will let me know before if he cannot make it or if he decides on a hospice referral.

## 2025-07-11 NOTE — TELEPHONE ENCOUNTER
Copied from CRM #[id number]. Topic: General Inquiry - Patient Advice  >> Jul 9, 2025 12:47 PM Maru wrote:  Pt requesting a call back pt states he is very weak I called several times the phone is busy call back [phone]

## 2025-07-15 NOTE — PROGRESS NOTES
Patient ID: [id number]     Chief Complaint: Medicare AWV Follow Up      HPI:     Blaine Elmore Sr. is a 89 y.o. male here today for a Medicare Wellness.    A separate E/M code has been provided to evaluate a medical problem apart and separate from the preventative care visit that was addressed during the dedicated Medicare Wellness Exam.  Patient has incurable lymphoma, and has opted to stop his oncologic treatment.  At this point, he is having some issues with decreased appetite, weight loss, significant dyspnea, and significant weakness in part because of the dyspnea.  He states he has trouble on occasion even ambulating from one room to the other in his home.  He does attempt to eat adequately, uses supplements, but is still losing weight.  No pain      -------------------------------------    Adenocarcinoma of cecum    Bipolar disorder, unspecified    Cataract of both eyes    Colon cancer    Depression    DJD (degenerative joint disease)    Essential (primary) hypertension    Malignant neoplasm of colon    Urinary frequency        Past Surgical History:   Procedure Laterality Date    CATARACT EXTRACTION Left     COLONOSCOPY  2017    EXCISION OF MELANOMA      right lower back    MEDIPORT INSERTION, SINGLE      RIGHT HEMICOLECTOMY  09/2017    TONSILLECTOMY         Review of patient's allergies indicates:   Allergen Reactions    Solifenacin      Other reaction(s): arrhythmia    Statins-hmg-coa reductase inhibitors Other (See Comments)     myalgias       Outpatient Medications Marked as Taking for the 7/16/25 encounter (Office Visit) with Luis Johnson MD   Medication Sig Dispense Refill    amLODIPine (NORVASC) 10 MG tablet Take 1 tablet (10 mg total) by mouth once daily. 90 tablet 3    aspirin (ECOTRIN) 81 MG EC tablet Take 81 mg by mouth.      losartan (COZAAR) 100 MG tablet Take 1 tablet (100 mg total) by mouth once daily. 90 tablet 3    melatonin 10 mg Tab Take 10 mg by mouth as needed (insomnia).       "predniSONE (DELTASONE) 20 MG tablet Take 1 tablet (20 mg total) by mouth once daily. 30 tablet 1    zolpidem (AMBIEN) 5 MG Tab Take 1 tablet (5 mg total) by mouth nightly as needed (Insomnia). 30 tablet 5    [DISCONTINUED] traMADoL (ULTRAM) 50 mg tablet Take 1 tablet (50 mg total) by mouth every 6 (six) hours as needed for Pain. 60 tablet 0    [DISCONTINUED] vitamin B complex (SUPER B COMPLEX-B-12 ORAL) Take by mouth.         Social History[1]     Family History   Problem Relation Name Age of Onset    No Known Problems Mother      Multiple myeloma Father          Patient Care Team:  Luis Johnson MD as PCP - General (Family Medicine)  Tone Gerber MD as Consulting Physician (Oncology)  Roger Arguelles MD as Consulting Physician (Urology)     Opioid Screening: Patient medication list reviewed, patient is not taking prescription opioids. Patient is at low risk of substance abuse based on this opioid use history.       Subjective:     Review of Systems   Constitutional:  Positive for weight loss (Decreased appetite). Negative for malaise/fatigue.   HENT: Negative.     Eyes: Negative.    Respiratory:  Positive for shortness of breath (Significant dyspnea with exertion).    Cardiovascular: Negative.  Negative for chest pain.   Gastrointestinal: Negative.    Genitourinary: Negative.    Musculoskeletal: Negative.    Skin: Negative.    Neurological:  Positive for weakness (Generalized worsening weakness). Negative for focal weakness and headaches.   Endo/Heme/Allergies: Negative.    Psychiatric/Behavioral: Negative.  Negative for depression and memory loss. The patient is not nervous/anxious.          Patient Reported Health Risk Assessment       Objective:     /73   Pulse 86   Resp 18   Ht 5' 9" (1.753 m)   Wt 63 kg (139 lb)   SpO2 95%   BMI 20.53 kg/m²     Physical Exam  Constitutional:       Appearance: Normal appearance.   HENT:      Head: Normocephalic.      Mouth/Throat:      Mouth: Mucous " membranes are moist.      Pharynx: Oropharynx is clear.   Eyes:      Conjunctiva/sclera: Conjunctivae normal.      Pupils: Pupils are equal, round, and reactive to light.   Cardiovascular:      Rate and Rhythm: Normal rate and regular rhythm.      Heart sounds: Normal heart sounds.   Pulmonary:      Effort: Pulmonary effort is normal.      Breath sounds: Normal breath sounds.   Abdominal:      General: Abdomen is flat.      Palpations: Abdomen is soft.   Musculoskeletal:         General: Normal range of motion.      Cervical back: Neck supple.   Skin:     General: Skin is warm and dry.   Neurological:      General: No focal deficit present.      Mental Status: He is alert and oriented to person, place, and time.   Psychiatric:         Mood and Affect: Mood normal.         Behavior: Behavior normal.         Thought Content: Thought content normal.         Judgment: Judgment normal.         Lab Results   Component Value Date     (H) 05/19/2025     05/19/2025    K 4.8 05/19/2025     05/19/2025    CO2 23 05/19/2025    BUN 27.7 (H) 05/19/2025    CREATININE 0.90 05/19/2025    CALCIUM 8.2 (L) 05/19/2025    PROT 5.5 (L) 05/19/2025    ALBUMIN 2.8 (L) 05/19/2025    BILITOT 0.2 05/19/2025    ALKPHOS 82 05/19/2025    AST 20 05/19/2025    ALT 29 05/19/2025    EGFRNORACEVR >60 05/19/2025     Lab Results   Component Value Date    WBC 8.64 05/19/2025    RBC 4.73 05/19/2025    HGB 13.3 (L) 05/19/2025    HCT 41.2 (L) 05/19/2025    MCV 87.1 05/19/2025    RDW 14.2 05/19/2025     05/19/2025      Lab Results   Component Value Date    CHOL 216 (H) 07/10/2024    TRIG 102 07/10/2024    HDL 60 07/10/2024    .00 07/10/2024    TOTALCHOLEST 4 07/10/2024     Lab Results   Component Value Date    TSH 0.130 (L) 07/16/2024     Lab Results   Component Value Date    HGBA1C 5.6 07/10/2024        Lab Results   Component Value Date    PSA 7.93 (H) 06/20/2022              No data to display                  5/19/2025      3:00 PM 3/19/2025     1:20 PM 2/12/2025     1:40 PM 12/23/2024     9:00 AM 12/18/2024     9:20 AM 10/28/2024     9:00 AM 10/7/2024     9:20 AM   Fall Risk Assessment - Outpatient   Mobility Status Ambulatory w/ assistance Ambulatory w/ assistance Ambulatory Ambulatory Ambulatory w/ assistance Ambulatory Ambulatory   Number of falls 1 0 0 0 0 0 0   Identified as fall risk True True False False True False False              Assessment:       ICD-10-CM ICD-9-CM   1. Medicare annual wellness visit, subsequent  Z00.00 V70.0   2. Primary hypertension  I10 401.9   3. Prediabetes  R73.03 790.29   4. Dyslipidemia  E78.5 272.4   5. Bipolar affective disorder, currently depressed, moderate  F31.32 296.52   6. Primary insomnia  F51.01 307.42   7. 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 Dyspnea on exertion  R06.09 786.09   9. Decreased appetite  R63.0 783.0   10. Weakness  R53.1 780.79        Plan:     Medicare Annual Wellness and Personalized Prevention Plan:   Fall Risk + Home Safety + Hearing Impairment + Depression Screen + Cognitive Impairment Screen + Health Risk Assessment all reviewed.       Health Maintenance Topics with due status: Not Due       Topic Last Completion Date    Influenza Vaccine 10/02/2023    Lipid Panel 07/10/2024      The patient's Health Maintenance was reviewed and the following appears to be due at this time:   Health Maintenance Due   Topic Date Due    TETANUS VACCINE  Never done    Shingles Vaccine (1 of 2) 01/12/2015    COVID-19 Vaccine (4 - 2024-25 season) 09/01/2024    Hemoglobin A1c (Prediabetes)  07/10/2025     Health risk assessment:  After review of the patient's medical record as it relates to health risk assessment over the next 5-10 years per recommendations of the USPSTF, it was determined that all pertinent recommendations have been appropriately addressed. The patient does not desire any further preventative care measures or screening tests  at this time.  We will continue to reassess at each annual visit.    1. Medicare annual wellness visit, subsequent  Comments:  Overall health status was reviewed.  Good health habits reinforced.  Annual wellness exams recommended.    2. Primary hypertension  Overview:  Prescribed losartan 100 mg daily, amlodipine 10 mg daily.    Blood pressures appear to be adequately controlled with current treatment plan, including prescription blood pressure medication.  Medication(s) appear to be effective at current dosages, and are not causing any side effects or problems.  Continue medical management and continue home blood pressure checks.  Average blood pressures at home should be no greater than 130/80.  Patient instructed to contact the clinic if blood pressures become elevated at any point prior to next clinic visit.    Medication will be continued at the current dosage.      3. Prediabetes  Overview:  Patient has prediabetes and is not prescribed medication.  Patient's prediabetes is treated and controlled adequately using conservative means, specifically lifestyle modification, dietary changes, and/or increase in activity/exercise.      4. Dyslipidemia  Overview:  Dyslipidemia is being treated using lifestyle modification only.  Patient is not being prescribed lipid-lowering medication.  As discussed, we will continue to monitor this, and may ultimately choose to prescribe medication if this becomes difficult to control utilizing lifestyle modification only.      5. Bipolar affective disorder, currently depressed, moderate  Overview:  Prescribed sertraline 25 mg daily.    Patient reports stability of moods, and effectiveness of medications, including no agitation, significant somnolence, or significant bouts of anxiety or depression.  Patient is not having any sleep disturbance.  Current treatment plan will continue, with plans to discuss any significant changes in patient's status if necessary if anything changes in  the future.      6. Primary insomnia  Overview:  Prescribed Ambien 5 mg q.h.s. p.r.n. insomnia, he may take this as much as necessary, every night if necessary.    Ambien started on 02/07/2024.      7. 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      8. Dyspnea on exertion  Overview:  Significant dyspnea on exertion, and weakness, I do think he would benefit from supplemental oxygen therapy, we will look into this.      9. Decreased appetite  Overview:  Decreased appetite associated with his cancer, we discussed medical marijuana as an option.  I suggested that he see Dr. Sindy Parsons.      10. Weakness  Overview:  Significant weakness and dyspnea secondary to his lymphoma and his worsening condition.  Patient currently not actively undergoing oncologic treatment, no doubt his cancer is progressing causing his symptoms.  We will order home health, and ultimately hospice.              Advance Care Planning     Date: 07/15/2025  During this visit, I engaged the patient  in the voluntary advance care planning process.  The patient and I reviewed the role for advance directives and their purpose in directing future healthcare if the patient's unable to speak for him/herself.  At this point in time, the patient does have full decision-making capacity.  We discussed different extreme health states that she could experience, and reviewed what kind of medical care she would want in those situations.  The patient communicated that if she were comatose and had little chance of a meaningful recovery, she would not want machines/life-sustaining treatments used. In addition to the above preference, other important end-of-life issues for the patient include no other issues.  Advanced care planning paperwork offered to the patient to bring home and  discuss with the family.  If signed, patient should bring form back for for the purposes of entering it into the medical record.  I spent a total of 5 minutes engaging the patient in this advance care planning discussion.        Current Outpatient Medications   Medication Instructions    amLODIPine (NORVASC) 10 mg, Oral, Daily    aspirin (ECOTRIN) 81 mg    losartan (COZAAR) 100 mg, Oral, Daily    melatonin 10 mg, As needed (PRN)    predniSONE (DELTASONE) 20 mg, Oral, Daily    vit A/vit C/vit E/zinc/copper (ICAPS AREDS ORAL) Take by mouth.    zolpidem (AMBIEN) 5 mg, Oral, Nightly PRN        Follow up in about 3 months (around 10/28/2025) for Chronic condition F/up. In addition to their scheduled follow up, the patient has also been instructed to follow up on as needed basis.     This note was created with the assistance of [employer] voice recognition software or phone dictation. There may be transcription errors as a result of using this technology. Effort has been made to assure accuracy of transcription but any obvious errors or omissions should be clarified with the author of the document.         [1]   Social History  Socioeconomic History    Marital status:    Tobacco Use    Smoking status: Never    Smokeless tobacco: Former     Types: Snuff   Substance and Sexual Activity    Alcohol use: Yes     Alcohol/week: 1.0 - 2.0 standard drink of alcohol     Types: 1 - 2 Cans of beer per week    Drug use: Never

## 2025-07-16 ENCOUNTER — TELEPHONE (OUTPATIENT)
Dept: HEMATOLOGY/ONCOLOGY | Facility: CLINIC | Age: OVER 89
End: 2025-07-16
Payer: MEDICARE

## 2025-07-16 ENCOUNTER — OFFICE VISIT (OUTPATIENT)
Dept: PRIMARY CARE CLINIC | Facility: CLINIC | Age: OVER 89
End: 2025-07-16
Payer: MEDICARE

## 2025-07-16 VITALS
HEIGHT: 69 IN | DIASTOLIC BLOOD PRESSURE: 73 MMHG | OXYGEN SATURATION: 95 % | WEIGHT: 139 LBS | HEART RATE: 86 BPM | BODY MASS INDEX: 20.59 KG/M2 | RESPIRATION RATE: 18 BRPM | SYSTOLIC BLOOD PRESSURE: 135 MMHG

## 2025-07-16 DIAGNOSIS — R73.03 PREDIABETES: Chronic | ICD-10-CM

## 2025-07-16 DIAGNOSIS — F51.01 PRIMARY INSOMNIA: Chronic | ICD-10-CM

## 2025-07-16 DIAGNOSIS — Z00.00 MEDICARE ANNUAL WELLNESS VISIT, SUBSEQUENT: Primary | ICD-10-CM

## 2025-07-16 DIAGNOSIS — F31.32 BIPOLAR AFFECTIVE DISORDER, CURRENTLY DEPRESSED, MODERATE: Chronic | ICD-10-CM

## 2025-07-16 DIAGNOSIS — R53.1 WEAKNESS: ICD-10-CM

## 2025-07-16 DIAGNOSIS — R63.0 DECREASED APPETITE: ICD-10-CM

## 2025-07-16 DIAGNOSIS — E78.5 DYSLIPIDEMIA: Chronic | ICD-10-CM

## 2025-07-16 DIAGNOSIS — C82.48 GRADE 3B FOLLICULAR LYMPHOMA OF LYMPH NODES OF MULTIPLE REGIONS: Chronic | ICD-10-CM

## 2025-07-16 DIAGNOSIS — R06.09 DYSPNEA ON EXERTION: Chronic | ICD-10-CM

## 2025-07-16 DIAGNOSIS — I10 PRIMARY HYPERTENSION: Chronic | ICD-10-CM

## 2025-07-16 PROCEDURE — G0439 PPPS, SUBSEQ VISIT: HCPCS | Mod: ,,, | Performed by: GENERAL PRACTICE

## 2025-07-16 PROCEDURE — 99213 OFFICE O/P EST LOW 20 MIN: CPT | Mod: 25,,, | Performed by: GENERAL PRACTICE

## 2025-07-16 RX ORDER — ACETAMINOPHEN, DIPHENHYDRAMINE HCL, PHENYLEPHRINE HCL 325; 25; 5 MG/1; MG/1; MG/1
10 TABLET ORAL
COMMUNITY

## 2025-07-16 NOTE — TELEPHONE ENCOUNTER
Message from Khadra: GLEN: patient canceled 7/21 appt. He is too weak to come and he does not wish to reschedule until he feels stronger.

## 2025-07-17 PROCEDURE — G0180 MD CERTIFICATION HHA PATIENT: HCPCS | Mod: ,,, | Performed by: GENERAL PRACTICE

## 2025-07-29 ENCOUNTER — TELEPHONE (OUTPATIENT)
Dept: PRIMARY CARE CLINIC | Facility: CLINIC | Age: OVER 89
End: 2025-07-29
Payer: MEDICARE

## 2025-07-29 ENCOUNTER — TELEPHONE (OUTPATIENT)
Dept: HEMATOLOGY/ONCOLOGY | Facility: CLINIC | Age: OVER 89
End: 2025-07-29
Payer: MEDICARE

## 2025-07-29 DIAGNOSIS — F51.01 PRIMARY INSOMNIA: Chronic | ICD-10-CM

## 2025-07-29 DIAGNOSIS — F51.01 PRIMARY INSOMNIA: Primary | Chronic | ICD-10-CM

## 2025-07-29 RX ORDER — ALPRAZOLAM 0.5 MG/1
0.5 TABLET ORAL NIGHTLY PRN
Qty: 30 TABLET | Refills: 5 | Status: SHIPPED | OUTPATIENT
Start: 2025-07-29 | End: 2026-01-25

## 2025-07-29 RX ORDER — ZOLPIDEM TARTRATE 10 MG/1
10 TABLET ORAL NIGHTLY PRN
Qty: 30 TABLET | Refills: 5 | Status: SHIPPED | OUTPATIENT
Start: 2025-07-29 | End: 2025-07-29

## 2025-07-29 RX ORDER — ALPRAZOLAM 0.5 MG/1
0.5 TABLET ORAL NIGHTLY PRN
Qty: 30 TABLET | Refills: 5 | Status: SHIPPED | OUTPATIENT
Start: 2025-07-29 | End: 2025-07-29 | Stop reason: SDUPTHER

## 2025-07-29 NOTE — TELEPHONE ENCOUNTER
Spoke with patient son and he reports he has a friend that was c/o weakness who was given B12 injections and it benefited him so he mentioned it to his father and that is why he called and asked for it today. He reports his dad saw Dr. Johnson 2 weeks ago and his VS were normal with a O2 sat of 95% but patient has dyspnea whenever he ambulates even short distances. He also told me Dr. Johnson has ordered home health and they have been going out to see him several times a week. We discussed the services that hospice offers compared to regular home health and he asked me to mention it to his dad again. I called and spoke with patient and his main c/o is the profound weakness and dyspnea. He reports he is happy with the care he is getting with home health and that a PT went out and showed him some exercises but he is unable to do much due to his symptoms so he asked them not to return. I explained to him per Dr. Gerber that his symptoms are due to his progressive disease process and we expect that he will continue to decline. I advised him that Dr. Gerber cannot order B12 injections because he does not have a diagnosis of B12 deficiency. I reviewed all the services that hospice can provide as compared to home health and explained that hospice care would be able to provide the most comfortable care possible in his home as opposed to home health still having to jump through the hoops Medicare requires. I told him that Dr. Gerber wants him to have the best care possible and that the home health he is using now also has hospice services and he can get advancement in his care simply by asking them to call us or Dr. Johnson. Patient thanked me for our care and agreed to call with any questions or problems.

## 2025-07-29 NOTE — TELEPHONE ENCOUNTER
Copied from CRM #[id number]. Topic: General Inquiry - Patient Advice  >> Jul 29, 2025 10:18 AM Luis Johnson MD wrote:  Spoke to patient, I will prescribe him Xanax instead of the Ambien, prescription sent to pharmacy see use at night as needed for insomnia.  ----- Message -----  From: Nahomi Duff LPN  Sent: 7/28/2025  11:26 AM CDT  To: Luis Johnson MD    Please Advise  ----- Message -----  From: Maru Pan  Sent: 7/28/2025   8:19 AM CDT  To: Alex Kraft

## 2025-07-29 NOTE — TELEPHONE ENCOUNTER
[facility] called stating they received the signed paperwork about pt getting B-12 shot for weakness. They stated they are need you to put in a order for B12 shots and send to the pharmacy and pt must purchase and it can be given by [facility] nurses.

## 2025-07-30 ENCOUNTER — TELEPHONE (OUTPATIENT)
Dept: HEMATOLOGY/ONCOLOGY | Facility: CLINIC | Age: OVER 89
End: 2025-07-30
Payer: MEDICARE

## 2025-08-04 ENCOUNTER — TELEPHONE (OUTPATIENT)
Dept: PRIMARY CARE CLINIC | Facility: CLINIC | Age: OVER 89
End: 2025-08-04
Payer: MEDICARE

## 2025-08-04 NOTE — TELEPHONE ENCOUNTER
Copied from CRM #[id number]. Topic: General Inquiry - Patient Advice  >> Aug 1, 2025  9:41 AM Maru wrote:  Pt requesting a call back about med pt started taking ALPRAZolam (XANAX) 0.5 MG tablet  and would like to stop taking  it and go back to zolpidem 5mg

## 2025-08-21 ENCOUNTER — EXTERNAL HOME HEALTH (OUTPATIENT)
Dept: HOME HEALTH SERVICES | Facility: HOSPITAL | Age: OVER 89
End: 2025-08-21
Payer: MEDICARE